# Patient Record
Sex: FEMALE | Race: WHITE | NOT HISPANIC OR LATINO | Employment: OTHER | URBAN - METROPOLITAN AREA
[De-identification: names, ages, dates, MRNs, and addresses within clinical notes are randomized per-mention and may not be internally consistent; named-entity substitution may affect disease eponyms.]

---

## 2017-03-28 DIAGNOSIS — I10 ESSENTIAL HYPERTENSION: Chronic | ICD-10-CM

## 2017-03-28 RX ORDER — AMLODIPINE BESYLATE 5 MG/1
TABLET ORAL
Qty: 30 TABLET | Refills: 0 | Status: SHIPPED | OUTPATIENT
Start: 2017-03-28 | End: 2017-04-28

## 2017-03-28 NOTE — TELEPHONE ENCOUNTER
amLODIPine (NORVASC) 5 MG tablet        Last Written Prescription Date: 1/2/2017  Last Fill Quantity: 90, # refills: 0  Last Office Visit with G, [facility] or [facility] prescribing provider: 12/28/2015  Next 5 appointments (look out 90 days)     Apr 28, 2017  9:30 AM CDT   Office Visit with Melissa Sevilla DO   [facility]    [address] [facility] [address]   [phone]                   Potassium   Date Value Ref Range Status   12/28/2015 3.7 3.4 - 5.3 mmol/L Final     Creatinine   Date Value Ref Range Status   12/28/2015 0.67 0.52 - 1.04 mg/dL Final     BP Readings from Last 3 Encounters:   12/28/15 139/82   08/24/15 142/88   02/18/15 142/81

## 2017-04-28 ENCOUNTER — OFFICE VISIT (OUTPATIENT)
Dept: FAMILY MEDICINE | Facility: CLINIC | Age: 77
End: 2017-04-28
Payer: COMMERCIAL

## 2017-04-28 VITALS
BODY MASS INDEX: 21.99 KG/M2 | TEMPERATURE: 98.3 F | OXYGEN SATURATION: 99 % | HEART RATE: 62 BPM | SYSTOLIC BLOOD PRESSURE: 126 MMHG | HEIGHT: 60 IN | DIASTOLIC BLOOD PRESSURE: 64 MMHG | WEIGHT: 112 LBS

## 2017-04-28 DIAGNOSIS — H61.23 BILATERAL IMPACTED CERUMEN: ICD-10-CM

## 2017-04-28 DIAGNOSIS — Z72.0 TOBACCO ABUSE: Chronic | ICD-10-CM

## 2017-04-28 DIAGNOSIS — Z13.220 LIPID SCREENING: ICD-10-CM

## 2017-04-28 DIAGNOSIS — I10 ESSENTIAL HYPERTENSION: Primary | Chronic | ICD-10-CM

## 2017-04-28 DIAGNOSIS — G56.03 BILATERAL CARPAL TUNNEL SYNDROME: ICD-10-CM

## 2017-04-28 LAB
ERYTHROCYTE [DISTWIDTH] IN BLOOD BY AUTOMATED COUNT: 13.6 % (ref 10–15)
HCT VFR BLD AUTO: 44.9 % (ref 35–47)
HGB BLD-MCNC: 15.2 G/DL (ref 11.7–15.7)
MCH RBC QN AUTO: 32.7 PG (ref 26.5–33)
MCHC RBC AUTO-ENTMCNC: 33.9 G/DL (ref 31.5–36.5)
MCV RBC AUTO: 97 FL (ref 78–100)
PLATELET # BLD AUTO: 231 10E9/L (ref 150–450)
RBC # BLD AUTO: 4.65 10E12/L (ref 3.8–5.2)
WBC # BLD AUTO: 4.8 10E9/L (ref 4–11)

## 2017-04-28 PROCEDURE — 36415 COLL VENOUS BLD VENIPUNCTURE: CPT | Performed by: INTERNAL MEDICINE

## 2017-04-28 PROCEDURE — 85027 COMPLETE CBC AUTOMATED: CPT | Performed by: INTERNAL MEDICINE

## 2017-04-28 PROCEDURE — 80061 LIPID PANEL: CPT | Performed by: INTERNAL MEDICINE

## 2017-04-28 PROCEDURE — 99214 OFFICE O/P EST MOD 30 MIN: CPT | Mod: 25 | Performed by: INTERNAL MEDICINE

## 2017-04-28 PROCEDURE — 69210 REMOVE IMPACTED EAR WAX UNI: CPT | Performed by: INTERNAL MEDICINE

## 2017-04-28 PROCEDURE — 80053 COMPREHEN METABOLIC PANEL: CPT | Performed by: INTERNAL MEDICINE

## 2017-04-28 RX ORDER — AMLODIPINE BESYLATE 5 MG/1
5 TABLET ORAL DAILY
Qty: 90 TABLET | Refills: 3 | Status: SHIPPED | OUTPATIENT
Start: 2017-04-28 | End: 2018-04-23

## 2017-04-28 NOTE — PATIENT INSTRUCTIONS
Labs today   Prescriptions refilled   Ear wash   Get a tetanus shot at a pharmacy   Consider wrist braces if carpal tunnel worsens  Follow up annually or as needed

## 2017-04-28 NOTE — PROGRESS NOTES
SUBJECTIVE:                                                    Corazon Hester is a 76 year old female who presents to clinic today for the following health issues:      Hypertension Follow-up      Outpatient blood pressures are not being checked.    Low Salt Diet: no added salt       Amount of exercise or physical activity: 4-5 days/week for an average of 30-45 minutes    Problems taking medications regularly: No    Medication side effects: none    Diet: low salt    Corazon presents to the clinic today for f/u. She is fasting today.    Home Life- Corazon's  passed away January 2016, they were  53 years. She says she is adjusting well. She had some people come look at her house and this made her cry. She says her children are supportive but don't live nearby. She was in [place] for 4 months over the winter staying with one of her sons. She will likely go back to [place] next winter. She is quite independent.    Bilateral Hand Paresthesia- She notes bilateral paresthesia in her hands when she has been sitting for long periods of time, like watching TV or sleeping. It includes all 5 fingers. She will shake her hands out and the sensation will go away. Sounds like carpal tunnel. Does not involve arms. Not bothersome enough for her to want to do anything about it - just note it.    Of Note-  She notes d/o PVC's, she follows with cardio and not on any medication.  Declines preventative healthcare, including mammogram and DEXA.  She smokes 4 cigarettes a day.  Complains of nasal drainage, especially when flossing or eating.  Asked me to look in her ears.    Problem list and histories reviewed & adjusted, as indicated.      ROS:  Detailed as above    This document serves as a record of the services and decisions personally performed and made by Melissa Sevilla DO. It was created on his/her behalf by Ashley Phelps, a trained medical scribe. The creation of this document is based the provider's  statements to the medical scribe.  Jailene Ashley Beckeraury 9:14 AM, April 28, 2017   OBJECTIVE:                                                    /64 (BP Location: Right arm)  Pulse 62  Temp 98.3  F (36.8  C) (Oral)  Ht 5' (1.524 m)  Wt 112 lb (50.8 kg)  SpO2 99%  BMI 21.87 kg/m2  Body mass index is 21.87 kg/(m^2).  Alert, pleasant, cooperative, NAD and robust for age  HRRR without mrg  Mois oral mucosa, no sores or lesions   Impacted cerumen bilaterally- ear wash per MA  Nasal passages clear    No edema  Lungs clear  Normal affect  No suspicious moles on back   Negative Phalen's test      ASSESSMENT/PLAN:                                                    1. Essential hypertension  Better at recheck and goal for age on her chronic meds  - amLODIPine (NORVASC) 5 MG tablet; Take 1 tablet (5 mg) by mouth daily  Dispense: 90 tablet; Refill: 3  - Comprehensive metabolic panel  - CBC with platelets    2. Tobacco abuse  No interested in cessation at this time     3. Bilateral carpal tunnel syndrome  Advised pt about wrist braces at night to help with paresthesias if she wishes    4. Lipid screening  - Lipid panel reflex to direct LDL    5. Bilateral impacted cerumen  Ear exam showing wax occlusion in the bilateral ear. The patients ear(s) were irrigated using a water pic with moderate amount of wax extracted.  Patient tolerated procedure well.      Patient Instructions   Labs today   Prescriptions refilled   Ear wash   Get a tetanus shot at a pharmacy   Consider wrist braces if carpal tunnel worsens  Follow up annually or as needed    The information in this document, created by the medical scribe for me, accurately reflects the services I personally performed and the decisions made by me. I have reviewed and approved this document for accuracy prior to leaving the patient care area.  Melissa Sevilla DO  9:14 AM, 04/28/17    Melissa Sevilla DO  [facility]

## 2017-04-28 NOTE — NURSING NOTE
Corazon Hester is a 76 year old female who presents today for Ear Wash, with the complaint of wax.    Ear exam showing wax occlusion in the bilateral ear. The patients ear(s) were irrigated using a water pic with moderate amount of wax extracted.  Patient tolerated procedure well.    Ladonna Carrillo CMA

## 2017-04-28 NOTE — NURSING NOTE
Chief Complaint   Patient presents with     RECHECK       Initial /87 (BP Location: Right arm, Cuff Size: Adult Regular)  Pulse 62  Temp 98.3  F (36.8  C) (Oral)  Ht 5' (1.524 m)  Wt 112 lb (50.8 kg)  SpO2 99%  BMI 21.87 kg/m2 Estimated body mass index is 21.87 kg/(m^2) as calculated from the following:    Height as of this encounter: 5' (1.524 m).    Weight as of this encounter: 112 lb (50.8 kg).  Medication Reconciliation: complete.      Ladonna Carrillo ,CMA

## 2017-04-28 NOTE — MR AVS SNAPSHOT
After Visit Summary   4/28/2017    Corazon Hester    MRN: [id number]           Patient Information     Date Of Birth          1940        Visit Information        Provider Department      4/28/2017 9:30 AM Melissa Sevilla,  [facility]        Today's Diagnoses     Essential hypertension    -  1    Tobacco abuse        Bilateral carpal tunnel syndrome        Lipid screening        Bilateral impacted cerumen          Care Instructions    Labs today   Prescriptions refilled   Ear wash   Get a tetanus shot at a pharmacy   Consider wrist braces if carpal tunnel worsens  Follow up annually or as needed          Follow-ups after your visit        Who to contact     If you have questions or need follow up information about today's clinic visit or your schedule please contact [facility] directly at [phone].  Normal or non-critical lab and imaging results will be communicated to you by MyChart, letter or phone within 4 business days after the clinic has received the results. If you do not hear from us within 7 days, please contact the clinic through MyChart or phone. If you have a critical or abnormal lab result, we will notify you by phone as soon as possible.  Submit refill requests through Bl[employer] or call your pharmacy and they will forward the refill request to us. Please allow 3 business days for your refill to be completed.          Additional Information About Your Visit        Care EveryWhere ID     This is your Care EveryWhere ID. This could be used by other organizations to access your [facility] medical records  [id number]        Your Vitals Were     Pulse Temperature Height Pulse Oximetry BMI (Body Mass Index)       62 98.3  F (36.8  C) (Oral) 5' (1.524 m) 99% 21.87 kg/m2        Blood Pressure from Last 3 Encounters:   04/28/17 126/64   12/28/15 139/82   08/24/15 142/88    Weight from Last 3 Encounters:   04/28/17 112 lb (50.8 kg)   12/28/15 113 lb 4.8 oz  (51.4 kg)   08/24/15 112 lb (50.8 kg)              We Performed the Following     CBC with platelets     Comprehensive metabolic panel     Lipid panel reflex to direct LDL     REMOVE IMPACTED CERUMEN          Today's Medication Changes          These changes are accurate as of: 4/28/17 10:21 AM.  If you have any questions, ask your nurse or doctor.               These medicines have changed or have updated prescriptions.        Dose/Directions    amLODIPine 5 MG tablet   Commonly known as:  NORVASC   This may have changed:  See the new instructions.   Used for:  Essential hypertension   Changed by:  Melissa Sevilla DO        Dose:  5 mg   Take 1 tablet (5 mg) by mouth daily   Quantity:  90 tablet   Refills:  3         Stop taking these medicines if you haven't already. Please contact your care team if you have questions.     ADVIL 200 MG tablet   Generic drug:  ibuprofen   Stopped by:  Melissa Sevilla DO                Where to get your medicines      These medications were sent to [place] [address] [place] [facility] [address] [facility]  [address] [facility] [address]     Phone:  [phone]     amLODIPine 5 MG tablet                Primary Care Provider Office Phone # Fax #    Melissa Sevilla [phone]       [facility] [address] [facility] [address]  [facility] [address]        Thank you!     Thank you for choosing [facility]  for your care. Our goal is always to provide you with excellent care. Hearing back from our patients is one way we can continue to improve our services. Please take a few minutes to complete the written survey that you may receive in the mail after your visit with us. Thank you!             Your Updated Medication List - Protect others around you: Learn how to safely use, store and throw away your medicines at www.disposemymeds.org.          This list is accurate as of: 4/28/17 10:21 AM.  Always use your most recent med list.                    Brand Name Dispense Instructions for use    ADVIL -25 MG Caps   Generic drug:  Ibuprofen-Diphenhydramine HCl     0    1 cap at bedtime as needed       amLODIPine 5 MG tablet    NORVASC    90 tablet    Take 1 tablet (5 mg) by mouth daily       ascorbic acid 500 MG tablet    VITAMIN C     one qd       aspirin 325 MG EC tablet      1 TABLET EVERY DAY       BENADRYL 25 MG capsule   Generic drug:  diphenhydrAMINE      ONE TO TWO CAPSULES EVERY 4-6 HOURS AS NEEDED       * CENTRUM SILVER per tablet      1 TABLET DAILY       * multivitamin Tabs tablet      Take 1 tablet by mouth daily.       fish oil-omega-3 fatty acids 1000 MG capsule     0    1 cap daily       METAMUCIL PO      one time daily       * Notice:  This list has 2 medication(s) that are the same as other medications prescribed for you. Read the directions carefully, and ask your doctor or other care provider to review them with you.

## 2017-04-28 NOTE — LETTER
"[place] [address] [facility]  [address]  [place]  [address]  Tel: [phone]    May 1, 2017    Corazon Jacksonncsuma  [address] [address]  [place] [address]        Dear Ms. Hester,    It was a pleasure to see you in clinic recently!  I'm happy to report that your labs are stable.  Your kidney function (GFR) is normal.  Liver testing (AST, ALT, alkaline phosphatase and bilirubin) is normal.  Your fasting glucose is normal; thus there is no evidence of diabetes at this time.  Your complete blood count including white blood cells (infection fighting/inflammatory cells), hemoglobin (oxygen carrying) and platelets (which help blood clot) is normal.  While your total cholesterol is slightly elevated, your \"good\" HDL cholesterol is also nicely elevated, which is protective. Your \"bad\" cholesterol the LDL and triglycerides are satisfactory. Thus I do not recommend a cholesterol medication for you at this time.  Please continue with the plan of care as we discussed and let me know if you have any questions/concerns. Thanks!      Sincerely,    Melissa Sevilla, /bacilio          Enclosure: Lab Results      "

## 2017-04-29 LAB
ALBUMIN SERPL-MCNC: 3.8 G/DL (ref 3.4–5)
ALP SERPL-CCNC: 98 U/L (ref 40–150)
ALT SERPL W P-5'-P-CCNC: 20 U/L (ref 0–50)
ANION GAP SERPL CALCULATED.3IONS-SCNC: 8 MMOL/L (ref 3–14)
AST SERPL W P-5'-P-CCNC: 17 U/L (ref 0–45)
BILIRUB SERPL-MCNC: 1 MG/DL (ref 0.2–1.3)
BUN SERPL-MCNC: 9 MG/DL (ref 7–30)
CALCIUM SERPL-MCNC: 9.4 MG/DL (ref 8.5–10.1)
CHLORIDE SERPL-SCNC: 107 MMOL/L (ref 94–109)
CHOLEST SERPL-MCNC: 263 MG/DL
CO2 SERPL-SCNC: 27 MMOL/L (ref 20–32)
CREAT SERPL-MCNC: 0.74 MG/DL (ref 0.52–1.04)
GFR SERPL CREATININE-BSD FRML MDRD: 76 ML/MIN/1.7M2
GLUCOSE SERPL-MCNC: 95 MG/DL (ref 70–99)
HDLC SERPL-MCNC: 152 MG/DL
LDLC SERPL CALC-MCNC: 104 MG/DL
NONHDLC SERPL-MCNC: 111 MG/DL
POTASSIUM SERPL-SCNC: 4.8 MMOL/L (ref 3.4–5.3)
PROT SERPL-MCNC: 7.1 G/DL (ref 6.8–8.8)
SODIUM SERPL-SCNC: 142 MMOL/L (ref 133–144)
TRIGL SERPL-MCNC: 33 MG/DL

## 2017-05-02 PROBLEM — G56.03 BILATERAL CARPAL TUNNEL SYNDROME: Status: ACTIVE | Noted: 2017-05-02

## 2018-06-21 ENCOUNTER — OFFICE VISIT (OUTPATIENT)
Dept: FAMILY MEDICINE | Facility: CLINIC | Age: 78
End: 2018-06-21
Payer: COMMERCIAL

## 2018-06-21 VITALS
HEIGHT: 60 IN | HEART RATE: 61 BPM | BODY MASS INDEX: 21.6 KG/M2 | TEMPERATURE: 97.3 F | WEIGHT: 110 LBS | DIASTOLIC BLOOD PRESSURE: 85 MMHG | OXYGEN SATURATION: 97 % | RESPIRATION RATE: 12 BRPM | SYSTOLIC BLOOD PRESSURE: 133 MMHG

## 2018-06-21 DIAGNOSIS — Z72.0 TOBACCO ABUSE: Chronic | ICD-10-CM

## 2018-06-21 DIAGNOSIS — I10 ESSENTIAL HYPERTENSION: Primary | Chronic | ICD-10-CM

## 2018-06-21 LAB
ANION GAP SERPL CALCULATED.3IONS-SCNC: 6 MMOL/L (ref 3–14)
BUN SERPL-MCNC: 12 MG/DL (ref 7–30)
CALCIUM SERPL-MCNC: 9.2 MG/DL (ref 8.5–10.1)
CHLORIDE SERPL-SCNC: 107 MMOL/L (ref 94–109)
CO2 SERPL-SCNC: 29 MMOL/L (ref 20–32)
CREAT SERPL-MCNC: 0.73 MG/DL (ref 0.52–1.04)
GFR SERPL CREATININE-BSD FRML MDRD: 77 ML/MIN/1.7M2
GLUCOSE SERPL-MCNC: 89 MG/DL (ref 70–99)
HGB BLD-MCNC: 15 G/DL (ref 11.7–15.7)
POTASSIUM SERPL-SCNC: 4.2 MMOL/L (ref 3.4–5.3)
SODIUM SERPL-SCNC: 142 MMOL/L (ref 133–144)

## 2018-06-21 PROCEDURE — 85018 HEMOGLOBIN: CPT | Performed by: INTERNAL MEDICINE

## 2018-06-21 PROCEDURE — 99213 OFFICE O/P EST LOW 20 MIN: CPT | Performed by: INTERNAL MEDICINE

## 2018-06-21 PROCEDURE — 80048 BASIC METABOLIC PNL TOTAL CA: CPT | Performed by: INTERNAL MEDICINE

## 2018-06-21 PROCEDURE — 36415 COLL VENOUS BLD VENIPUNCTURE: CPT | Performed by: INTERNAL MEDICINE

## 2018-06-21 RX ORDER — AMLODIPINE BESYLATE 5 MG/1
TABLET ORAL
Qty: 90 TABLET | Refills: 3 | Status: SHIPPED | OUTPATIENT
Start: 2018-06-21 | End: 2019-05-15

## 2018-06-21 NOTE — MR AVS SNAPSHOT
"              After Visit Summary   6/21/2018    Corazon Hester    MRN: [id number]           Patient Information     Date Of Birth          1940        Visit Information        Provider Department      6/21/2018 10:30 AM Melissa Sevilla, DO [facility]        Today's Diagnoses     Essential hypertension    -  1      Care Instructions    Keep up the excellent work!  Labs today and I refilled your medication  If your abdominal area gets bigger and/or you develop new abdominal symptoms please let me know right away  Follow up about annually or as needed          Follow-ups after your visit        Who to contact     If you have questions or need follow up information about today's clinic visit or your schedule please contact [facility] directly at [phone].  Normal or non-critical lab and imaging results will be communicated to you by MyChart, letter or phone within 4 business days after the clinic has received the results. If you do not hear from us within 7 days, please contact the clinic through MyChart or phone. If you have a critical or abnormal lab result, we will notify you by phone as soon as possible.  Submit refill requests through [employer] or call your pharmacy and they will forward the refill request to us. Please allow 3 business days for your refill to be completed.          Additional Information About Your Visit        Care EveryWhere ID     This is your Care EveryWhere ID. This could be used by other organizations to access your [facility] medical records  [id number]        Your Vitals Were     Pulse Temperature Respirations Height Pulse Oximetry Breastfeeding?    61 97.3  F (36.3  C) (Oral) 12 4' 11.5\" (1.511 m) 97% No    BMI (Body Mass Index)                   21.85 kg/m2            Blood Pressure from Last 3 Encounters:   06/21/18 133/85   04/28/17 126/64   12/28/15 139/82    Weight from Last 3 Encounters:   06/21/18 110 lb (49.9 kg)   04/28/17 112 lb (50.8 kg) "   12/28/15 113 lb 4.8 oz (51.4 kg)              We Performed the Following     Basic metabolic panel     Hemoglobin          Where to get your medicines      These medications were sent to [place] [address] [facility] [place] - [address]  [address] [place] [address]     Phone:  [phone]     amLODIPine 5 MG tablet          Primary Care Provider Office Phone # Fax #    Melissa Sevilla,  [phone] [address] [facility] [address]  [facility] [address]        Equal Access to Services     [facility]: Hadii aad ku hadasho Soomaali, waaxda luqadaha, qaybta kaalmada adeegyada, efraín coley haygiovanny kuhn . So [facility] [phone].    ATENCIÓN: Si habla español, tiene a odell disposición servicios gratuitos de asistencia lingüística. Llame al [phone].    We comply with applicable federal civil rights laws and [place] laws. We do not discriminate on the basis of race, color, national origin, age, disability, sex, sexual orientation, or gender identity.            Thank you!     Thank you for choosing [facility]  for your care. Our goal is always to provide you with excellent care. Hearing back from our patients is one way we can continue to improve our services. Please take a few minutes to complete the written survey that you may receive in the mail after your visit with us. Thank you!             Your Updated Medication List - Protect others around you: Learn how to safely use, store and throw away your medicines at www.disposemymeds.org.          This list is accurate as of 6/21/18 11:18 AM.  Always use your most recent med list.                   Brand Name Dispense Instructions for use Diagnosis    ADVIL PO       Essential hypertension       amLODIPine 5 MG tablet    NORVASC    90 tablet    TAKE 1 TABLET (5 MG) BY MOUTH DAILY    Essential hypertension       ascorbic acid 500 MG tablet    VITAMIN C     one qd        aspirin 325 MG EC tablet      1 TABLET  EVERY DAY        BENADRYL 25 MG capsule   Generic drug:  diphenhydrAMINE      ONE TO TWO CAPSULES EVERY 4-6 HOURS AS NEEDED        * CENTRUM SILVER per tablet      1 TABLET DAILY        * multivitamin Tabs tablet      Take 1 tablet by mouth daily.        fish oil-omega-3 fatty acids 1000 MG capsule     0    1 cap daily        METAMUCIL PO      one time daily        PROBIOTIC ACIDOPHILUS PO       Essential hypertension       * Notice:  This list has 2 medication(s) that are the same as other medications prescribed for you. Read the directions carefully, and ask your doctor or other care provider to review them with you.

## 2018-06-21 NOTE — PATIENT INSTRUCTIONS
Keep up the excellent work!  Labs today and I refilled your medication  If your abdominal area gets bigger and/or you develop new abdominal symptoms please let me know right away  Follow up about annually or as needed

## 2018-06-21 NOTE — PROGRESS NOTES
"  SUBJECTIVE:   Corazon Hester is a 77 year old female who presents to clinic today for the following health issues:    Overall Corazon has been good the past year and is walking a lot.  [place] is in [place] in a townhouse and has settled-in well. Daughter is here in town but she olivo in [place] with her son.  Down from 4 to 3 cigarettes per day over the past year; good pace with walking and no dyspnea. Rare cough.  In the process of dental implant.  Did shrink about 1.5 inches over the years but declines preventative health care such as DEXA. Lower abdominal bulge after menopause but no symptoms otherwise. No abdominal pain, cramping, signs of blood loss. Bowel and bladder functioning well. Weight is stable.    Hypertension Follow-up      Outpatient blood pressures sometimes only     Low Salt Diet: low salt    Amount of exercise or physical activity: 4-5 days/week for an average of 45-60 minutes    Problems taking medications regularly: No    Medication side effects: none    Diet: low salt    Wt Readings from Last 4 Encounters:   06/21/18 110 lb (49.9 kg)   04/28/17 112 lb (50.8 kg)   12/28/15 113 lb 4.8 oz (51.4 kg)   08/24/15 112 lb (50.8 kg)         Problem list and histories reviewed & adjusted, as indicated.    ROS:  Detailed as above     OBJECTIVE:     /85 (BP Location: Right arm, Patient Position: Sitting, Cuff Size: Adult Regular)  Pulse 61  Temp 97.3  F (36.3  C) (Oral)  Resp 12  Ht 4' 11.5\" (1.511 m)  Wt 110 lb (49.9 kg)  SpO2 97%  Breastfeeding? No  BMI 21.85 kg/m2  Body mass index is 21.85 kg/(m^2).  Alert, cheerful, robust, NAD, great energy  No icterus  HRRR without mrg  Lungs clear without wcr  No edema  Abdomen soft, NT, ND, no HSM, +BS, no bruits, abdominal appearance seems appropriate for age    ASSESSMENT/PLAN:       1. Essential hypertension  At goal and doing well on single agent Amlodipine  - amLODIPine (NORVASC) 5 MG tablet; TAKE " 1 TABLET (5 MG) BY MOUTH DAILY  Dispense: 90 tablet; Refill: 3  - Basic metabolic panel  - Hemoglobin    2. Tobacco abuse  Slowly cutting back    3. Lower abdominal bulge  Seems normal with age, postmenopausal status, shorter height  Asymptomatic, weight basically stable  Robust lady  She will monitor    Patient Instructions   Keep up the excellent work!  Labs today and I refilled your medication  If your abdominal area gets bigger and/or you develop new abdominal symptoms please let me know right away  Follow up about annually or as needed      Melissa Sevilla, DO  [facility]

## 2018-06-21 NOTE — LETTER
[facility]  [address] [facility]  [address]  [place]  [address]  Tel: [phone]    June 21, 2018    Corazon Hester  [address] [facility] [address]        Corazon,     I'm happy to report that your labs are all excellent! Please continue with the plan of care as we discussed and let me know if you have any questions/concerns. Thanks!       Sincerely,    Melissa Sevilla, DO/cw        Results for orders placed or performed in visit on 06/21/18   Basic metabolic panel   Result Value Ref Range    Sodium 142 133 - 144 mmol/L    Potassium 4.2 3.4 - 5.3 mmol/L    Chloride 107 94 - 109 mmol/L    Carbon Dioxide 29 20 - 32 mmol/L    Anion Gap 6 3 - 14 mmol/L    Glucose 89 70 - 99 mg/dL    Urea Nitrogen 12 7 - 30 mg/dL    Creatinine 0.73 0.52 - 1.04 mg/dL    GFR Estimate 77 >60 mL/min/1.7m2    GFR Estimate If Black >90 >60 mL/min/1.7m2    Calcium 9.2 8.5 - 10.1 mg/dL   Hemoglobin   Result Value Ref Range    Hemoglobin 15.0 11.7 - 15.7 g/dL

## 2019-05-15 DIAGNOSIS — I10 ESSENTIAL HYPERTENSION: Chronic | ICD-10-CM

## 2019-05-15 RX ORDER — AMLODIPINE BESYLATE 5 MG/1
TABLET ORAL
Qty: 30 TABLET | Refills: 1 | Status: SHIPPED | OUTPATIENT
Start: 2019-05-15 | End: 2019-08-04

## 2019-05-15 NOTE — TELEPHONE ENCOUNTER
Prescription approved per [facility] Refill Protocol.  Has future appt with new PCP  Delia NEVAREZ RN

## 2019-05-15 NOTE — TELEPHONE ENCOUNTER
"Pending Prescriptions:                       Disp   Refills    amLODIPine (NORVASC) 5 MG tablet [Pharmac*90 tab*0            Sig: TAKE 1 TABLET BY MOUTH EVERY DAY    Last Written Prescription Date:  5/21/18  Last Fill Quantity: 90,  # refills: 3   Last office visit: 6/21/2018 with prescribing provider:     Future Office Visit:   Next 5 appointments (look out 90 days)    Aug 01, 2019 10:50 AM CDT  Office Visit with Lenny Guillermo MD  [facility] [address] [place] [address]  [address]  [facility] [address] [phone]         Requested Prescriptions   Pending Prescriptions Disp Refills     amLODIPine (NORVASC) 5 MG tablet [Pharmacy Med Name: AMLODIPINE BESYLATE 5 MG TAB] 90 tablet 0     Sig: TAKE 1 TABLET BY MOUTH EVERY DAY       Calcium Channel Blockers Protocol  Passed - 5/15/2019 12:43 PM        Passed - Blood pressure under 140/90 in past 12 months     BP Readings from Last 3 Encounters:   06/21/18 133/85   04/28/17 126/64   12/28/15 139/82                 Passed - Recent (12 mo) or future (30 days) visit within the authorizing provider's specialty     Patient had office visit in the last 12 months or has a visit in the next 30 days with authorizing provider or within the authorizing provider's specialty.  See \"Patient Info\" tab in inbasket, or \"Choose Columns\" in Meds & Orders section of the refill encounter.              Passed - Medication is active on med list        Passed - Patient is age 18 or older        Passed - No active pregnancy on record        Passed - Normal serum creatinine on file in past 12 months     Recent Labs   Lab Test 06/21/18  1127   CR 0.73             Passed - No positive pregnancy test in past 12 months          "

## 2019-08-12 ENCOUNTER — OFFICE VISIT (OUTPATIENT)
Dept: PEDIATRICS | Facility: CLINIC | Age: 79
End: 2019-08-12
Payer: MEDICARE

## 2019-08-12 VITALS
TEMPERATURE: 97.9 F | SYSTOLIC BLOOD PRESSURE: 132 MMHG | WEIGHT: 110 LBS | BODY MASS INDEX: 21.6 KG/M2 | OXYGEN SATURATION: 97 % | DIASTOLIC BLOOD PRESSURE: 76 MMHG | HEART RATE: 73 BPM | HEIGHT: 60 IN

## 2019-08-12 DIAGNOSIS — Z72.0 TOBACCO ABUSE: Chronic | ICD-10-CM

## 2019-08-12 DIAGNOSIS — I10 ESSENTIAL HYPERTENSION: Chronic | ICD-10-CM

## 2019-08-12 DIAGNOSIS — Z00.00 ENCOUNTER FOR MEDICAL EXAMINATION TO ESTABLISH CARE: Primary | ICD-10-CM

## 2019-08-12 LAB
ERYTHROCYTE [DISTWIDTH] IN BLOOD BY AUTOMATED COUNT: 13 % (ref 10–15)
HCT VFR BLD AUTO: 46.9 % (ref 35–47)
HGB BLD-MCNC: 15.6 G/DL (ref 11.7–15.7)
MCH RBC QN AUTO: 32.2 PG (ref 26.5–33)
MCHC RBC AUTO-ENTMCNC: 33.3 G/DL (ref 31.5–36.5)
MCV RBC AUTO: 97 FL (ref 78–100)
PLATELET # BLD AUTO: 187 10E9/L (ref 150–450)
RBC # BLD AUTO: 4.85 10E12/L (ref 3.8–5.2)
WBC # BLD AUTO: 6.3 10E9/L (ref 4–11)

## 2019-08-12 PROCEDURE — 80061 LIPID PANEL: CPT | Performed by: INTERNAL MEDICINE

## 2019-08-12 PROCEDURE — 36415 COLL VENOUS BLD VENIPUNCTURE: CPT | Performed by: INTERNAL MEDICINE

## 2019-08-12 PROCEDURE — 85027 COMPLETE CBC AUTOMATED: CPT | Performed by: INTERNAL MEDICINE

## 2019-08-12 PROCEDURE — 80053 COMPREHEN METABOLIC PANEL: CPT | Performed by: INTERNAL MEDICINE

## 2019-08-12 PROCEDURE — 99213 OFFICE O/P EST LOW 20 MIN: CPT | Performed by: INTERNAL MEDICINE

## 2019-08-12 RX ORDER — AMLODIPINE BESYLATE 5 MG/1
5 TABLET ORAL DAILY
Qty: 90 TABLET | Refills: 3 | Status: SHIPPED | OUTPATIENT
Start: 2019-08-12 | End: 2022-05-06

## 2019-08-12 ASSESSMENT — MIFFLIN-ST. JEOR: SCORE: 894.84

## 2019-08-12 NOTE — LETTER
"               [facility]  [address] [address]                  [phone]   August 13, 2019    Corazon Hester  [address] [facility] [place] [address]      Dear Corazon,    Here is a summary of your recent test results:  Here are your lab results.  Your LDL (aka \"bad\" cholesterol) is slightly elevated, but your HDL (aka \"good\" cholesterol) is high, which helps offset the bad cholesterol.     As you may know, an elevated cholesterol (specifically LDL) is one factor that increases your risk for heart disease and stroke. You can improve your cholesterol by controlling the amount and type of fat you eat and by increasing your daily activity level.     Here are some ways to improve your nutrition:   - Eat less fat (especially butter, Crisco and other saturated fats)   - Buy lean cuts of meat, reduce your portions of red meat or substitute poultry or fish   - Eat no more than 4 egg yolks per week   - Avoid fried or fast foods that are high in fat   - Eat more fruits and vegetables   - Reduce the percent of calories from saturated fat and trans fat (to less than 5-10% of total calories consumed)   - Limits intake of sweets, sugar-sweetened beverages, and red meats   - Increase intake of leafy green vegetables, fruits, and whole grains.   - A healthy balanced diet can include low-fat dairy products, poultry, fish, legumes, nontropical vegetable oils, and nuts     Also consider starting or increasing your aerobic activity. Aerobic activity is the best way to improve HDL (good) cholesterol.   - 3 or 4 physical activity sessions/week   - Average duration 40 minutes/session   - Activity should be moderate-to-vigorous intensity (I recommend a mixture of cardiovascular and strength training).         The remainder of your lab results are normal.  Please feel free to call with any questions.    Your test results are enclosed.             Thank you very much for choosing " [facility],    Mukesh Cabezas MD        Results for orders placed or performed in visit on 08/12/19   Lipid panel reflex to direct LDL Fasting   Result Value Ref Range    Cholesterol 273 (H) <200 mg/dL    Triglycerides 57 <150 mg/dL    HDL Cholesterol 148 >49 mg/dL    LDL Cholesterol Calculated 114 (H) <100 mg/dL    Non HDL Cholesterol 125 <130 mg/dL   Comprehensive metabolic panel (BMP + Alb, Alk Phos, ALT, AST, Total. Bili, TP)   Result Value Ref Range    Sodium 142 133 - 144 mmol/L    Potassium 4.5 3.4 - 5.3 mmol/L    Chloride 107 94 - 109 mmol/L    Carbon Dioxide 26 20 - 32 mmol/L    Anion Gap 9 3 - 14 mmol/L    Glucose 94 70 - 99 mg/dL    Urea Nitrogen 11 7 - 30 mg/dL    Creatinine 0.70 0.52 - 1.04 mg/dL    GFR Estimate 82 >60 mL/min/[1.73_m2]    GFR Estimate If Black >90 >60 mL/min/[1.73_m2]    Calcium 9.0 8.5 - 10.1 mg/dL    Bilirubin Total 1.1 0.2 - 1.3 mg/dL    Albumin 3.9 3.4 - 5.0 g/dL    Protein Total 7.4 6.8 - 8.8 g/dL    Alkaline Phosphatase 110 40 - 150 U/L    ALT 20 0 - 50 U/L    AST 18 0 - 45 U/L   CBC with platelets   Result Value Ref Range    WBC 6.3 4.0 - 11.0 10e9/L    RBC Count 4.85 3.8 - 5.2 10e12/L    Hemoglobin 15.6 11.7 - 15.7 g/dL    Hematocrit 46.9 35.0 - 47.0 %    MCV 97 78 - 100 fl    MCH 32.2 26.5 - 33.0 pg    MCHC 33.3 31.5 - 36.5 g/dL    RDW 13.0 10.0 - 15.0 %    Platelet Count 187 150 - 450 10e9/L

## 2019-08-12 NOTE — PATIENT INSTRUCTIONS
Welcome to the [facility]!  It was a pleasure meeting you today.    During today's visit, I reviewed your history, renewed your blood pressure meds, and will check some labs.  -- I recommend stopping daily aspirin or at least lowering your dose to 81 mg daily  -- I recommend screening for colon cancer - please let me know if you change your mind about this.  There is a stool test you can do instead of colonoscopy.  -- Get your tetanus shot downstairs at the pharmacy (or pharmacy of your choice)  -- Otherwise, for blood pressure medication renewals, I just need to see you yearly.    Do not hesitate to contact the clinic via [employer] or phone [phone] with questions about your health.    In addition to clinic appointments, we offer phone and E-visit encounters when deemed appropriate.

## 2019-08-12 NOTE — PROGRESS NOTES
New Patient/Transfer of Care    Previous PCP or place of care: [facility] Melissa Sevilla MD  Up to date on yearly wellness exam? Doesn't do wellness exams   Reason for transfer of care (if known): Moved to [place] in 2017, yet last PCP is only doing memory clinic.      Health Maintenance:    Colonoscopy  Done? No.  Lipid Done? Yes, two years ago , on this date: 04/28/2017, by this group: [facility] , results were normal   Hep C done? No     Mammogram  Done? Yes in the past    Tdap  Done in last 10 years? No , on this date:     Subjective     Corazon Hester is a 78 year old female who presents to clinic today for the following health issues:       New Patient/Transfer of Care  Hypertension Follow-up      Do you check your blood pressure regularly outside of the clinic? No     Are you following a low salt diet? No    Are your blood pressures ever more than 140 on the top number (systolic) OR more   than 90 on the bottom number (diastolic), for example 140/90? No         Answers for HPI/ROS submitted by the patient on 8/12/2019   Chronic problems general questions HPI Form  How many servings of fruits and vegetables do you eat daily?: 4 or more  On average, how many sweetened beverages do you drink each day (soda, juice, sweet tea, etc)?: 1  How many days per week do you miss taking your medication?: 0  Do you check your blood pressure regularly outside of the clinic?: No  Are your blood pressures ever more than 140 on the top number (systolic) OR more than 90 on the bottom number (diastolic)? (For example, greater than 140/90): No  Are you following a low salt diet?: Yes        Patient Active Problem List   Diagnosis     Hypertension; goal < 150/90     Advanced directives, counseling/discussion     Tobacco abuse     Bilateral carpal tunnel syndrome     Past Surgical History:   Procedure Laterality Date     HC DILATION/CURETTAGE DIAG/THER NON OB      x3-4     HC REMOVAL OF TONSILS,<11 Y/O       STRIP VEIN   9-11    left leg       Social History     Tobacco Use     Smoking status: Current Every Day Smoker     Packs/day: 0.50     Years: 50.00     Pack years: 25.00     Smokeless tobacco: Never Used     Tobacco comment: 4-5 cigarettes per day   Substance Use Topics     Alcohol use: Yes     Alcohol/week: 0.0 oz     Comment: 7 drinks per week     Family History   Problem Relation Age of Onset     Cancer Father         Bladder     Hypertension Sister          Current Outpatient Medications   Medication Sig Dispense Refill     amLODIPine (NORVASC) 5 MG tablet Take 1 tablet (5 mg) by mouth daily 90 tablet 3     ASPIRIN 325 MG OR TBEC 1 TABLET EVERY DAY       BENADRYL 25 MG OR CAPS ONE TO TWO CAPSULES EVERY 4-6 HOURS AS NEEDED  0     CENTRUM SILVER OR TABS 1 TABLET DAILY       FISH OIL 1000 MG OR CAPS 1 cap daily  0 0     Ibuprofen (ADVIL PO)        Lactobacillus (PROBIOTIC ACIDOPHILUS PO)        METAMUCIL OR one time daily       multivitamin (OCUVITE) TABS Take 1 tablet by mouth daily.       VITAMIN C 500 MG OR TABS one qd       Allergies   Allergen Reactions     Calcium GI Disturbance     Recent Labs   Lab Test 06/21/18  1127 04/28/17  1059  04/16/14  0944  06/04/12  1122   LDL  --  104*  --  105  --  118   HDL  --  152  --  129  --  140*   TRIG  --  33  --  42  --  43   ALT  --  20  --   --   --   --    CR 0.73 0.74   < > 0.79   < >  --    GFRESTIMATED 77 76   < > 71   < >  --    GFRESTBLACK >90 >90  African American GFR Calc     < > 86   < >  --    POTASSIUM 4.2 4.8   < > 4.7   < >  --     < > = values in this interval not displayed.      BP Readings from Last 3 Encounters:   08/12/19 132/76   06/21/18 133/85   04/28/17 126/64    Wt Readings from Last 3 Encounters:   08/12/19 49.9 kg (110 lb)   06/21/18 49.9 kg (110 lb)   04/28/17 50.8 kg (112 lb)                    Reviewed and updated as needed this visit by Provider         Review of Systems   ROS COMP: Constitutional, HEENT, cardiovascular, pulmonary, gi and gu systems  "are negative, except as otherwise noted.      Objective    /76 (BP Location: Left arm, Cuff Size: Adult Regular)   Pulse 73   Temp 97.9  F (36.6  C) (Oral)   Ht 1.515 m (4' 11.65\")   Wt 49.9 kg (110 lb)   SpO2 97%   BMI 21.74 kg/m    Body mass index is 21.74 kg/m .  Physical Exam   GENERAL: healthy, alert and no distress  EYES: Eyes grossly normal to inspection, PERRL and conjunctivae and sclerae normal  HENT: ear canals and TM's normal, nose and mouth without ulcers or lesions  NECK: no adenopathy, no asymmetry, masses, or scars and thyroid normal to palpation  RESP: lungs clear to auscultation - no rales, rhonchi or wheezes  BREAST: normal without masses, tenderness or nipple discharge and no palpable axillary masses or adenopathy  CV: regular rate and rhythm, normal S1 S2, no S3 or S4, no murmur, click or rub, no peripheral edema and peripheral pulses strong  ABDOMEN: soft, nontender, no hepatosplenomegaly, no masses and bowel sounds normal  MS: no gross musculoskeletal defects noted, no edema  SKIN: no suspicious lesions or rashes  NEURO: Normal strength and tone, mentation intact and speech normal  PSYCH: mentation appears normal, affect normal/bright    Diagnostic Test Results:  Labs reviewed in Epic        Assessment & Plan     Problem List Items Addressed This Visit        Circulatory    Hypertension; goal < 150/90 (Chronic)     Continue current regimen.         Relevant Medications    amLODIPine (NORVASC) 5 MG tablet    Other Relevant Orders    Lipid panel reflex to direct LDL Fasting (Completed)    Comprehensive metabolic panel (BMP + Alb, Alk Phos, ALT, AST, Total. Bili, TP) (Completed)       Behavioral    Tobacco abuse (Chronic)     Readiness to quit is 3/10.  Reassess readiness and advise each visit.         Relevant Orders    CBC with platelets (Completed)      Other Visit Diagnoses     Encounter for medical examination to establish care    -  Primary        Pt not interested in preventive " care at this time.  Recommend decreasing aspirin to 81 mg or stopping altogether - pt to think about it.     Tobacco Cessation:   reports that she has been smoking.  She has never used smokeless tobacco.  Tobacco Cessation Action Plan: Information offered: Patient not interested at this time        Patient Instructions   Welcome to the [facility]!  It was a pleasure meeting you today.    During today's visit, I reviewed your history, renewed your blood pressure meds, and will check some labs.  -- I recommend stopping daily aspirin or at least lowering your dose to 81 mg daily  -- I recommend screening for colon cancer - please let me know if you change your mind about this.  There is a stool test you can do instead of colonoscopy.  -- Otherwise, for blood pressure medication renewals, I just need to see you yearly.    Do not hesitate to contact the clinic via [employer] or phone [phone] with questions about your health.    In addition to clinic appointments, we offer phone and E-visit encounters when deemed appropriate.          Return in about 1 year (around 8/12/2020) for Physical Exam.    Greater than 50% of the 30 minute visit was spent in counseling and coordination of care regarding patient conditions above.      Mukesh Cabezas MD  [facility]

## 2019-08-13 LAB
ALBUMIN SERPL-MCNC: 3.9 G/DL (ref 3.4–5)
ALP SERPL-CCNC: 110 U/L (ref 40–150)
ALT SERPL W P-5'-P-CCNC: 20 U/L (ref 0–50)
ANION GAP SERPL CALCULATED.3IONS-SCNC: 9 MMOL/L (ref 3–14)
AST SERPL W P-5'-P-CCNC: 18 U/L (ref 0–45)
BILIRUB SERPL-MCNC: 1.1 MG/DL (ref 0.2–1.3)
BUN SERPL-MCNC: 11 MG/DL (ref 7–30)
CALCIUM SERPL-MCNC: 9 MG/DL (ref 8.5–10.1)
CHLORIDE SERPL-SCNC: 107 MMOL/L (ref 94–109)
CHOLEST SERPL-MCNC: 273 MG/DL
CO2 SERPL-SCNC: 26 MMOL/L (ref 20–32)
CREAT SERPL-MCNC: 0.7 MG/DL (ref 0.52–1.04)
GFR SERPL CREATININE-BSD FRML MDRD: 82 ML/MIN/{1.73_M2}
GLUCOSE SERPL-MCNC: 94 MG/DL (ref 70–99)
HDLC SERPL-MCNC: 148 MG/DL
LDLC SERPL CALC-MCNC: 114 MG/DL
NONHDLC SERPL-MCNC: 125 MG/DL
POTASSIUM SERPL-SCNC: 4.5 MMOL/L (ref 3.4–5.3)
PROT SERPL-MCNC: 7.4 G/DL (ref 6.8–8.8)
SODIUM SERPL-SCNC: 142 MMOL/L (ref 133–144)
TRIGL SERPL-MCNC: 57 MG/DL

## 2019-10-09 ENCOUNTER — OFFICE VISIT - HEALTHEAST (OUTPATIENT)
Dept: FAMILY MEDICINE | Facility: CLINIC | Age: 79
End: 2019-10-09

## 2019-10-09 DIAGNOSIS — I10 HYPERTENSION, UNSPECIFIED TYPE: ICD-10-CM

## 2019-10-09 DIAGNOSIS — F17.200 SMOKING: ICD-10-CM

## 2019-10-09 DIAGNOSIS — H61.20 IMPACTED CERUMEN, UNSPECIFIED LATERALITY: ICD-10-CM

## 2019-10-09 ASSESSMENT — MIFFLIN-ST. JEOR: SCORE: 904.06

## 2019-10-11 ENCOUNTER — OFFICE VISIT - HEALTHEAST (OUTPATIENT)
Dept: FAMILY MEDICINE | Facility: CLINIC | Age: 79
End: 2019-10-11

## 2019-10-11 DIAGNOSIS — H92.01 RIGHT EAR PAIN: ICD-10-CM

## 2019-10-11 DIAGNOSIS — R21 RASH: ICD-10-CM

## 2019-10-11 ASSESSMENT — MIFFLIN-ST. JEOR: SCORE: 909.45

## 2019-10-14 ENCOUNTER — OFFICE VISIT - HEALTHEAST (OUTPATIENT)
Dept: FAMILY MEDICINE | Facility: CLINIC | Age: 79
End: 2019-10-14

## 2019-10-14 DIAGNOSIS — L03.211 CELLULITIS OF FACE: ICD-10-CM

## 2019-10-17 ENCOUNTER — OFFICE VISIT - HEALTHEAST (OUTPATIENT)
Dept: FAMILY MEDICINE | Facility: CLINIC | Age: 79
End: 2019-10-17

## 2019-10-17 DIAGNOSIS — H93.11 TINNITUS OF RIGHT EAR: ICD-10-CM

## 2019-10-17 DIAGNOSIS — T14.8XXA ABRASION: ICD-10-CM

## 2019-10-24 ENCOUNTER — OFFICE VISIT - HEALTHEAST (OUTPATIENT)
Dept: AUDIOLOGY | Facility: CLINIC | Age: 79
End: 2019-10-24

## 2019-10-24 ENCOUNTER — OFFICE VISIT - HEALTHEAST (OUTPATIENT)
Dept: FAMILY MEDICINE | Facility: CLINIC | Age: 79
End: 2019-10-24

## 2019-10-24 DIAGNOSIS — H61.22 IMPACTED CERUMEN OF LEFT EAR: ICD-10-CM

## 2019-10-24 DIAGNOSIS — H90.8 MIXED CONDUCTIVE AND SENSORINEURAL HEARING LOSS, UNSPECIFIED LATERALITY: ICD-10-CM

## 2019-10-24 DIAGNOSIS — H93.11 TINNITUS OF RIGHT EAR: ICD-10-CM

## 2019-10-31 ENCOUNTER — OFFICE VISIT - HEALTHEAST (OUTPATIENT)
Dept: AUDIOLOGY | Facility: CLINIC | Age: 79
End: 2019-10-31

## 2019-10-31 DIAGNOSIS — H90.A21 SENSORINEURAL HEARING LOSS (SNHL) OF RIGHT EAR WITH RESTRICTED HEARING OF LEFT EAR: ICD-10-CM

## 2019-10-31 DIAGNOSIS — H93.11 TINNITUS OF RIGHT EAR: ICD-10-CM

## 2019-11-19 ENCOUNTER — OFFICE VISIT - HEALTHEAST (OUTPATIENT)
Dept: OTOLARYNGOLOGY | Facility: CLINIC | Age: 79
End: 2019-11-19

## 2019-11-19 DIAGNOSIS — H90.3 ASNHL (ASYMMETRICAL SENSORINEURAL HEARING LOSS): ICD-10-CM

## 2019-11-19 DIAGNOSIS — H93.13 TINNITUS, BILATERAL: ICD-10-CM

## 2019-11-20 ENCOUNTER — COMMUNICATION - HEALTHEAST (OUTPATIENT)
Dept: OTOLARYNGOLOGY | Facility: CLINIC | Age: 79
End: 2019-11-20

## 2019-11-24 ENCOUNTER — HOSPITAL ENCOUNTER (OUTPATIENT)
Dept: MRI IMAGING | Facility: CLINIC | Age: 79
Discharge: HOME | End: 2019-11-24
Attending: OTOLARYNGOLOGY

## 2019-11-24 DIAGNOSIS — H90.3 ASNHL (ASYMMETRICAL SENSORINEURAL HEARING LOSS): ICD-10-CM

## 2019-11-24 LAB
CREAT BLD-MCNC: 0.7 MG/DL (ref 0.6–1.1)
GFR SERPL CREATININE-BSD FRML MDRD: >60 ML/MIN/1.73M2

## 2019-12-06 ENCOUNTER — COMMUNICATION - HEALTHEAST (OUTPATIENT)
Dept: SCHEDULING | Facility: CLINIC | Age: 79
End: 2019-12-06

## 2019-12-06 ENCOUNTER — COMMUNICATION - HEALTHEAST (OUTPATIENT)
Dept: OTOLARYNGOLOGY | Facility: CLINIC | Age: 79
End: 2019-12-06

## 2020-06-08 ENCOUNTER — COMMUNICATION - HEALTHEAST (OUTPATIENT)
Dept: FAMILY MEDICINE | Facility: CLINIC | Age: 80
End: 2020-06-08

## 2020-07-06 ENCOUNTER — OFFICE VISIT - HEALTHEAST (OUTPATIENT)
Dept: FAMILY MEDICINE | Facility: CLINIC | Age: 80
End: 2020-07-06

## 2020-07-06 DIAGNOSIS — F17.200 SMOKING: ICD-10-CM

## 2020-07-06 DIAGNOSIS — I10 HYPERTENSION, UNSPECIFIED TYPE: ICD-10-CM

## 2020-07-06 LAB
ANION GAP SERPL CALCULATED.3IONS-SCNC: 8 MMOL/L (ref 5–18)
BUN SERPL-MCNC: 8 MG/DL (ref 8–28)
CALCIUM SERPL-MCNC: 9.7 MG/DL (ref 8.5–10.5)
CHLORIDE BLD-SCNC: 105 MMOL/L (ref 98–107)
CO2 SERPL-SCNC: 27 MMOL/L (ref 22–31)
CREAT SERPL-MCNC: 0.77 MG/DL (ref 0.6–1.1)
ERYTHROCYTE [DISTWIDTH] IN BLOOD BY AUTOMATED COUNT: 11.6 % (ref 11–14.5)
GFR SERPL CREATININE-BSD FRML MDRD: >60 ML/MIN/1.73M2
GLUCOSE BLD-MCNC: 94 MG/DL (ref 70–125)
HCT VFR BLD AUTO: 45.3 % (ref 35–47)
HGB BLD-MCNC: 15.7 G/DL (ref 12–16)
MCH RBC QN AUTO: 32.8 PG (ref 27–34)
MCHC RBC AUTO-ENTMCNC: 34.7 G/DL (ref 32–36)
MCV RBC AUTO: 95 FL (ref 80–100)
PLATELET # BLD AUTO: 215 THOU/UL (ref 140–440)
PMV BLD AUTO: 7 FL (ref 7–10)
POTASSIUM BLD-SCNC: 4.5 MMOL/L (ref 3.5–5)
RBC # BLD AUTO: 4.79 MILL/UL (ref 3.8–5.4)
SODIUM SERPL-SCNC: 140 MMOL/L (ref 136–145)
WBC: 5.4 THOU/UL (ref 4–11)

## 2020-07-06 ASSESSMENT — MIFFLIN-ST. JEOR: SCORE: 894.99

## 2020-07-07 ENCOUNTER — COMMUNICATION - HEALTHEAST (OUTPATIENT)
Dept: FAMILY MEDICINE | Facility: CLINIC | Age: 80
End: 2020-07-07

## 2020-10-27 ENCOUNTER — OFFICE VISIT - HEALTHEAST (OUTPATIENT)
Dept: FAMILY MEDICINE | Facility: CLINIC | Age: 80
End: 2020-10-27

## 2020-10-27 DIAGNOSIS — R09.82 PND (POST-NASAL DRIP): ICD-10-CM

## 2021-02-02 ENCOUNTER — TRANSFERRED RECORDS (OUTPATIENT)
Dept: HEALTH INFORMATION MANAGEMENT | Facility: CLINIC | Age: 81
End: 2021-02-02

## 2021-04-19 ENCOUNTER — RECORDS - HEALTHEAST (OUTPATIENT)
Dept: ADMINISTRATIVE | Facility: OTHER | Age: 81
End: 2021-04-19

## 2021-05-10 ENCOUNTER — OFFICE VISIT - HEALTHEAST (OUTPATIENT)
Dept: FAMILY MEDICINE | Facility: CLINIC | Age: 81
End: 2021-05-10

## 2021-05-10 DIAGNOSIS — Z86.72 PERSONAL HISTORY OF THROMBOPHLEBITIS: ICD-10-CM

## 2021-05-10 DIAGNOSIS — I10 HYPERTENSION, UNSPECIFIED TYPE: ICD-10-CM

## 2021-05-10 LAB
ANION GAP SERPL CALCULATED.3IONS-SCNC: 9 MMOL/L (ref 5–18)
BUN SERPL-MCNC: 10 MG/DL (ref 8–28)
CALCIUM SERPL-MCNC: 9.5 MG/DL (ref 8.5–10.5)
CHLORIDE BLD-SCNC: 104 MMOL/L (ref 98–107)
CHOLEST SERPL-MCNC: 267 MG/DL
CO2 SERPL-SCNC: 28 MMOL/L (ref 22–31)
CREAT SERPL-MCNC: 0.76 MG/DL (ref 0.6–1.1)
ERYTHROCYTE [DISTWIDTH] IN BLOOD BY AUTOMATED COUNT: 12.6 % (ref 11–14.5)
FASTING STATUS PATIENT QL REPORTED: YES
GFR SERPL CREATININE-BSD FRML MDRD: >60 ML/MIN/1.73M2
GLUCOSE BLD-MCNC: 92 MG/DL (ref 70–125)
HCT VFR BLD AUTO: 46.8 % (ref 35–47)
HDLC SERPL-MCNC: 128 MG/DL
HGB BLD-MCNC: 15.6 G/DL (ref 12–16)
LDLC SERPL CALC-MCNC: 128 MG/DL
MCH RBC QN AUTO: 32.1 PG (ref 27–34)
MCHC RBC AUTO-ENTMCNC: 33.3 G/DL (ref 32–36)
MCV RBC AUTO: 96 FL (ref 80–100)
PLATELET # BLD AUTO: 215 THOU/UL (ref 140–440)
PMV BLD AUTO: 8.7 FL (ref 7–10)
POTASSIUM BLD-SCNC: 4.3 MMOL/L (ref 3.5–5)
RBC # BLD AUTO: 4.86 MILL/UL (ref 3.8–5.4)
SODIUM SERPL-SCNC: 141 MMOL/L (ref 136–145)
TRIGL SERPL-MCNC: 56 MG/DL
WBC: 5.3 THOU/UL (ref 4–11)

## 2021-05-10 ASSESSMENT — MIFFLIN-ST. JEOR: SCORE: 894.99

## 2021-05-11 ENCOUNTER — COMMUNICATION - HEALTHEAST (OUTPATIENT)
Dept: FAMILY MEDICINE | Facility: CLINIC | Age: 81
End: 2021-05-11

## 2021-05-27 VITALS
HEART RATE: 75 BPM | SYSTOLIC BLOOD PRESSURE: 120 MMHG | DIASTOLIC BLOOD PRESSURE: 84 MMHG | WEIGHT: 111 LBS | BODY MASS INDEX: 21.79 KG/M2 | OXYGEN SATURATION: 98 % | HEIGHT: 60 IN

## 2021-06-02 NOTE — PROGRESS NOTES
"Chief Complaint   Patient presents with     Ear Problem       HPI: 78-year-old female with past medical history of lesion on her right face that it has been infected, as well as hypertension presents today with hearing loss.  She notes she continues to have hearing loss was seen by audiology who was not able to assess her because of cerumen impaction.  She notes ear fullness sensations.    ROS: No headache.  No tinnitus although occasionally she says she has a mild \"home\".    SH:    reports that she has been smoking. She has been smoking about 0.00 packs per day. She has never used smokeless tobacco.      FH: The Patient's family history is not on file.     Meds:  Corazon has a current medication list which includes the following prescription(s): amlodipine, ascorbic acid (vitamin c), aspirin, cephalexin, diphenhydramine, ibuprofen, lactobacillus acidophilus, multivit-min-fa-lycopen-lutein, omega-3 acid ethyl esters, psyllium husk, and vit c/e/zn/coppr/lutein/zeaxan.    O:  There were no vitals taken for this visit.  Alert conversant no acute distress  Skin pink and dry  Examination of the ears show cerumen impaction which was removed by irrigation.  After cerumen removed both TMs were clear and normal    A/P:   1. Mixed conductive and sensorineural hearing loss, unspecified laterality  Patient has hearing loss and will refer to audiology    2. Impacted cerumen of left ear  Cerumen impaction removed with irrigation.                                          "

## 2021-06-02 NOTE — PROGRESS NOTES
Audiology only; referred by Brandt Osullivan    History:  Right-sided humming tinnitus began within past two weeks, which is intermittent and nonpulsatile, sometimes louder and sometimes quieter. Cerumen was recently removed from the right ear by primary care. She denied hearing loss in either ear, dizziness, current otalgia, aural fullness, recent illness, or history of noise exposure.     Results:  Otoscopy was unremarkable in the right ear. but revealed a near complete cerumen occlusion in the left ear. Hearing testing was deferred until cerumen can be safely removed by a physician.      Recommendations:  Follow-up with ENT/PCP for cerumen management; hearing testing may be rescheduled, if desired, once ear canals are both clear. Further recommendations may be given at that time. Ms. Hester declined assistance in scheduling with ENT today, indicating she'd prefer to return to primary care. Ms. Hester expressed verbal understanding of this information and plan. No charge appointment today.    Isabel Manrique, [facility]  [place] Licensed Audiologist [id number]

## 2021-06-02 NOTE — PROGRESS NOTES
Chief Complaint   Patient presents with     Establish Care     Tinnitus     pt having echo ringing in the right ear, on and off, last nigt more of a humming        HPI: 78-year-old female, new to our community, presents today for establishment of care.  She also has complaint of hearing loss in her right ear as well as tinnitus.  She notes that she cleans her right ear with a Q-tip and force hydrogen peroxide in that ear but it has not been effective recently.  There is no difficulty with the left ear.  Old records from 6/21/2018 reviewed showing the patient had hypertension, tobacco abuse as well as an abdominal bulge.    She has a history of hypertension and has been on amlodipine 5 mg daily and is been well controlled.    ROS: 10 point system review complete through care everywhere and discussion with patient.  Positive for smoking.  Negative for chest pain shortness of breath    SH:    reports that she has been smoking. She has been smoking about 0.00 packs per day. She has never used smokeless tobacco.      FH: The Patient's family history is not on file.     Meds:  Corazon has a current medication list which includes the following prescription(s): amlodipine, ascorbic acid (vitamin c), aspirin, diphenhydramine, ibuprofen, lactobacillus acidophilus, multivit-min-fa-lycopen-lutein, omega-3 acid ethyl esters, psyllium husk, vit a,c and e-lutein-minerals, and vit c/e/zn/coppr/lutein/zeaxan.    O:  /72   Pulse 68   Temp 98.3  F (36.8  C)   Ht 5' (1.524 m)   Wt 113 lb (51.3 kg)   SpO2 98%   Breastfeeding? No   BMI 22.07 kg/m     Constitutional:    --Vitals as above  --No acute distress  Eyes-  --Sclera noninjected  --Lids and conjunctiva normal  ENT-  --TMs show left TM clear, right TM has cerumen impaction  --Sclera noninjected  --Pharynx not erythematous  Neck-  --Neck supple    Lymph-  --No cervical lymphadenopathy  Lungs-  --Clear to Auscultation  Heart-  --Regular rate and rhythm  Skin-  --Pink and  dry          A/P:   1. Impacted cerumen, unspecified laterality  Right TM was cleared of cerumen with no complications.  Cerumen was removed with irrigation in the exam room.    2. Smoking  Encourage smoke cessation    3. Hypertension, unspecified type  Well-controlled.  Patient will continue amlodipine 5 mg daily and follow-up for annual visits

## 2021-06-02 NOTE — PROGRESS NOTES
Chief Complaint   Patient presents with     Follow Up     Pt c/o still hearing sounds in her R ear and still has drainage. She is concerned the wound on her face is a staph infection.        HPI: Patient has 2 issues.  The first is recheck of her facial wound.  Her right facial abrasion is improving has less redness and is not painful.  She continues on the Keflex.  She is concerned that she has a staph infection as her son told her on the phone yesterday.    She is also concerned about noises and buzzing in her right ear.  This been going on for the past several weeks.  She is had no otorrhea and no history of trauma to the area.  No fever.    ROS: No otorrhea no fever.  No drainage from the facial wound.    SH:    reports that she has been smoking. She has been smoking about 0.00 packs per day. She has never used smokeless tobacco.      FH: The Patient's family history is not on file.     Meds:  Corazon has a current medication list which includes the following prescription(s): amlodipine, ascorbic acid (vitamin c), aspirin, cephalexin, diphenhydramine, ibuprofen, lactobacillus acidophilus, multivit-min-fa-lycopen-lutein, omega-3 acid ethyl esters, psyllium husk, and vit c/e/zn/coppr/lutein/zeaxan.    O:  /64   Pulse 64   Temp 98  F (36.7  C)   Resp 16   Wt 115 lb 3 oz (52.2 kg)   SpO2 98%   BMI 22.50 kg/m    Examination of the right facial wound shows its healing well and much less erythematous.  ENT- right TM is normal in the canal is normal.  Neck is supple.    A/P:   1. Abrasion  Continue Keflex.  Expectant therapy.    2. Tinnitus of right ear  The patient continues to have tinnitus and there is no evidence of infection or effusion.  Will refer to audiology.  - Ambulatory referral to Audiology

## 2021-06-02 NOTE — PROGRESS NOTES
"1. Right ear pain     2. Rash           ASSESSMENT/PLAN:     Body Mass Index was not assessed due to normal.    1. Right ear pain    -continue to monitor, no signs of infection or impaction today    2. Rash    -right face, continue to monitor      There are no Patient Instructions on file for this visit.  There are no discontinued medications.  Return in about 2 weeks (around 10/25/2019), or if symptoms worsen or fail to improve, for ear pain.        Tami Trammell NP          SUBJECTIVE:  Corazon Hester is a 78 y.o. female who presents for follow-up since her wax impaction.  She was seen in clinic 2 days ago and had cerumen removed from her ear on the right.  She states things are getting better, the humming has lowered and it is not as constant.  She denies any ringing in the ears today, she has noted some fluctuations with her hearing over the last 1 to 2 years, right greater than left.  She notices it when she is laying on her left side and her right ear is facing upward.  Her family and friends have not noticed any difficulty or changes with her hearing, she states she has not had to adjust the volume on her TV.  She denies any pain in the ear today.  She has never undergone formal audiology testing, she declines today and states \"I am not getting hearing aids until they are sold over-the-counter \".  She will let me know down the road if she changes her mind about having formal audiology testing completed.    Rash: Patient woke up with a small, nickel sized rash on her right cheek of the face.  She noted redness this morning the site is mildly raised.  She did not notice any bleeding or ulcerations, no recent bug bites, she does not recall scratching her face in the middle the night.  Denies pain or itching currently, no significant swelling noted since the rash occurred.  The only new products that she has been using are a new Neutrogena face wipe that she has been wiping on her cheeks every night " "before bed.  She denies pain in the face today.     She declined the influenza vaccination today, she states \"I do not get mine until December \".  Chief Complaint   Patient presents with     Ear Pain     RT ear - pain and fullness     spot     RT cheek - patricia sixe red spot on cheek, some tenderness x 1 day         There is no problem list on file for this patient.      Current Outpatient Medications   Medication Sig Dispense Refill     amLODIPine (NORVASC) 5 MG tablet        ascorbic acid, vitamin C, (VITAMIN C) 500 MG tablet Take by mouth.       aspirin 81 MG EC tablet Take 81 mg by mouth daily.       diphenhydrAMINE (BENADRYL) 25 mg capsule Take by mouth.       ibuprofen (ADVIL,MOTRIN) 200 MG tablet Take by mouth.       Lactobacillus acidophilus (PROBIOTIC ACIDOPHILUS ORAL) Take by mouth.       multivit-min-FA-lycopen-lutein (CENTRUM SILVER) 0.4-300-250 mg-mcg-mcg tablet Take by mouth.       omega-3 acid ethyl esters (LOVAZA) 1 gram capsule Take by mouth.       psyllium husk (METAMUCIL ORAL) Take by mouth.       vit C/E/Zn/coppr/lutein/zeaxan (PRESERVISION AREDS-2 ORAL) Take by mouth.       No current facility-administered medications for this visit.        Social History     Tobacco Use   Smoking Status Current Every Day Smoker     Packs/day: 0.00   Smokeless Tobacco Never Used       REVIEW OF SYSTEMS: Denies new soaps, detergents, lotions, makeup, foods, bedding, recent travel, sick contacts, fever or chills.      TOBACCO USE:  Social History     Tobacco Use   Smoking Status Current Every Day Smoker     Packs/day: 0.00   Smokeless Tobacco Never Used     Social History     Socioeconomic History     Marital status:      Spouse name: Not on file     Number of children: Not on file     Years of education: Not on file     Highest education level: Not on file   Occupational History     Not on file   Social Needs     Financial resource strain: Not on file     Food insecurity:     Worry: Not on file     " Inability: Not on file     Transportation needs:     Medical: Not on file     Non-medical: Not on file   Tobacco Use     Smoking status: Current Every Day Smoker     Packs/day: 0.00     Smokeless tobacco: Never Used   Substance and Sexual Activity     Alcohol use: Not on file     Drug use: Not on file     Sexual activity: Not on file   Lifestyle     Physical activity:     Days per week: Not on file     Minutes per session: Not on file     Stress: Not on file   Relationships     Social connections:     Talks on phone: Not on file     Gets together: Not on file     Attends Taoist service: Not on file     Active member of club or organization: Not on file     Attends meetings of clubs or organizations: Not on file     Relationship status: Not on file     Intimate partner violence:     Fear of current or ex partner: Not on file     Emotionally abused: Not on file     Physically abused: Not on file     Forced sexual activity: Not on file   Other Topics Concern     Not on file   Social History Narrative     Not on file         OBJECTIVE:            Vitals:    10/11/19 1528   BP: 124/60   Pulse: 73   Resp: 16   Temp: 98.1  F (36.7  C)   SpO2: 97%     Weight: 114 lb 3 oz (51.8 kg)    Wt Readings from Last 3 Encounters:   10/11/19 114 lb 3 oz (51.8 kg)   10/09/19 113 lb (51.3 kg)     Body mass index is 22.3 kg/m .        Physical Exam:  GENERAL APPEARANCE: A&A, NAD, well hydrated, well nourished  HEAD: atraumatic, no deformity  EYES: PERRL, EOM's intact, no redness or swelling  EARS: TM's normal, gray with nl light reflex, TMs negative for redness, bulging, fluid collection or perforation.  She does have a small amount of cerumen in both of the external canals.  Her right external canal does have moderate erythema today, no swelling or fluid collection is noted.  NOSE: no post nasal drainage or thrush  MOUTH: without erythema, exudate or thrush  NECK: Supple, without lymphadenopathy, no thyroid mass, no JVD, no bruit  CV:  RRR, no M/G/R   LUNGS: CTAB, normal respiratory effort  SKIN:  Normal skin turgor, nickel size rash on the right cheek of face that is moderately raised with moderate erythema, rash is dry, no pustule formation, drainage, bleeding noted.  Rash is not hot to touch, no induration noted.  Warm and dry

## 2021-06-03 VITALS
TEMPERATURE: 98.3 F | OXYGEN SATURATION: 98 % | HEIGHT: 60 IN | HEART RATE: 68 BPM | BODY MASS INDEX: 22.19 KG/M2 | SYSTOLIC BLOOD PRESSURE: 115 MMHG | DIASTOLIC BLOOD PRESSURE: 72 MMHG | WEIGHT: 113 LBS

## 2021-06-03 VITALS
TEMPERATURE: 98.7 F | BODY MASS INDEX: 22.15 KG/M2 | HEART RATE: 85 BPM | OXYGEN SATURATION: 99 % | DIASTOLIC BLOOD PRESSURE: 64 MMHG | WEIGHT: 113.44 LBS | SYSTOLIC BLOOD PRESSURE: 118 MMHG | RESPIRATION RATE: 16 BRPM

## 2021-06-03 VITALS
SYSTOLIC BLOOD PRESSURE: 120 MMHG | RESPIRATION RATE: 16 BRPM | HEART RATE: 64 BPM | WEIGHT: 115.19 LBS | TEMPERATURE: 98 F | BODY MASS INDEX: 22.5 KG/M2 | OXYGEN SATURATION: 98 % | DIASTOLIC BLOOD PRESSURE: 64 MMHG

## 2021-06-03 VITALS
OXYGEN SATURATION: 97 % | SYSTOLIC BLOOD PRESSURE: 124 MMHG | BODY MASS INDEX: 22.42 KG/M2 | RESPIRATION RATE: 16 BRPM | WEIGHT: 114.19 LBS | HEIGHT: 60 IN | HEART RATE: 73 BPM | TEMPERATURE: 98.1 F | DIASTOLIC BLOOD PRESSURE: 60 MMHG

## 2021-06-03 NOTE — TELEPHONE ENCOUNTER
Pts Care Provider: Dr. Lizama    Caller: Corazon     Phone Number: [phone]  OK to leave message: Yes    Reason for Call: Pt has follow up questions from the appointment she had with Dr. Lizama on 11/19/18. Please call to discuss

## 2021-06-03 NOTE — TELEPHONE ENCOUNTER
I called patient back and she asked what Dr. Lizama had diagnosed her with and the reason for the MRI. I explained that she has asymmetrical hearing and when we see this we typically order a MRI to rule out any mass or tumor that could cause that. She is having the MRI on Sunday and leaving for [place] for 6 months. I told her we will get her the results when we have them. She agreed.    Jaki Shore RN  [facility]  [phone]

## 2021-06-03 NOTE — PROGRESS NOTES
Corazon Hester is a 79 y.o. female seen in consultation at the request of Dr. Osullivan for hearing loss.  Patient has not noticed hearing loss.  She noted right sided tinnitus with the onset of a facial cellulitis a few weeks ago.  This has all but abated with the resolution of the infection.  Denies otologic history of infections or surgeries. Denies vertigo.   Noise exposure:  None.  Family history of hearing loss:  2 sisters with hearing aids.      ALLERGY:    Allergies   Allergen Reactions     Calcium Nausea Only       MEDICATIONS:     Current Outpatient Medications on File Prior to Visit   Medication Sig Dispense Refill     amLODIPine (NORVASC) 5 MG tablet        ascorbic acid, vitamin C, (VITAMIN C) 500 MG tablet Take by mouth.       aspirin 81 MG EC tablet Take 81 mg by mouth daily.       diphenhydrAMINE (BENADRYL) 25 mg capsule Take by mouth.       ibuprofen (ADVIL,MOTRIN) 200 MG tablet Take by mouth.       Lactobacillus acidophilus (PROBIOTIC ACIDOPHILUS ORAL) Take by mouth.       multivit-min-FA-lycopen-lutein (CENTRUM SILVER) 0.4-300-250 mg-mcg-mcg tablet Take by mouth.       omega-3 acid ethyl esters (LOVAZA) 1 gram capsule Take by mouth.       psyllium husk (METAMUCIL ORAL) Take by mouth.       vit C/E/Zn/coppr/lutein/zeaxan (PRESERVISION AREDS-2 ORAL) Take by mouth.       No current facility-administered medications on file prior to visit.        Past Medical/Surgical History, Family History and Social History reviewed in detail and documented separately in the medical record.    Complete Review of Systems:  A 10-point review was performed.  Pertinent positives are noted in the HPI and on a separate scanned document in the chart.    EXAM:  There were no vitals filed for this visit.    Nurse documentation reviewed  and documented separately.    General Appearance: Pleasant, alert, appropriate appearance for age. No acute distress    Head Exam: Normal. Normocephalic, atraumatic.    Eye Exam: Normal  external eye, conjunctiva, lids, cornea. Extra-ocular movements are intact.    Left external ear: normal  Left otoscopic exam: Normal EAC. Normal TM     Right external ear: normal  Right otoscopic exam: Normal EAC. Normal TM    Nose Exam: Normal external nose. Septum midline. Nasal mucosa normal.  Inferior turbinates normal.    OroPharynx Exam: Dental hygiene adequate. Normal tongue. Normal buccal mucosa. Normal palate.  Normal pharynx. Normal tonsils.    Neck Exam: Supple, no masses or nodes. Trachea and larynx midline.    Thyroid Exam: No tenderness, nodules or enlargement.    Salivary Glands: nontender without masses    Neuro: Alert and oriented times 3, CN 2-12 grossly intact, no nystagmus, PERRL, EOMI, normal speech and gait    Chest/Respiratory Exam: Normal chest wall motion and respiratory effort. No audible stridor or wheezing.    Cardiovascular Exam: Regular rate and rhythm.  No cyanosis, clubbing or edema.    Pulses: carotid pulses normal    Mood:  Calm, appropriate    Skin:  No conspicuous rash or lesion in the head and neck    ASSESSMENT:  1. ASNHL (asymmetrical sensorineural hearing loss)    2. Tinnitus, bilateral        PLAN: Findings, assessment, and management options were discussed. Discussed the asymmetry in hearing and need for MRI.  Discussed pathophysiology, masking techniques and triggers for tinnitus.  We discussed current guidelines in regards to approach to tinnitus.  Unfortunately there are not many satisfying answers but working on triggers may be helpful.

## 2021-06-04 VITALS
WEIGHT: 111 LBS | HEART RATE: 68 BPM | DIASTOLIC BLOOD PRESSURE: 80 MMHG | RESPIRATION RATE: 16 BRPM | OXYGEN SATURATION: 98 % | HEIGHT: 60 IN | BODY MASS INDEX: 21.79 KG/M2 | SYSTOLIC BLOOD PRESSURE: 122 MMHG

## 2021-06-04 NOTE — TELEPHONE ENCOUNTER
I spoke to Román, patients' son and explained that Dr. Lizama is longer with us and Dr. Funk reviewed the notes and MRI and said that there is no tumor seen that would cause the asymmetrical hearing loss. There is some inflammation on the left side but that would likely not cause any symptoms. I said if she has increase in hearing loss or other symptoms she can follow up with Dr. Funk. He agreed.    Jaki Shore RN  [facility]  [phone]

## 2021-06-04 NOTE — TELEPHONE ENCOUNTER
Dr Lizama ordered MRI. His partners should be able to help with results and guidance. Son will call the ENT facility at [place] and see who he can talk with. If he has troubles, he was asked to call back and Ill see if I can be of some help.

## 2021-06-04 NOTE — TELEPHONE ENCOUNTER
Test Results  Who is calling?:  SonRomán  Who ordered the test:  Brandt Osullivan MD    Type of test: Imaging  Date of test:  11/24/19  Where was the test performed:  [facility]  What are your questions/concerns?:  Calling for results of MRI   Okay to leave a detailed message?:  Yes

## 2021-06-04 NOTE — TELEPHONE ENCOUNTER
Left patient a voicemail to call back to discuss MRI results. Dr. Lizama is no longer here, but Dr. Funk reviewed the notes and MRI and said that there is no tumor seen that would cause the asymmetrical hearing loss. There is some inflammation on the left side but that would likely not cause any symptoms. Patient called back. Results explained. She expressed understanding.    Jaki Shore RN  [facility]  [phone]

## 2021-06-05 VITALS
OXYGEN SATURATION: 99 % | BODY MASS INDEX: 21.87 KG/M2 | DIASTOLIC BLOOD PRESSURE: 80 MMHG | HEART RATE: 68 BPM | SYSTOLIC BLOOD PRESSURE: 128 MMHG | RESPIRATION RATE: 16 BRPM | WEIGHT: 112 LBS

## 2021-06-09 NOTE — PROGRESS NOTES
Chief Complaint   Patient presents with     Medication Management     b/p check, VARICOSE VEINS in left leg should she get treated?     Medication Refill     [facility]       HPI: 79-year-old female with a past history of smoking presents today for medication check.  Her blood pressure remained stable on amlodipine.    She was down in [place] over winter and had a staph infection in her face which is resolved.    She continues to take multiple over-the-counter medications including vitamin C probiotics and vitamins.    ROS: No chest pain or shortness of breath.  No bowel or bladder issues.    SH: Reviewed-see Snapshot for review.     FH: Reviewed-see Snapshot for review.    Meds:  Corazon has a current medication list which includes the following prescription(s): amlodipine, ascorbic acid (vitamin c), aspirin, diphenhydramine, ibuprofen, lactobacillus acidophilus, multivit-min-fa-lycopen-lutein, mupirocin, omega-3 acid ethyl esters, psyllium husk, and vit c/e/zn/coppr/lutein/zeaxan.    O:  /80   Pulse 68   Resp 16   Ht 5' (1.524 m)   Wt 111 lb (50.3 kg)   SpO2 98%   BMI 21.68 kg/m    Constitutional:    --Vitals as above  --No acute distress  Eyes-  --Sclera noninjected  --Lids and conjunctiva normal  Neck-  --Neck supple    Lymph-  --No cervical lymphadenopathy  Lungs-  --Clear to Auscultation  Heart-  --Regular rate and rhythm  Skin-  --Pink and dry    A/P:   1. Hypertension, unspecified type  Continue amlodipine  - HM2(CBC w/o Differential)  - Basic Metabolic Panel    2. Smoking  Encourage smoke cessation    3.  Health maintenance  -Encouraged mammogram and she will consider    RTC 1 year

## 2021-06-12 NOTE — PROGRESS NOTES
S:  Patient presents with constant nagging postnasal drip.  It has been there many years but it is bothering her more frequently these days.  She is getting regularly for [place] and would like something to help clear this up.  She has used Benadryl with some effect.    Medications: Amlodipine, Benadryl, ibuprofen, and multiple vitamins.    O:   Blood pressure 128/80 pulse 88 respirations 16  ENT-TMs clear sclera noninjected nares shows mild deviated septum to the left and no hyperemia    A:   Postnasal drainage    P:   Most likely due to allergic rhinitis.  Flonase 1 squirt each nostril twice daily prescribed.  Patient will follow-up if not improving would consider ENT consult.

## 2021-06-17 NOTE — PROGRESS NOTES
S:  Patient presents to discuss her chronic medical issues.  Her hypertension remained stable on amlodipine.    She was in [place] over the winter and had a left-sided thrombophlebitis that was remarkably painful.  She was here to follow-up for that.  She has had vein surgery in the past on that leg but several varicose veins remain.    She continues to smoke and was encouraged to quit.    Medications: Amlodipine, aspirin, Benadryl and ibuprofen as needed, over-the-counter supplements    O:   Blood pressure 120/84, pulse 75, respiration 12, weight 111  Constitutional:    --Vitals as above  --No acute distress  Eyes-  --Sclera noninjected  --Lids and conjunctiva normal  Neck-  --Neck supple    Lungs-  --Clear to Auscultation  Heart-  --Regular rate and rhythm  Skin-  --Pink and dry with notable varicosities in the left lower extremity with no evidence of acute thrombophlebitis          A:   Hypertension  Tobacco use  Varicose veins with painful thrombophlebitis      P:   Referral to vascular surgery for treatment of varicosities  Continue P  Encourage smoke cessation  Continue exercise  CBC BMP lipid panel  RTC at regular intervals

## 2021-06-20 NOTE — LETTER
Letter by Brandt Oslulivan MD at      Author: Brandt Osullivan MD Service: -- Author Type: --    Filed:  Encounter Date: 7/7/2020 Status: (Other)         Corazon Hester  [address]  [place] [address]            July 7, 2020        Dear Ms. Hester,        Below are the results from your recent visit:    Resulted Orders   HM2(CBC w/o Differential)   Result Value Ref Range    WBC 5.4 4.0 - 11.0 thou/uL    RBC 4.79 3.80 - 5.40 mill/uL    Hemoglobin 15.7 12.0 - 16.0 g/dL    Hematocrit 45.3 35.0 - 47.0 %    MCV 95 80 - 100 fL    MCH 32.8 27.0 - 34.0 pg    MCHC 34.7 32.0 - 36.0 g/dL    RDW 11.6 11.0 - 14.5 %    Platelets 215 140 - 440 thou/uL    MPV 7.0 7.0 - 10.0 fL   Basic Metabolic Panel   Result Value Ref Range    Sodium 140 136 - 145 mmol/L    Potassium 4.5 3.5 - 5.0 mmol/L    Chloride 105 98 - 107 mmol/L    CO2 27 22 - 31 mmol/L    Anion Gap, Calculation 8 5 - 18 mmol/L    Glucose 94 70 - 125 mg/dL    Calcium 9.7 8.5 - 10.5 mg/dL    BUN 8 8 - 28 mg/dL    Creatinine 0.77 0.60 - 1.10 mg/dL    GFR MDRD Af Amer >60 >60 mL/min/1.73m2    GFR MDRD Non Af Amer >60 >60 mL/min/1.73m2    Narrative    Fasting Glucose reference range is 70-99 mg/dL per  American Diabetes Association (ADA) guidelines.         Corazon, your laboratory results are normal.  That is good news.  Thank you for coming in to visit.  We should visit again in 1 year.      Sincerely,        JEFF Osullivan MD, RAGHAV  [facility]  [phone]

## 2021-06-21 NOTE — LETTER
Letter by Brandt Osullivan MD at      Author: Brandt Osullivan MD Service: -- Author Type: --    Filed:  Encounter Date: 5/11/2021 Status: (Other)         Corazon Hester  [address]  [place] [address]            May 11, 2021        Dear Ms. Hester,        Below are the results from your recent visit:    Resulted Orders   HM2(CBC w/o Differential)   Result Value Ref Range    WBC 5.3 4.0 - 11.0 thou/uL    RBC 4.86 3.80 - 5.40 mill/uL    Hemoglobin 15.6 12.0 - 16.0 g/dL    Hematocrit 46.8 35.0 - 47.0 %    MCV 96 80 - 100 fL    MCH 32.1 27.0 - 34.0 pg    MCHC 33.3 32.0 - 36.0 g/dL    RDW 12.6 11.0 - 14.5 %    Platelets 215 140 - 440 thou/uL    MPV 8.7 7.0 - 10.0 fL   Basic Metabolic Panel   Result Value Ref Range    Sodium 141 136 - 145 mmol/L    Potassium 4.3 3.5 - 5.0 mmol/L    Chloride 104 98 - 107 mmol/L    CO2 28 22 - 31 mmol/L    Anion Gap, Calculation 9 5 - 18 mmol/L    Glucose 92 70 - 125 mg/dL    Calcium 9.5 8.5 - 10.5 mg/dL    BUN 10 8 - 28 mg/dL    Creatinine 0.76 0.60 - 1.10 mg/dL    GFR MDRD Af Amer >60 >60 mL/min/1.73m2    GFR MDRD Non Af Amer >60 >60 mL/min/1.73m2    Narrative    Fasting Glucose reference range is 70-99 mg/dL per  American Diabetes Association (ADA) guidelines.   Lipid Cascade   Result Value Ref Range    Cholesterol 267 (H) <=199 mg/dL    Triglycerides 56 <=149 mg/dL    HDL Cholesterol 128 >=50 mg/dL    LDL Calculated 128 <=129 mg/dL    Patient Fasting > 8hrs? Yes          Corazon, your laboratory results look perfect.  Keep up the healthy lifestyle.  Please come in before you go back down to [place] in November.    Sincerely,        JEFF Osullivan MD, RAGHAV  [facility]  [phone]

## 2021-06-28 NOTE — PROGRESS NOTES
Progress Notes by Brandt Osullivan MD at 10/14/2019  9:45 AM     Author: Brandt Osullivan MD Service: -- Author Type: Physician    Filed: 10/14/2019 11:05 AM Encounter Date: 10/14/2019 Status: Signed    : Brandt Osullivan MD (Physician)       Chief Complaint   Patient presents with   ? Ear Problem     Pt c/o humming in R ear   ? Adenopathy     Pt c/o swollen glands and sore on R face cheek.       HPI: 70-year-old female who was seen last week continues to have mild right ear pain but with new and different is a sore on her right cheek that occurred several days ago.  She just woke up in the morning and had a sore on her cheek.  No history of trauma.  She did not fall according to her recollection.  He did not bleed.  She is also noticed increased lymphadenopathy.    ROS: No trauma.  No tooth pain.  No fever.    SH:    reports that she has been smoking. She has been smoking about 0.00 packs per day. She has never used smokeless tobacco.      FH: The Patient's family history is not on file.     Meds:  Corazon has a current medication list which includes the following prescription(s): amlodipine, ascorbic acid (vitamin c), aspirin, diphenhydramine, ibuprofen, lactobacillus acidophilus, multivit-min-fa-lycopen-lutein, omega-3 acid ethyl esters, psyllium husk, vit c/e/zn/coppr/lutein/zeaxan, and cephalexin.    O:  /64   Pulse 85   Temp 98.7  F (37.1  C)   Resp 16   Wt 113 lb 7 oz (51.5 kg)   SpO2 99%   BMI 22.15 kg/m    Alert conversant no acute distress  TMs clear bilaterally with no ceruminosis  There is a 3 cm circular area of erythema on the right cheek see photo below:      Neck-supple with no meningitis symptoms.  Positive for mild painful lymphadenopathy  Lungs clear  Heart regular    A/P:   1. Cellulitis of face  The patient has cellulitis.  This is in the context of smoking.  Will treat with Keflex and close clinical follow-up in 72 hours.  - cephalexin (KEFLEX) 500 MG capsule;  Take 1 capsule (500 mg total) by mouth 4 (four) times a day for 10 days.  Dispense: 40 capsule; Refill: 0    2.  Smoking-patient encouraged to quit

## 2021-06-28 NOTE — PROGRESS NOTES
"Progress Notes by Lea Trevino AuD at 10/31/2019  3:30 PM     Author: Lea Trevino AuD Service: -- Author Type: Audiologist    Filed: 10/31/2019  4:11 PM Encounter Date: 10/31/2019 Status: Signed    : Lea Trevino AuD (Audiologist)       Audiology only; referred by Brandt Osullivan    Summary:  Audiology visit completed. Please see audiogram below or under \"media\" tab for history and results.    Transducer:  Both insert phones and circumaural headphones were used.    Reliability:    Good    Recommendations:  Follow-up with PCP; ENT referral recommended due to asymmetrical hearing loss and unilateral tinnitus (scheduled with G. Service 11-19-19). Retest hearing annually (to monitor) or per medical management/patient concern.  Wear hearing protection consistently in noise to preserve residual hearing sensitivity and to minimize effects of tinnitus.  Corazon Hester is a potential binaural amplification candidate for both hearing loss and tinnitus management, if patient motivation exists and medical clearance is granted.    Isabel Manrique, [facility]  [place] Licensed Audiologist [id number]           "

## 2021-08-03 ENCOUNTER — OFFICE VISIT (OUTPATIENT)
Dept: VASCULAR SURGERY | Facility: CLINIC | Age: 81
End: 2021-08-03
Attending: SPECIALIST
Payer: COMMERCIAL

## 2021-08-03 VITALS — HEART RATE: 72 BPM | SYSTOLIC BLOOD PRESSURE: 126 MMHG | TEMPERATURE: 98.2 F | DIASTOLIC BLOOD PRESSURE: 78 MMHG

## 2021-08-03 DIAGNOSIS — I83.893 VARICOSE VEINS OF BILATERAL LOWER EXTREMITIES WITH OTHER COMPLICATIONS: Primary | ICD-10-CM

## 2021-08-03 PROCEDURE — G0463 HOSPITAL OUTPT CLINIC VISIT: HCPCS

## 2021-08-03 PROCEDURE — 99203 OFFICE O/P NEW LOW 30 MIN: CPT | Performed by: SPECIALIST

## 2021-08-03 RX ORDER — MUPIROCIN 20 MG/G
OINTMENT TOPICAL
COMMUNITY
Start: 2020-05-14 | End: 2023-05-12

## 2021-08-03 ASSESSMENT — PAIN SCALES - GENERAL: PAINLEVEL: NO PAIN (0)

## 2021-08-03 NOTE — NURSING NOTE
This is a new consult for Varicose Veins. The patient has varicose veins that are problematic in left legs. Symptoms patient has been experiencing are discoloration and  swelling. Patient has not been wearing compression stockings.     Discoloration  is present.  Pt has not been using pain medication or antiinflammatory's.

## 2021-08-03 NOTE — PATIENT INSTRUCTIONS
"We are prescribing some compression stockings for you. I have included different suppliers that should help you get measured and fitting to ensure proper fitting socks. You should wear these socks as much as you can. It is especially important to wear them with long periods of sitting/standing, long car rides or if you will be flying. Compression socks should get refilled every 4-6 months. They do not need to be worn at night while in bed.    If you do a lot of standing it is good to do calf raises to help keep the blood pumping. If you sit a lot at work it is good to get up periodically to walk around. Elevation of the foot of your bed 4-6\" helps the blood return back to where it is needed.    We would like you to follow up in 3 months after wearing socks to see how you are doing.    Varicose Veins  Varicose veins are swollen, enlarged veins most often found in the legs. They are usually blue or purple in color and may bulge, twist, and stand out under the skin.  Normally, veins return blood from the body to the heart. The leg veins have one-way valves that prevent blood from flowing backward in the vein. When the valves are weak or damaged, blood backs up in the veins. This may cause some of the veins to swell and bulge and become varicose veins.    Symptoms  Varicose veins may or may not cause symptoms. If symptoms do occur, they can include:    Legs that feel tired, achy, heavy, or itchy    Leg muscle cramps    Skin changes, such as discoloration, dryness, redness, or rash (in more severe cases, you may also have sores on the skin called venous leg ulcers)    Risk Factors  There are a number of factors that increase the risk for varicose veins. These can include:    Being a woman    Being older    Sitting or standing for long periods    Being overweight    Being pregnant    Having a family history of varicose veins  Treatment begins with simple self-help measures (see below). If these don't help, there are many " procedures that can be done to shrink or remove varicose veins. Your healthcare provider can tell you more about these options, if needed.    Home care    Support or compression stockings will likely be prescribed. If so, be sure to wear them as directed. They may help improve blood flow.    Exercising helps strengthen your leg muscles and improve blood flow. To get the most benefit, choose exercises such as walking, swimming, or cycling. Also try to exercise for at least 30 minutes on most days.    Raising (elevating) your legs lets gravity help blood flow back to the heart. Sit or lie with your feet above heart level a few times throughout the day, or as directed.    Avoid long periods of sitting or standing. Change positions often. Also, move your ankles, toes and knees often. This may also help improve blood flow.    If you are overweight, talk with your healthcare provider about setting up a weight-loss plan. Maintaining a healthy weight can help reduce the strain on your veins. It may also improve symptoms, such as swelling and aching.    If you have dryness and itching, ask your provider about special lotions that can be applied to the skin to help improve symptoms.    Follow-up care  Follow up with your healthcare provider, or as directed. If imaging tests were done, you'll be told the results and if there are any new findings that affect your care.    When to seek medical advice  Call your healthcare provider right away if any of these occur:    Sudden, severe leg swelling, pain, or redness    Symptoms worsen, or they don't improve with self-care    Bleeding from any affected veins    Ulcers form on the legs, ankles, or feet    Fever of 100.4 F (38 C) or higher, or as advised by your provider    Understanding Spider and Varicose Veins  Do you often hide your legs because of the way they look? You may have noticed tiny red or blue bursts (spider veins). Or maybe you have veins that bulge or look twisted  (varicose veins). If so, there are treatments that can help    What are the symptoms?  Spider veins or varicose veins may never be a problem. But sometimes they can cause legs to ache or swell. Your legs may also feel heavy and tired, or like they're burning. These symptoms may be more severe at the end of the day. Prolonged sitting or standing can also make your symptoms worse.    Who gets spider and varicose veins?  Anyone can get spider or varicose veins. But vein problems tend to be hereditary (run in families). Other factors that can affect veins include:    Pregnancy, hormones, and birth control pills    A job where you stand or sit a lot    Extra weight or lack of exercise    Age    What can be done?  Spider and varicose veins can affect the way you feel about yourself. Talk to your healthcare provider about your concerns. There are treatments that can ease symptoms and make your legs look better.    Your treatment choices  Treatment may include self-care, sclerotherapy (injecting veins with a chemical), surgery, or newer nonsurgical minimally invasive therapies. Spider veins and some varicose veins can be treated with sclerotherapy. Large varicose veins can often be treated with newer minimally invasive procedures and, in rare cases, surgery may be needed.

## 2021-08-05 NOTE — PROGRESS NOTES
[facility] Vein Consult      Assessment:     1. varicose veins, bilateral   2. spider veins, bilateral     Plan:     1. Treatment options of conservative therapy of stockings use, exercise, weight loss, elevating legs when possible.    2. Script for compression stockings 20-30 mm hg  3. Ultrasound to evaluate legs for incompetency of both deep and superficial system .   4. Surgical treatment, discussed briefly today.  5. Follow up: 3 months.   6. Call for any questions concerns or issues    Subjective:      Corazon Hester is a 80 year old female  who was referred by Brandt Osullivan  for evaluation of varicose veins. Symptoms include pain, aching, fatigue, burning, edema and dermatitis. Patient has history of leg selling, pain and vein issues that have progressed. Pain and symptoms have affected daily living and work activities needing medications. Here for evaluation today. no stocking or compression devic use    Allergies:Calcium and Tetracycline    Past Medical History:   Diagnosis Date     Duodenal ulcer, unspecified as acute or chronic, without hemorrhage, perforation, or obstruction 1950s?     GERD (gastroesophageal reflux disease)     Described as belching     HTN (hypertension) 2009     Tobacco abuse        Past Surgical History:   Procedure Laterality Date     HC DILATION/CURETTAGE DIAG/THER NON OB      x3-4     HC REMOVAL OF TONSILS,<11 Y/O       STRIP VEIN  9-11    left leg         Current Outpatient Medications:      amLODIPine (NORVASC) 5 MG tablet, Take 1 tablet (5 mg) by mouth daily, Disp: 90 tablet, Rfl: 3     ASPIRIN 325 MG OR TBEC, 1 TABLET EVERY DAY, Disp: , Rfl:      BENADRYL 25 MG OR CAPS, ONE TO TWO CAPSULES EVERY 4-6 HOURS AS NEEDED, Disp: , Rfl: 0     CENTRUM SILVER OR TABS, 1 TABLET DAILY, Disp: , Rfl:      FISH OIL 1000 MG OR CAPS, 1 cap daily , Disp: 0, Rfl: 0     Ibuprofen (ADVIL PO), , Disp: , Rfl:      Lactobacillus (PROBIOTIC ACIDOPHILUS PO), , Disp: , Rfl:       METAMUCIL OR, one time daily, Disp: , Rfl:      multivitamin (OCUVITE) TABS, Take 1 tablet by mouth daily., Disp: , Rfl:      mupirocin (BACTROBAN) 2 % external ointment, , Disp: , Rfl:      VITAMIN C 500 MG OR TABS, one qd, Disp: , Rfl:      Family History   Problem Relation Age of Onset     Cancer Father         Bladder     Hypertension Sister         reports that she has been smoking. She has a 12.50 pack-year smoking history. She has never used smokeless tobacco. She reports current alcohol use of about 7.0 standard drinks of alcohol per week. She reports that she does not use drugs.      Review of Systems:  A comprehensive review of systems was negative.   Patient has symptomatic veins and changes of bilateral legs. These have progressed to the point of causing symptoms on a daily basis. This causes issues with daily activities and chores such as washing dishes, vacuuming, outdoor upkeep and standing for long lengths of time       Objective:     Vitals:    08/03/21 1105   BP: 126/78   Pulse: 72   Temp: 98.2  F (36.8  C)     There is no height or weight on file to calculate BMI.    EXAM:  GENERAL: This is a well-developed 80 year old female who appears her stated age  HEAD: normocephalic  HEENT: Pupils equal and reactive bilaterally  MOUTH: mucus membranes intact. Normal dentation  CARDIAC: RRR without murmur  CHEST/LUNG:  Clear to auscultation bilaterally  ABDOMEN: Soft, nontender, nondistended, no masses noted   NEUROLOGIC: Focally intact, nonfocal, alert and oriented x 3  INTEGUMENT: No open lesions or ulcers  VASCULAR: Pulses intact, symmetrical upper and lower extremities. There areskin changes consistent with chronic venous insufficiency. Varicose veins present in bilateral greater saphenous distribution. Spider veins present bilateral.                    Imaging:    pending    Lawson Tate MD  General Surgery [phone]  Vascular Surgery [phone]

## 2021-09-24 ENCOUNTER — TELEPHONE (OUTPATIENT)
Dept: VASCULAR SURGERY | Facility: CLINIC | Age: 81
End: 2021-09-24

## 2021-09-24 DIAGNOSIS — I83.893 VARICOSE VEINS OF BILATERAL LOWER EXTREMITIES WITH OTHER COMPLICATIONS: Primary | ICD-10-CM

## 2021-09-24 DIAGNOSIS — I82.90 BLOOD CLOT IN VEIN: ICD-10-CM

## 2021-09-24 DIAGNOSIS — R22.42 LOCALIZED SWELLING, MASS AND LUMP, LEFT LOWER LIMB: ICD-10-CM

## 2021-09-24 NOTE — TELEPHONE ENCOUNTER
"Pt called to inform staff she thinks she \"may have a blood clot\" in her leg. Pt has a f/u appt with Dr. Tate in November. Provider is booked out into November. Pt would like guidance as to what she should do regarding this possible \"blood clot.\" Please call pt at both numbers to reach her.   "

## 2021-09-24 NOTE — TELEPHONE ENCOUNTER
Writer spoke with pt. She stated that she hit a table yesterday and has a mass on her upper left thigh. No pains with walking. She states that she has a history of blood clots to this area. Writer explained that it sounds like she has a hematoma. Writer offered pt to have an US today to rule out DVT. She is going to observe over the weekend. US scheduled Monday if needed. She will go to ED if she experiences any new symptoms or pain.

## 2021-10-19 ENCOUNTER — OFFICE VISIT (OUTPATIENT)
Dept: FAMILY MEDICINE | Facility: CLINIC | Age: 81
End: 2021-10-19
Payer: COMMERCIAL

## 2021-10-19 VITALS
HEART RATE: 73 BPM | BODY MASS INDEX: 22.34 KG/M2 | DIASTOLIC BLOOD PRESSURE: 70 MMHG | SYSTOLIC BLOOD PRESSURE: 126 MMHG | OXYGEN SATURATION: 99 % | WEIGHT: 114.4 LBS

## 2021-10-19 DIAGNOSIS — Z23 HIGH PRIORITY FOR 2019-NCOV VACCINE: ICD-10-CM

## 2021-10-19 DIAGNOSIS — S46.011A ROTATOR CUFF STRAIN, RIGHT, INITIAL ENCOUNTER: Primary | ICD-10-CM

## 2021-10-19 PROCEDURE — 91300 COVID-19,PF,PFIZER (12+ YRS): CPT | Performed by: FAMILY MEDICINE

## 2021-10-19 PROCEDURE — 0004A COVID-19,PF,PFIZER (12+ YRS): CPT | Performed by: FAMILY MEDICINE

## 2021-10-19 PROCEDURE — 99213 OFFICE O/P EST LOW 20 MIN: CPT | Mod: 25 | Performed by: FAMILY MEDICINE

## 2021-10-19 NOTE — PROGRESS NOTES
Assessment / Plan    Corazon was seen today for imm/inj and shoulder pain.    Diagnoses and all orders for this visit:    Rotator cuff strain, right, initial encounter  -     Physical Therapy Referral; Future    High priority for 2019-nCoV vaccine    Other orders  -     COVID-19,PF,PFIZER  -     REVIEW OF HEALTH MAINTENANCE PROTOCOL ORDERS    patient with symptoms and exam consistent with right rotator cuff strain. Reviewed treatment options and patient prefers to see physical therapy.  Referral is placed.  Patient is advised that she should be receiving a phone call to help schedule visit.  Discussed home exercise program but patient prefers to review with therapist, which is entirely reasonable. Recommend avoidance of activities that cause pain such as prolonged extension of arm.  Expect that symptoms should begin to improve within 6 weeks of starting physical therapy.  Follow-up advised if not improving as expected.      Patient instructions:   Your exam is consistent with right rotator cuff strain (supraspinatus tendon).   I would recommend a course of physical therapy.  One of the schedulers should be calling you in the next couple of days to set up an appointment.  Please feel free to call them at the number below if you do not hear from them by the end of the week.     I would think that you could reasonably take 1 tablet of advil at bedtime for the next 6 weeks (ideally with food).     Return in about 6 weeks (around 11/30/2021) for Follow up if not improving as expected .    28 minutes spent on the date of the encounter doing chart review, history and exam, documentation and further activities per the note.    Subjective   Corazon Hester is a 80 year old year old female who presents with the following concerns:     Rigth Shoulder Pain  Patient complains of right shoulder pain. The symptoms began 6 weeks ago. Aggravating factors: patient thinks that it started after doing a lot of skyping with her son in  [place].  With that activity she was holding the right arm extended for prolonged periods.. Pain is located in the RIGHT  glenohumeral region. Discomfort is described as aching and occasionally will feel more of a stabbing pain. Symptoms are exacerbated by lying on opposite shoulder, prolonged extension of the arm.  . Evaluation to date: none. Therapy to date includes: nothing specific. Symptoms do not seem to be worsening but haven't gotten much better with relative rest, either.  No history of shoulder problems.     Patient Active Problem List   Diagnosis     Hypertension; goal < 150/90     Advanced directives, counseling/discussion     Tobacco abuse     Bilateral carpal tunnel syndrome     Past Surgical History:   Procedure Laterality Date     HC DILATION/CURETTAGE DIAG/THER NON OB      x3-4     HC REMOVAL OF TONSILS,<11 Y/O       STRIP VEIN  9-11    left leg       Social History     Tobacco Use     Smoking status: Current Every Day Smoker     Packs/day: 0.25     Years: 50.00     Pack years: 12.50     Smokeless tobacco: Never Used     Tobacco comment: 3-4 cigarettes per day   Substance Use Topics     Alcohol use: Yes     Alcohol/week: 7.0 standard drinks     Comment: 7 drinks per week     Family History   Problem Relation Age of Onset     Cancer Father         Bladder     Hypertension Sister          Current Outpatient Medications   Medication Sig Dispense Refill     amLODIPine (NORVASC) 5 MG tablet Take 1 tablet (5 mg) by mouth daily 90 tablet 3     aspirin (ASA) 81 MG EC tablet        BENADRYL 25 MG OR CAPS ONE TO TWO CAPSULES EVERY 4-6 HOURS AS NEEDED  0     CENTRUM SILVER OR TABS 1 TABLET DAILY       FISH OIL 1000 MG OR CAPS 1 cap daily  0 0     Lactobacillus (PROBIOTIC ACIDOPHILUS PO)        METAMUCIL OR one time daily       multivitamin (OCUVITE) TABS Take 1 tablet by mouth daily.       VITAMIN C 500 MG OR TABS one qd       Ibuprofen (ADVIL PO)        mupirocin (BACTROBAN) 2 % external ointment        Allergies    Allergen Reactions     Calcium GI Disturbance     Tetracycline Diarrhea       ROS:   Negative except as noted above in HPI.     Objective  /70   Pulse 73   Wt 51.9 kg (114 lb 6.4 oz)   SpO2 99%   Breastfeeding No   BMI 22.34 kg/m       General: Alert, no acute distress.   Affect: pleasant, cooperative.  Right Shoulder  Bruising:   absent   Crepitus:  absent   Joint Tenderness:  bicipital groove   Effusion:   none present   Winging Scapula:   negative   Active ROM:   pos [place] sign.  Normal flexion/extension, abduction, and internal/external rotation. Pain noted with  opposite extremity examined and normal unless otherwise noted above   Atrophy:   none noted   Strength:  biceps 5/5, triceps 5/5, abduction 5/5, adduction 5/5, external rotation with shoulder at side 4/5, flexion 5/5, extension 5/5        Farhad Casye MD   Family medicine physician  [facility]

## 2021-10-19 NOTE — PATIENT INSTRUCTIONS
Patient Education   Your exam is consistent with right rotator cuff strain (supraspinatus tendon).   I would recommend a course of physical therapy.  One of the schedulers should be calling you in the next couple of days to set up an appointment.  Please feel free to call them at the number below if you do not hear from them by the end of the week.     I would think that you could reasonably take 1 tablet of advil at bedtime for the next 6 weeks (ideally with food).   Understanding Rotator Cuff Tendonitis    The rotator cuff is a group of 4 muscles and tendons in the shoulder. Tendons are tough tissues that connect muscles to bone. The 4 muscles and their tendons form a  cuff  around the head of the upper arm bone. The rotator cuff connects the upper arm to the shoulder blade. It keeps the shoulder joint stable and gives it strength. It also helps the shoulder joint with certain movements. These include reaching the arm over the head and rotating the arm.  If tendons are injured or strained, they may get irritated and swollen (inflamed). This is called tendonitis. Rotator cuff tendonitis may cause shoulder pain. It may make it hard to move your shoulder.  What causes rotator cuff tendonitis?  When the rotator cuff tendons are injured or overworked it causes tendonitis. The most common cause of injury is repetitive overhead activities. These can be work-related activities such as reaching, pushing, or lifting. Or they can be sports-related activities such as throwing, swimming, or lifting weights.  Symptoms of rotator cuff tendonitis  Pain on the side of the upper arm at the shoulder is the most common symptom. Pain may get worse with overhead movements. Or it can get worse when you raise the arm above shoulder level. It may also hurt to lie on the shoulder at night.  Treatment for rotator cuff tendonitis  Treatment may include:    Active rest. This lets the rotator cuff heal. Active rest means using your arm  and shoulder, but not doing activities that cause pain. These might be reaching overhead or sleeping on the shoulder.    Cold packs. Putting ice packs on the shoulder helps reduce swelling and ease pain.    Medicines. Prescription or over-the-counter medicines can help ease pain and swelling. NSAIDs (nonsteroidal anti-inflammatory drugs) are the most common medicines used. They may be taken as pills. Or they may be put on the skin as a gel, cream, or patch.    Arm and shoulder exercises. These help keep the shoulder joint moving as it heals. They also help improve the strength of muscles around the joint.  Possible complications  You may be tempted to stop using your shoulder completely to prevent pain. But doing so may lead to a condition called frozen shoulder. To help prevent this, follow instructions you are given for active rest and for doing exercises to help your shoulder heal.  When to call your healthcare provider  Call your healthcare provider right away if you have any of these:    Fever of 100.4 F (38 C) or higher, or as advised by your healthcare provider    Chills    Symptoms that don t get better, or get worse    New symptoms  Oscar last reviewed this educational content on 6/1/2019 2000-2021 [employer]. All rights reserved. This information is not intended as a substitute for professional medical care. Always follow your healthcare professional's instructions.

## 2021-10-25 ENCOUNTER — TELEPHONE (OUTPATIENT)
Dept: FAMILY MEDICINE | Facility: CLINIC | Age: 81
End: 2021-10-25

## 2021-10-25 NOTE — TELEPHONE ENCOUNTER
Please phone patient.  Would she like printed copies of the exercises I had referenced at her recent visit? If so, we could copy the rotator cuff section from my sports medicine exercise book to mail or leave for her at .    Thanks,  Farhad Casey MD

## 2021-10-25 NOTE — TELEPHONE ENCOUNTER
Pt saw Dr. Casey on 10/19/2021 and he referred her to PT, but she can't get in with them right away (she's on a cancellation list) .. Pt states that Dr. Casey told her that we have 'exercise sheets' here to get her started.     Please call Patient back at [phone] with more information on this.

## 2021-11-02 ENCOUNTER — ANCILLARY PROCEDURE (OUTPATIENT)
Dept: VASCULAR ULTRASOUND | Facility: CLINIC | Age: 81
End: 2021-11-02
Attending: SPECIALIST
Payer: COMMERCIAL

## 2021-11-02 ENCOUNTER — OFFICE VISIT (OUTPATIENT)
Dept: VASCULAR SURGERY | Facility: CLINIC | Age: 81
End: 2021-11-02
Attending: SPECIALIST
Payer: COMMERCIAL

## 2021-11-02 VITALS — TEMPERATURE: 98.1 F | DIASTOLIC BLOOD PRESSURE: 80 MMHG | HEART RATE: 72 BPM | SYSTOLIC BLOOD PRESSURE: 122 MMHG

## 2021-11-02 DIAGNOSIS — I83.893 VARICOSE VEINS OF BILATERAL LOWER EXTREMITIES WITH OTHER COMPLICATIONS: Primary | ICD-10-CM

## 2021-11-02 DIAGNOSIS — I83.893 VARICOSE VEINS OF BILATERAL LOWER EXTREMITIES WITH OTHER COMPLICATIONS: ICD-10-CM

## 2021-11-02 PROCEDURE — 99213 OFFICE O/P EST LOW 20 MIN: CPT | Performed by: SPECIALIST

## 2021-11-02 PROCEDURE — 93970 EXTREMITY STUDY: CPT

## 2021-11-02 ASSESSMENT — PAIN SCALES - GENERAL: PAINLEVEL: NO PAIN (0)

## 2021-11-02 NOTE — PATIENT INSTRUCTIONS
Varicose Vein Pre-Procedure Instructions/Vein Ablation     You are scheduled for a varicose vein treatment on your legs. The following is some helpful information for you, in regards to your treatment.    **Important:  A  will be needed post procedure.  (Unable to use Taxi or Uber).     We will supply a thigh high compression stocking for you. Please bring your compression sock as we only have sizes medium to X-large.    Please be aware, it is not advised to fly within 3 weeks post procedure    Please wear comfortable clothing.  We recommend that you bring a change of under clothes; they may get stained by the cleansing solution.    Feel free to bring a personal music player or a CD to listen to during your procedure.    Take your routine medications as you normally would with exception to blood thinners. Aspirin is ok to continue.    If you take Warfarin, Xarelto, or Eliquis this will need to be HELD prior to procedure according to primary care provider/doctor or cardiology who prescribes this medication.    Please notify us if you take this medication.    It is ok to eat prior to this procedure.    Please allow 1- 2 hours for your appointment.    For any questions regarding your procedure please call   [phone] to speak with the nurse.    If you would like a [place] Estimate for your upcoming service/procedure contact Cost of Care Estimates at [phone], advocates are available Monday through Friday 8am - 5pm.    Radiofrequency Ablation Codes  35398 for first vein in either leg  10494 for second vein    Please have the following information available:  1. Patient name and date of birth  2. Insurance company, plan name, ID and group numbers  3. Description of the service/procedure and the associated procedure code numbers, if available. If more than one (1) procedure code, indicate which will be the primary procedure code.   4. The facility where the service/procedure will be performed.  5. The  name of the physician involved with the service/procedure.  6. Appointment date of service.  7. Telephone number to call with the information.    Radiofrequency Ablation (RFA) Treatment for Varicose Veins    Radiofrequency ablation (RFA) is a procedure to treat varicose veins. It uses heat created from radiofrequency (RF).    Varicose veins are swollen, enlarged veins. They happen most often in the legs. Varicose veins can develop when valves in your veins become damaged. This causes problems with blood flow. Over time, too much blood collects in your veins. The veins may bulge, twist, and stand out under your skin. They can also cause symptoms such as aching, cramping, or swelling in your legs.    During RFA treatment, RF heat is sent into your vein through a thin, flexible tube (catheter). This closes off blood flow in the main problem vein.           With RFA treatment, a catheter that contains RF heat is used to seal off the main problem vein.      Getting ready for your treatment  Follow any instructions from your healthcare provider.    Tell your provider if you:    Are pregnant or think you may be pregnant    Are breastfeeding    Smoke or use alcohol on a regular basis    Have any allergies or intolerances to certain medicines. Explain what reaction you have had to these medicines in the past.    Tell your provider about any medicines you are taking. You may need to stop taking all or some of these before the test. This includes:    Medicines that can thin your blood or prevent clotting (anticoagulants)    All prescription medicines    Over-the-counter medicines such as aspirin or ibuprofen    Street drugs    Herbs, vitamins, and other supplements    Follow any directions you're given for not eating or drinking before the procedure.    The day of your treatment  The treatment takes 45 to 60 minutes. The entire treatment (including time to prepare and recover) takes about 1 to 3 hours. You can go home the same  day. For the treatment:     You'll lie down on a hospital bed.    An imaging method, such as ultrasound, is used to guide the procedure.    The leg to be treated is injected with numbing medicine.    Once your leg is numb, a needle makes a small hole (puncture) in the vein to be treated.    The catheter with the RF heat source is inserted into your vein.    More numbing medicine may be injected around your vein.    Once the catheter is in the right position, it is then slowly drawn backward. As the catheter sends out heat, the vein is closed off.    In some cases, other side branch varicose veins may be removed or tied off through a few small cuts (incisions).    When the treatment is done, the catheter is removed. Pressure is applied to the insertion site to stop any bleeding. An elastic compression stocking or a bandage may then be put on your leg.    Recovering at home  Once at home, follow all the instructions you've been given. Be sure to:    Take all medicines as directed    Care for the catheter insertion site as directed    Check for signs of infection at the catheter insertion site (see below)    Wear elastic stockings or bandages as directed    Keep your legs raised (elevated) as directed    Walk a few times a day    Avoid heavy exercise, lifting, and standing for long periods as advised    Avoid air travel, hot baths, saunas, or whirlpools as advised    Call your healthcare provider  Call your healthcare provider if you have any of the following:    Fever of 100.4 F (38 C) or higher, or as directed by your provider    Chest pain or trouble breathing    Signs of infection at the catheter insertion site. These include increased redness or swelling (inflammation), warmth, increasing pain, bleeding, or bad-smelling discharge.    Severe numbness or tingling in the treated leg    Severe pain or swelling in the treated leg       Follow-up  You'll have a follow-up visit with your healthcare provider within a  week. An ultrasound will be done to check for problems, such as blood clots. Your provider will discuss further treatments with you, if needed.  Risks and possible complications   These include the following:    Bleeding    Infection    Blood clots    Damage to the nerves in the treated area    Irritation or burning of the skin over the treated vein    Treatment doesn't improve the look or the symptoms of the problem veins    Risks of any medicines used during the treatment      Date Last Reviewed: 5/1/2016 2000-2017 [employer] [address] [place], [place] [address]. All rights reserved. This information is not intended as a substitute for professional medical care. Always follow your healthcare professional's instructions.    Self-Care for Spider and Varicose Veins  Your healthcare provider may suggest that you try self-care. Exercising and maintaining a healthy weight may keep problem veins from getting worse. Wearing elastic stockings and elevating your legs can help improve blood flow. Taking breaks when you sit or stand helps, too.         The top of the elastic stocking should be below the bend in your knee for a proper fit.      Exercising  Exercising is good for your veins because it improves blood flow. Walking, cycling, or swimming are great exercises for vein health. But be sure to check with your healthcare provider before starting any exercise program. Also, keep these hints in mind:    When exercising, start out slowly and try to build up to 30 minutes on most days.    Elevate your legs above heart level after exercise to keep blood from pooling in veins.    Maintaining a healthy weight  Being overweight puts extra pressure on your veins. To maintain a healthy weight, try these tips:    Choose lean meats, fish, and skinless chicken.    Use low-fat dairy products.    Eat foods high in fiber, such as whole grains, fruits, and vegetables.    Cut down on sugar, salt, and saturated and  trans fats.    Exercise regularly.    Wearing elastic stockings  Elastic stockings gently squeeze veins so blood flows upward. If you need elastic stockings, your healthcare provider can prescribe them for you. Follow your healthcare provider's advice about how and when to wear them. Elastic stockings come in several different levels of pressure. Ask your healthcare provider which level of pressure would benefit you the most.     Elevating your legs  Raising your legs above heart level will help relieve swelling and keep blood from pooling in veins. Try to elevate your legs for 15 to 20 minutes at the end of the day, and whenever you're relaxing. To make sure your legs are raised above heart level, prop them up on cushions or large pillows.    When sitting and standing  To keep blood moving when you have to sit or stand for long periods, try these tips:    At work, take walking breaks instead of coffee breaks. Walk during your lunch hour. Or try flexing your feet up and down 10 times each hour.    When standing, raise yourself up and down on your toes, or rock back and forth on your heels.    Date Last Reviewed: 5/1/2016 2000-2017 [employer]. [address] [place] [address]. All rights reserved. This information is not intended as a substitute for professional medical care. Always follow your healthcare professional's instructions.    [facility]  [address]  [place] [address]    [place] [phone]  [phone]  [place] [phone]  [email or web address]    [facility]   [address]             [address]                 [address]     [place] [address]      [place] [address]         [place] [address]  [phone]  [phone]            [phone]                Fax:  [phone]  [email or web address]                                              [facility]  [address] [place] [address]  [phone]  [phone]  [email or web address]    [address] [place]za  [phone]  [email or web address]    [facility]   [phone]    [place] [address] [place] [address]  [phone]

## 2021-11-03 NOTE — PROGRESS NOTES
Follow Up: Varicose Veins/ Venous Insufficiency    Corazon Hester is a 81 year old  female here for followup. She has worn stockings now for 3         months. I saw them previously in August / 03 / 2021.  Continued progression of disease and symptoms and issues; reviewed ultrasound results. Patient has ongoing symptoms with pain and swelling needing intervention with pain meds secondary to interfering with daily activities and work. This now inhibits daily chores and activities.     Allergies:Calcium and Tetracycline    Past Medical History:   Diagnosis Date     Duodenal ulcer, unspecified as acute or chronic, without hemorrhage, perforation, or obstruction 1950s?     GERD (gastroesophageal reflux disease)     Described as belching     HTN (hypertension) 2009     Tobacco abuse        Past Surgical History:   Procedure Laterality Date     HC DILATION/CURETTAGE DIAG/THER NON OB      x3-4     HC REMOVAL OF TONSILS,<13 Y/O       STRIP VEIN  9-11    left leg       CURRENT MEDS:  Current Outpatient Medications   Medication     amLODIPine (NORVASC) 5 MG tablet     aspirin (ASA) 81 MG EC tablet     BENADRYL 25 MG OR CAPS     CENTRUM SILVER OR TABS     FISH OIL 1000 MG OR CAPS     Ibuprofen (ADVIL PO)     Lactobacillus (PROBIOTIC ACIDOPHILUS PO)     METAMUCIL OR     multivitamin (OCUVITE) TABS     mupirocin (BACTROBAN) 2 % external ointment     VITAMIN C 500 MG OR TABS     Current Facility-Administered Medications   Medication     lidocaine 1% with EPINEPHrine 1:100,000 112 mL in sodium chloride 0.9 % 1,000 mL (TUMESCENT)       Family History   Problem Relation Age of Onset     Cancer Father         Bladder     Hypertension Sister         reports that she has been smoking. She has a 12.50 pack-year smoking history. She has never used smokeless tobacco. She reports current alcohol use of about 7.0 standard drinks of alcohol per week. She reports that she does not use drugs.    Review of Systems:  Negative except   Progression of issues while conservative therapy and treatment.   Patient has symptomatic veins and changes of bilateral legs. These have progressed to the point of causing symptoms on a daily basis. This causes issues with daily activities and chores such as washing dishes, vacuuming, outdoor upkeep and standing for long lengths of time. She has worn compression now for greater than 3 months, worked on an exercise and weight loss program and continues to have symptoms.     OBJECTIVE:  Vitals:    11/02/21 0944   BP: 122/80   Pulse: 72   Temp: 98.1  F (36.7  C)     There is no height or weight on file to calculate BMI.    Status: doing well    EXAM:  GENERAL: This is a well-developed 81 year old female who appears her stated age  HEAD: normocephalic  HEENT: Pupils equal and reactive bilaterally  CARDIAC: RRR without murmur  CHEST/LUNG:  Clear to auscultation  ABDOMEN: Soft, nontender, nondistended, no masses    NEUROLOGIC: Focally intact, nonfocal  VASCULAR: Pulses intact, symmetrical upper and lower extremities. Varicose veins, Spider veins and Chronic venous stasis changes, bilateral                    Imaging:    Study Result    Narrative & Impression   BILATERAL Venous Insufficiency Ultrasound (Date: 11/02/21)    BILATERAL Lower Extremity          Indication:  SURVEILLANCE BILATERAL LEG VARICOSE VEINS, PAIN AND SWELLING. HX: LEFT GSV ABLATED.      Previous: NONE     Patient History: Swelling and Vein Ablation     Presenting Symptoms:  Swelling, Varicose Veins and Pain     Technique:   Supine and Upright Ultrasound of the Deep and Superficial Veins with Valsalva and Compression Augmentation Maneuvers. Duplex Imaging is performed utilizing gray-scale, Two-dimensional images, color-flow imaging, Doppler waveform analysis, and Spectral doppler imaging done with provacative maneuvers.      Incompetency Criteria:  Deep vein reflux reported when greater than 1000 msec flow reversal. Superficial vein reflux reported when  equal to or greater than 600 msec flow reversal.  vein reflux reported as greater than 350 msec flow reversal.      Right  Leg Deep Veins    CFV SFJ PFV PROX FV   PROX FV MID FV DIST POP V. PERON.   V. PTV'S   Compressibility  (FC,PC,NC) FC FC FC FC FC FC FC FC FC   Reflux -   - - - - -   -         Right Leg Saphenous Veins  Location SFJ PROX THIGH KNEE MID CALF SPJ PROX CALF MID CALF   RT GSV (mm) 5.5 5.6 5.8 2.6         Reflux + + + +         RT SSV (mm)         5.7 1.2 2.0   Reflux         - - -         Left Leg Deep Veins    CFV SFJ PFV PROX SFV PROX SFV MID SFV DIST POP V. PERON. V. PTV'S   Compressibility  (FC,PC,NC) FC FC FC FC FC FC FC FC FC   Reflux -   - - - - -   -         Lt Leg Saphenous Veins   Location SFJ PROX THIGH KNEE MID CALF SPJ PROX CALF MID CALF   LT GSV (mm) RFA RFA RFA  4.1         Reflux N/A N/A N/A -         LT SSV (mm)         1.4 1.3 1.2   Reflux         - - -      Location SFJ PROX THIGH MID THIGH   LT AASV (mm) 5.8 5.5 4.8   Reflux + - +         Impression:       Right Deep Vein Findings: Patent deep venous system with no reflux     Left Deep Vein Findings: Patent deep venous system with no reflux     Superficial Vein Findings:      Right Greater Saphenous vein: Patent Greater Saphenous vein with Reflux noted Throughout with a Maximum diameter of 6 mm.     Right Small Saphenous Vein: Patent Small Saphenous Vein without evidence of reflux.     Left Greater Saphenous Vein: Not visualized from the saphenofemoral junction to the knee.  The vein is competent at the mid calf..     Left Small Saphenous Vein: Patent Small Saphenous Vein without evidence of reflux.     Perforating and Accessory Veins: Incompetent left anterior accessory saphenous vein at the saphenofemoral junction and mid thigh with maximal diameter of 6 mm.     Reference:     Compressibility: FC= Fully compressible, PC= Partially compressible, NC= Non-compressible, NV= Not Visualized     Reflux: (+) Incompetent  (-)  Competent, (NV) = Not Visualized     Interpretation criteria:          Duration of Retrograde flow (milliseconds)  Category Deep Veins Superficial Veins  veins   Competent < 1000ms < 600ms < 350ms   Incompetent > 1000ms > 600ms > 350ms            Assessment/Plan:    She has  incompetency and insuffiencey of the [place]  Greater  saphenous vein and left anterior accessory saphenous vein.  Right measures 5.5 at the junction, left measures 5.8. Deep systems are intact. No DVTs. Great candidate for endovenous  closure. We spent 20 minutes today and discussed the procedure. The risks of anesthesia, infection, bleeding, clotting, DVTs, numbess at the insertion site dermatome and  the process and procedure were discussed. Answered all questions today. Will submit this to Corazon Hester's insurance if needed for pre approval.Will set this up when approved. Discussed need to have a  and procedure woul take around 30 minutes with total time 2-3 hours. Also understands a small screening ultrasound 2-3 days out to rule out clot formation at the closed vessel.    DIAGNOSIS: Venous insufficiency of the  right greater saphenous vein and  left anterior accessory vein      PROCEDURE: Endovenous closure of the right greater saphenous vein and  left anterior accessory vein    Lawson Tate MD  [facility] Surgery Dept.

## 2022-01-26 ENCOUNTER — TELEPHONE (OUTPATIENT)
Dept: VASCULAR SURGERY | Facility: CLINIC | Age: 82
End: 2022-01-26
Payer: COMMERCIAL

## 2022-01-26 NOTE — TELEPHONE ENCOUNTER
Patient called to state she would like to get the procedure scheduled with Dr Tate this spring.  She said she has new insurance information, which has been updated.  (now [employer] instead of [employer])  She is aware that we will need to send for a new PA and is waiting for someone to call her back to schedule.  She can be reached at [phone] for questions/updates.

## 2022-04-19 ENCOUNTER — TELEPHONE (OUTPATIENT)
Dept: VASCULAR SURGERY | Facility: CLINIC | Age: 82
End: 2022-04-19
Payer: COMMERCIAL

## 2022-04-29 NOTE — TELEPHONE ENCOUNTER
Patient called and did not remember having a discussion last week with Dusty.  She would like a call back to go over the do/don's and any preps of the procedure with Dr. Tate next week.  [phone]    
Spoke with patient regarding vein ablation procedure next week. Advised to bring a . Explained it is ok to eat and drink prior to procedure with exception of blood thinner.ASA ok Verified patient's insurance.[employer]part[employer] only no longer has humana We will supply patient with a thigh high compression sock. We carry from size medium to extra large. If patient doesn't fit into them they will need to provide their own. Questions answered. Patient will call if any further questions.    Patient will call Which day to keep as right GSV and left AASV can be done on the same day.  
85 y/o F PMHx HLD, HTN, A-fib on Eliquis, hypothyroidism, CHF, AAA s/p EVAR (2017), left renal artery coil embolization, right renal artery stent and SMA stent (2017), with recent C. diff diagnosis started on PO Vancomycin by PCP on Monday, has been compliant, presents to ED with bright red blood per rectum x1 day.   Patient is accompanied by daughter at bedside who states that the patients toilet was filled with bright red blood on 4 separate instances of diarrhea yesterday. Patient was having non-bloody diarrhea for 3 weeks prior to being started on Vancomycin.    In ER, Vitals were stable. Labs were remarkable for Normocytic anemia, Hyponatremia, hypokalemia, elevated Pro BNP. US LE was unremarkable. Echo showed Pulmonary HTN, Severe TR. Patient has been admitted for Lower GI bleed. GI was consulted but patient refused Colonoscopy. ID  recommended to treat with Vancomycin for 14 days. Patient is having formed stools now. CODY has resolved. Patient will be discharged home to follow up outpatient with PMD and GI    Instructions:   Vancomycin 1 cap every 6 hours x 4 days  Ferrous Sulfate 1 tab twice daily  Ascorbic acid 1 tab twice daily   F/U PMD in 1 week to check BMP and then 1 month to check CBC   F/U GI outpatient (Call GI clinic for appointment )

## 2022-05-03 ENCOUNTER — OFFICE VISIT (OUTPATIENT)
Dept: VASCULAR ULTRASOUND | Facility: CLINIC | Age: 82
End: 2022-05-03
Attending: SPECIALIST
Payer: COMMERCIAL

## 2022-05-03 VITALS
DIASTOLIC BLOOD PRESSURE: 68 MMHG | RESPIRATION RATE: 18 BRPM | SYSTOLIC BLOOD PRESSURE: 112 MMHG | TEMPERATURE: 98 F | HEART RATE: 76 BPM

## 2022-05-03 DIAGNOSIS — I83.893 VARICOSE VEINS OF BILATERAL LOWER EXTREMITIES WITH OTHER COMPLICATIONS: ICD-10-CM

## 2022-05-03 PROCEDURE — C1886 CATHETER, ABLATION: HCPCS

## 2022-05-03 PROCEDURE — 258N000003 HC RX IP 258 OP 636: Performed by: SPECIALIST

## 2022-05-03 PROCEDURE — 250N000009 HC RX 250: Performed by: SPECIALIST

## 2022-05-03 PROCEDURE — 36475 ENDOVENOUS RF 1ST VEIN: CPT | Mod: 50 | Performed by: SPECIALIST

## 2022-05-03 RX ADMIN — LIDOCAINE HYDROCHLORIDE,EPINEPHRINE BITARTRATE: 10; .01 INJECTION, SOLUTION INFILTRATION; PERINEURAL at 14:01

## 2022-05-03 RX ADMIN — LIDOCAINE HYDROCHLORIDE 10 ML: 10 INJECTION, SOLUTION EPIDURAL; INFILTRATION; INTRACAUDAL; PERINEURAL at 13:50

## 2022-05-03 ASSESSMENT — PAIN SCALES - GENERAL: PAINLEVEL: NO PAIN (0)

## 2022-05-03 NOTE — PATIENT INSTRUCTIONS
Home Care Instructions Following Radiofrequency Closure of the Greater Saphenous Vein (VNUS)    Dressing    Wear your compression for 48 hours continuously until your ultrasound after the procedure.  You can remove your stocking to shower in 24 hours but then reapply after.    Wear the stocking for two weeks after the procedure while you are up.    Activity    The day of the procedure make sure to walk around the house for a few minutes each hour until bedtime.    The day after the procedure you should advance activity as tolerated.  NO strenuous activities though for 2 weeks.  In general avoid prolonged standing in place and sitting with your legs down.  Both of these activities will cause blood to pool in your legs resulting in more swelling and discomfort.    Do not drive or operate heavy machinery for 24 hours after the procedure.    Do not take baths or use hot tubs or swimming pools until your incision site is healed.    Do not fly for the Next 3 weeks.    Post Procedure Ultrasound    You will need an ultrasound within 2-3 days following the closure procedure.  Please schedule an ultrasound if you have not already done so.    Post Procedure Signs and Symptoms    There can be a mild amount of bruising and numbness after this procedure.  This will typically take a few weeks to fade.    You may feel pain or tenderness in your inner thigh.  Mild pain medication such as Tylenol or Ibuprofen maybe helpful.    It is normal to have fluid leaking from the injection areas the day of the procedure and it may be blood tinged.    Call if you have    Fever greater than 100.8 degrees F.    Uncontrolled bleeding from the incision site.    Pain that is not relieved by rest or pain medication.    Shortness of breath    Follow -up    Please plan to see your surgeon in the office two weeks after your procedure    Call us to schedule this appointment if one has not already been made    You will receive a phone call from our  staff within 48 hours of your procedure.  Please have these instruction near by so that any questions can be addressed at that time.  If you have any concerns before that time, please do not hesitate to call us at [phone] and ask to speak to a nurse.  We want you to have a smooth recovery.    For emergencies after 4:30pm Monday - Friday, holidays and weekends:  For Dr Lawson Tate call [facility] at [phone]  [facility] [phone]

## 2022-05-03 NOTE — PROGRESS NOTES
VNUS Radiofrequency Ablation    Pre-Procedure:  Admit  [x]Consent for Radio Frequency Ablation of the   [x]Right Saphenous Vein and/or branches  [x]Left Saphenous Vein and/or branches  Vitals  [x]Vital signs per routine    Nursing Orders  [x]Vein Mapping of  [x]R extremity  [x]L extremity  [x]Sterile Prep to extremity  [x]Obtain Tumescent Solution 500mL (NS 1000mL, 1% Lidocaine w Epi 56mL, 8.4% Sodium Bicarbonate 5.6mL)    Medications  []Diazepam (Valium) 5mg PO, May Repeat x 1 for anxiety, relaxtion.    Post-Procedure:   Admission  [x]Post Procedure care - call physician for status update  []Admit to inpatient - Please document reason for admission in chart    Interventions require inpatient services    High risk of deterioration due to comorbidities    High risk of deterioration due to nature of admitting diagnosis  Location:    Vitals  [x]Vital signs per routine    Nursing Orders  [x]Thigh High Compression Stocking 20-30mm or 30-40mm to  [x]R extremity  [x]L extremity  [x]Check insertion site for bleeding or hematoma.  If bleeding or hematoma occurs, apply pressure and notify MD    Discharge  [x]Discharge home if no complications arise.  [x]Give post radiofrequency ablation discharge instructions to patient.  [x]Follow up venous ultrasound 72-96 hours after procedure.  [x]Follow up appointment with MD at 2 weeks.  [x]Contact MD for further questions

## 2022-05-04 ENCOUNTER — TELEPHONE (OUTPATIENT)
Dept: VASCULAR SURGERY | Facility: CLINIC | Age: 82
End: 2022-05-04
Payer: COMMERCIAL

## 2022-05-04 DIAGNOSIS — I10 ESSENTIAL HYPERTENSION: Chronic | ICD-10-CM

## 2022-05-04 NOTE — TELEPHONE ENCOUNTER
Spoke with patient.  Ok to remove gauze today, leave steri strips on until they fall of on their own.

## 2022-05-04 NOTE — TELEPHONE ENCOUNTER
Patient called asking for instructions on what to do with the bandages she has on after her procedure with Dr. Tate yesterday.      [phone]

## 2022-05-05 ENCOUNTER — ANCILLARY PROCEDURE (OUTPATIENT)
Dept: VASCULAR ULTRASOUND | Facility: CLINIC | Age: 82
End: 2022-05-05
Attending: SPECIALIST
Payer: COMMERCIAL

## 2022-05-05 DIAGNOSIS — I83.893 VARICOSE VEINS OF BILATERAL LOWER EXTREMITIES WITH OTHER COMPLICATIONS: ICD-10-CM

## 2022-05-05 PROCEDURE — 93971 EXTREMITY STUDY: CPT

## 2022-05-05 PROCEDURE — 93971 EXTREMITY STUDY: CPT | Mod: 26 | Performed by: INTERNAL MEDICINE

## 2022-05-06 RX ORDER — AMLODIPINE BESYLATE 5 MG/1
5 TABLET ORAL DAILY
Qty: 30 TABLET | Refills: 0 | Status: SHIPPED | OUTPATIENT
Start: 2022-05-06 | End: 2022-05-11

## 2022-05-06 NOTE — TELEPHONE ENCOUNTER
Pt arrived at  requesting refill to be approved as soon as possible, pt will be out of meds by next appt on 5/11/2022. Forwarding to MD for approval.

## 2022-05-06 NOTE — TELEPHONE ENCOUNTER
I have seen pt yet. Reviewed the chart last Rx of amlodipine was 8/2019 with one year supply by Dr. Hector Cabezas. I am not sure which doctor continue Rx it to her. I will refill for one month until I see her at 5/11/2022    Please inform pt that I refilled for one month and will see her at 5/11.     Blanca Gallagher MD on 5/6/2022 at 1:31 PM

## 2022-05-06 NOTE — TELEPHONE ENCOUNTER
Left VM for pt informing of 1mo med refill. Per Dr Gallagher pt will need to follow up at [facility] on 5/11/2022 to continue receiving refills for medication.

## 2022-05-11 ENCOUNTER — OFFICE VISIT (OUTPATIENT)
Dept: FAMILY MEDICINE | Facility: CLINIC | Age: 82
End: 2022-05-11
Payer: COMMERCIAL

## 2022-05-11 VITALS
RESPIRATION RATE: 16 BRPM | BODY MASS INDEX: 21.14 KG/M2 | HEART RATE: 72 BPM | SYSTOLIC BLOOD PRESSURE: 120 MMHG | HEIGHT: 61 IN | DIASTOLIC BLOOD PRESSURE: 80 MMHG | TEMPERATURE: 98.5 F | WEIGHT: 112 LBS

## 2022-05-11 DIAGNOSIS — I10 ESSENTIAL HYPERTENSION: Primary | Chronic | ICD-10-CM

## 2022-05-11 DIAGNOSIS — L29.9 ITCHY SKIN: ICD-10-CM

## 2022-05-11 LAB
ALBUMIN SERPL-MCNC: 3.4 G/DL (ref 3.5–5)
ALP SERPL-CCNC: 112 U/L (ref 45–120)
ALT SERPL W P-5'-P-CCNC: 13 U/L (ref 0–45)
ANION GAP SERPL CALCULATED.3IONS-SCNC: 8 MMOL/L (ref 5–18)
AST SERPL W P-5'-P-CCNC: 18 U/L (ref 0–40)
BILIRUB SERPL-MCNC: 1.2 MG/DL (ref 0–1)
BUN SERPL-MCNC: 10 MG/DL (ref 8–28)
CALCIUM SERPL-MCNC: 9.2 MG/DL (ref 8.5–10.5)
CHLORIDE BLD-SCNC: 103 MMOL/L (ref 98–107)
CHOLEST SERPL-MCNC: 247 MG/DL
CO2 SERPL-SCNC: 27 MMOL/L (ref 22–31)
CREAT SERPL-MCNC: 0.7 MG/DL (ref 0.6–1.1)
FASTING STATUS PATIENT QL REPORTED: YES
GFR SERPL CREATININE-BSD FRML MDRD: 86 ML/MIN/1.73M2
GLUCOSE BLD-MCNC: 88 MG/DL (ref 70–125)
HDLC SERPL-MCNC: 117 MG/DL
LDLC SERPL CALC-MCNC: 121 MG/DL
POTASSIUM BLD-SCNC: 4.3 MMOL/L (ref 3.5–5)
PROT SERPL-MCNC: 6.4 G/DL (ref 6–8)
SODIUM SERPL-SCNC: 138 MMOL/L (ref 136–145)
TRIGL SERPL-MCNC: 44 MG/DL

## 2022-05-11 PROCEDURE — 99213 OFFICE O/P EST LOW 20 MIN: CPT | Mod: 25 | Performed by: FAMILY MEDICINE

## 2022-05-11 PROCEDURE — 0054A COVID-19,PF,PFIZER (12+ YRS): CPT | Performed by: FAMILY MEDICINE

## 2022-05-11 PROCEDURE — 80053 COMPREHEN METABOLIC PANEL: CPT | Performed by: FAMILY MEDICINE

## 2022-05-11 PROCEDURE — 36415 COLL VENOUS BLD VENIPUNCTURE: CPT | Performed by: FAMILY MEDICINE

## 2022-05-11 PROCEDURE — 91305 COVID-19,PF,PFIZER (12+ YRS): CPT | Performed by: FAMILY MEDICINE

## 2022-05-11 PROCEDURE — 80061 LIPID PANEL: CPT | Performed by: FAMILY MEDICINE

## 2022-05-11 RX ORDER — LATANOPROST 50 UG/ML
1 SOLUTION/ DROPS OPHTHALMIC AT BEDTIME
COMMUNITY
Start: 2022-04-21

## 2022-05-11 RX ORDER — AMLODIPINE BESYLATE 5 MG/1
5 TABLET ORAL DAILY
Qty: 90 TABLET | Refills: 3 | Status: SHIPPED | OUTPATIENT
Start: 2022-05-11 | End: 2023-05-12

## 2022-05-11 ASSESSMENT — PAIN SCALES - GENERAL: PAINLEVEL: NO PAIN (0)

## 2022-05-11 NOTE — LETTER
May 12, 2022      Corazon Hester  [address] [address]        Dear ,    We are writing to inform you of your test results.    I have reviewed all lab results which are normal or stable. Good job.     Normal liver and kidney function. Normal blood glucose. Normal electrolytes.     Elevated total cholesterol with high HDL that is good cholesterol. Continue current exercise and health diet.       Blanca Gallagher MD on 5/12/2022 at 10:10 AM       Resulted Orders   Lipid panel   Result Value Ref Range    Cholesterol 247 (H) <=199 mg/dL    Triglycerides 44 <=149 mg/dL    Direct Measure  >=50 mg/dL      Comment:      HDL Cholesterol Reference Range:     0-2 years:   No reference ranges established for patients under 2 years old  at [facility] for lipid analytes.    2-8 years:  Greater than 45 mg/dL     18 years and older:   Female: Greater than or equal to 50 mg/dL   Male:   Greater than or equal to 40 mg/dL    LDL Cholesterol Calculated 121 <=129 mg/dL    Patient Fasting > 8hrs? Yes    Comprehensive metabolic panel (BMP + Alb, Alk Phos, ALT, AST, Total. Bili, TP)   Result Value Ref Range    Sodium 138 136 - 145 mmol/L    Potassium 4.3 3.5 - 5.0 mmol/L    Chloride 103 98 - 107 mmol/L    Carbon Dioxide (CO2) 27 22 - 31 mmol/L    Anion Gap 8 5 - 18 mmol/L    Urea Nitrogen 10 8 - 28 mg/dL    Creatinine 0.70 0.60 - 1.10 mg/dL    Calcium 9.2 8.5 - 10.5 mg/dL    Glucose 88 70 - 125 mg/dL    Alkaline Phosphatase 112 45 - 120 U/L    AST 18 0 - 40 U/L    ALT 13 0 - 45 U/L    Protein Total 6.4 6.0 - 8.0 g/dL    Albumin 3.4 (L) 3.5 - 5.0 g/dL    Bilirubin Total 1.2 (H) 0.0 - 1.0 mg/dL    GFR Estimate 86 >60 mL/min/1.73m2      Comment:      Effective December 21, 2021 eGFRcr in adults is calculated using the 2021 CKD-EPI creatinine equation which includes age and gender (Alvarado elena al., NEJM, DOI: 10.1056/XMFUum2878625)       If you have any questions or concerns, please call the  clinic at the number listed above.       Sincerely,      Blanca Gallagher MD

## 2022-05-11 NOTE — PROGRESS NOTES
"  Assessment & Plan     (I10) Essential hypertension  (primary encounter diagnosis)  Comment: bp well controlled at office. she takes medication and no side effect. She dose check bp at pharmacy occasionally.   Plan: amLODIPine (NORVASC) 5 MG tablet, Lipid panel,         Comprehensive metabolic panel (BMP + Alb, Alk         Phos, ALT, AST, Total. Bili, TP)            (L29.9) Itchy skin  Comment: itchy skin since 4/2022. No rash and sick contact. Benadryl helped. Exam on skin is not remarkable. No rash and suspensions lesion. Likely due to dry skin at cold and dry air.    Plan: advise lotion many times a day.     :[id number]}     Tobacco Cessation:   reports that she has been smoking. She has a 12.50 pack-year smoking history. She has never used smokeless tobacco.  Advise to quit smoke and she is not ready.         Return in about 1 month (around 6/11/2022) for Routine preventive.    Blanca Gallagher MD  [facility]    Baltazar Chandra is a 81 year old who presents for the following health issues     HPI     HTN: takes medication as instructed and no side effect.   Itching skin on back since she came back to [place] at early April from [place]  She tried benadryl and helped.       Review of Systems   Constitutional, HEENT, cardiovascular, pulmonary, gi and gu systems are negative, except as otherwise noted.      Objective    /80 (BP Location: Right arm, Patient Position: Sitting, Cuff Size: Adult Small)   Pulse 72   Temp 98.5  F (36.9  C) (Oral)   Resp 16   Ht 1.537 m (5' 0.5\")   Wt 50.8 kg (112 lb)   BMI 21.51 kg/m    Body mass index is 21.51 kg/m .  Physical Exam   GENERAL: healthy, alert and no distress  NECK: no adenopathy, no asymmetry, masses, or scars and thyroid normal to palpation  RESP: lungs clear to auscultation - no rales, rhonchi or wheezes  CV: regular rate and rhythm, normal S1 S2, no S3 or S4, no murmur, click or rub, no peripheral edema and peripheral pulses " strong  ABDOMEN: soft, nontender, no hepatosplenomegaly, no masses and bowel sounds normal  MS: no gross musculoskeletal defects noted, no edema   skin: no suspension lesion. No rash. Mild dry on back

## 2022-05-31 ENCOUNTER — OFFICE VISIT (OUTPATIENT)
Dept: VASCULAR SURGERY | Facility: CLINIC | Age: 82
End: 2022-05-31
Attending: SPECIALIST
Payer: COMMERCIAL

## 2022-05-31 VITALS — RESPIRATION RATE: 16 BRPM | SYSTOLIC BLOOD PRESSURE: 116 MMHG | DIASTOLIC BLOOD PRESSURE: 70 MMHG | HEART RATE: 84 BPM

## 2022-05-31 DIAGNOSIS — I83.893 VARICOSE VEINS OF BILATERAL LOWER EXTREMITIES WITH OTHER COMPLICATIONS: Primary | ICD-10-CM

## 2022-05-31 PROCEDURE — G0463 HOSPITAL OUTPT CLINIC VISIT: HCPCS

## 2022-05-31 PROCEDURE — 99212 OFFICE O/P EST SF 10 MIN: CPT | Performed by: SPECIALIST

## 2022-05-31 NOTE — PATIENT INSTRUCTIONS
"We would like to see you back in 4 months for a follow up. You should wear compression as much as you can. It is especially important to wear compression with long periods of     sitting/standing, long car rides or if you will be flying. Compression socks should get refilled every 4-6 months.If you do a lot of standing it is good to do calf raises to help keep the blood     pumping. If you sit a lot at work it is good to get up periodically to walk around. Elevation of the foot of your bed 4-6\" helps the blood return back to where it is needed. They do not need to be     worn at night while in bed. We can refill your compression prescription for 1 year otherwise your primary is able to refill them for you.    Call us with any problems or questions. [phone]    [facility]    [facility]  [address]  [place] [address]  Phone: [phone]    [facility]   [address] ([facility] [place] [address]  Phone: [phone]    [facility]  [address]   [place] [address]   Phone: [phone]    [facility]  [address]  [place] [address]  Phone: [phone]    [facility]   [address]  [place] [address]  Phone: [phone]    [facility]  [address]  [place] [address]  Phone: [phone]    [facility]  [address]  [place] [address]  Phone: [phone]    [place]   [address] [place] [address]   Phone: [phone]      [facility]   [address]             [address]                 [address]     [place] [address]      " [place] [address]         [place] [address]  Phone: [phone]      Phone: [phone]            Phone: [phone]  Fax: [phone]          Fax:[phone]                 Fax: [phone]                                     Дмитрий De Souza  [phone]  [email or web address]

## 2022-06-03 NOTE — PROGRESS NOTES
Post Procedure Note Endovenous Closure    S: Corazon Hester is a 81 year old female S/P Bilateral  leg endovenous closure ofBilateral lower ext. Two weeks out from procedure. Doing well, wore female  thigh high socks. Minimal discomfort. Some superficial phlibitis. Which is resolving.     O:   Vitals:    05/31/22 1032   BP: 116/70   Pulse: 84   Resp: 16       Status: doing well and having discomfort    General: no apparent distress  Legs look good no signs of infection, incisions healing nicely.                    Side:: Bilateral  VCSS  PAIN:: Absent: None  Varicose Veins:: Mild: Few scattered  Venous Edema:: Absent: None  Skin Pigmentation:: Absent: None  Inflamation:: Absent: None  Induration:: Absent: None  Number of active ulcers:: 0  Active ulcer duration:: None  Active ulcer diameter:: None  Compression Therapy:: Intermittent use of stockings  VCSS Score:: 2    Imaging:    PACS Images     Show images for US Venous Post Ablation Legs Bilateral         Study Result    Narrative & Impression   Bilateral Venous Ultrasound Status Post Radiofrequency Ablation (Date: 05/05/22)        Indication: Follow-up saphenous vein Radiofrequency ablation     Date of Procedure: Right 05/03/22   Left 05/03/22      Right CFV/SFJ Compression:  Fully Compressible     Left CFV/SFJ Compression:  Fully Compressible     Reference:   (FC)-Fully Compressible           (PC)-Partially Compressible   (NC) Non-Compressible     Location Right GSV Left AASV   Proximal Thigh NC NC   Mid Thigh NC NC   Distal Thigh NC FC   Knee NC     Proximal Calf NC     Mid Calf NC     Distal Calf FC        Impression: Status post radiofrequency ablation for the right great saphenous vein and the left anterior accessory saphenous vein, right GSV is noncompressible extending from the proximal thigh all the way to the mid calf, left anterior accessory saphenous vein is noncompressible from the proximal thigh to the mid thigh, no evidence of DVT         A/P:  S/p endovenous closure. For insuffiencey of veins     May switch to knee high support socks   Resume all activities   RTC 4 months   Call for any questions or concerns     Lawson Tate MD   [facility]

## 2022-10-13 ENCOUNTER — IMMUNIZATION (OUTPATIENT)
Dept: FAMILY MEDICINE | Facility: CLINIC | Age: 82
End: 2022-10-13
Payer: COMMERCIAL

## 2022-10-13 PROCEDURE — 0124A COVID-19,PF,PFIZER BOOSTER BIVALENT: CPT

## 2022-10-13 PROCEDURE — 91312 COVID-19,PF,PFIZER BOOSTER BIVALENT: CPT

## 2022-10-29 ENCOUNTER — HOSPITAL ENCOUNTER (EMERGENCY)
Facility: CLINIC | Age: 82
Discharge: HOME | End: 2022-10-29
Attending: EMERGENCY MEDICINE | Admitting: EMERGENCY MEDICINE
Payer: COMMERCIAL

## 2022-10-29 VITALS
HEART RATE: 70 BPM | RESPIRATION RATE: 16 BRPM | SYSTOLIC BLOOD PRESSURE: 168 MMHG | TEMPERATURE: 97.2 F | WEIGHT: 110 LBS | BODY MASS INDEX: 21.6 KG/M2 | HEIGHT: 60 IN | DIASTOLIC BLOOD PRESSURE: 101 MMHG | OXYGEN SATURATION: 98 %

## 2022-10-29 DIAGNOSIS — M54.42 ACUTE LEFT-SIDED LOW BACK PAIN WITH LEFT-SIDED SCIATICA: ICD-10-CM

## 2022-10-29 PROCEDURE — 99284 EMERGENCY DEPT VISIT MOD MDM: CPT

## 2022-10-29 RX ORDER — CYCLOBENZAPRINE HCL 10 MG
10 TABLET ORAL 3 TIMES DAILY PRN
Qty: 20 TABLET | Refills: 0 | Status: SHIPPED | OUTPATIENT
Start: 2022-10-29 | End: 2022-10-31

## 2022-10-29 RX ORDER — GABAPENTIN 100 MG/1
100 CAPSULE ORAL 3 TIMES DAILY
Qty: 30 CAPSULE | Refills: 0 | Status: SHIPPED | OUTPATIENT
Start: 2022-10-29 | End: 2022-11-10

## 2022-10-29 ASSESSMENT — ENCOUNTER SYMPTOMS
WEAKNESS: 0
ARTHRALGIAS: 1
NUMBNESS: 0
MYALGIAS: 1

## 2022-10-29 NOTE — ED TRIAGE NOTES
Triage Assessment     Row Name 10/29/22 0742       Triage Assessment (Adult)    Airway WDL WDL       Respiratory WDL    Respiratory WDL WDL       Skin Circulation/Temperature WDL    Skin Circulation/Temperature WDL WDL       Cardiac WDL    Cardiac WDL WDL       Peripheral/Neurovascular WDL    Peripheral Neurovascular WDL WDL       Cognitive/Neuro/Behavioral WDL    Cognitive/Neuro/Behavioral WDL WDL

## 2022-10-29 NOTE — ED PROVIDER NOTES
EMERGENCY DEPARTMENT ENCOUNTER     NAME: Corazon Hester   AGE: 82 year old female   YOB: 1940   MRN: [id number]   EVALUATION DATE & TIME: No admission date for patient encounter.   PCP: Blanca Gallagher     Chief Complaint   Patient presents with     Eye Pain Left Eye     Leg Pain   :    FINAL IMPRESSION       1. Acute left-sided low back pain with left-sided sciatica           ED COURSE & MEDICAL DECISION MAKING      Pertinent Labs & Imaging studies reviewed. (See chart for details)   82 year old female  presents to the Emergency Department for evaluation of left-sided lower back pain radiating into her leg, worse at night. Initial Vitals Reviewed. Initial exam notable for generally well-appearing patient who is ambulatory without a limp, has some tenderness near the left SI joint and lumbosacral paraspinal musculature.  Strength and sensation intact.  No red flags for cauda equina syndrome or epidural abscess.  I suspect likely sciatica versus SI joint dysfunction and will treat with Flexeril to help her sleep, gabapentin, continuation of anti-inflammatories which she is already doing, and a spine referral.  She is comfortable with this plan at time of discharge.           At the conclusion of the encounter I discussed the results of all of the tests and the disposition. The questions were answered. The patient or family acknowledged understanding and was agreeable with the care plan.         Medical Decision Making    Supplemental history from: Family Member    External Record(s) Reviewed: Outpatient Record    Differential Diagnosis: See MDM charting for differential considered.     I performed an independent interpretation of the: N/A    Discussed with radiology regarding test interpretation: N/A    Discussion of management with another provider: N/A    The following testing was considered but ultimately not selected: MRI Imaging no red flags    I considered prescription management with: Symptomatic  Management    The patient's care impacted: None    Consideration of Admission/Observation: N/A - Patient admitted or discharged without consideration for admission    Care significantly affected by Social Determinants of Health including: N/A    MEDICATIONS GIVEN IN THE EMERGENCY:   Medications - No data to display   NEW PRESCRIPTIONS STARTED AT TODAY'S ER VISIT   New Prescriptions    CYCLOBENZAPRINE (FLEXERIL) 10 MG TABLET    Take 1 tablet (10 mg) by mouth 3 times daily as needed for muscle spasms    GABAPENTIN (NEURONTIN) 100 MG CAPSULE    Take 1 capsule (100 mg) by mouth 3 times daily     ================================================================   HISTORY OF PRESENT ILLNESS       Patient information was obtained from: Patient  Use of Intrepreter: N/A  Corazon Hester is a 82 year old female with history of hypertension and tobacco use who presents with back pain.    The patient is experiencing worsening lower left back pain radiating down to the knee. She reports trying Advil but it has not provided any significant relief. The patient reports that usually at the night the pain is worse and has not been able to sleep through the night due to the pain. She denies any other complaints.     ================================================================    REVIEW OF SYSTEMS       Review of Systems   Musculoskeletal: Positive for arthralgias and myalgias. Negative for gait problem.   Neurological: Negative for weakness and numbness.   All other systems reviewed and are negative.        PAST HISTORY     PAST MEDICAL HISTORY:   Past Medical History:   Diagnosis Date     Duodenal ulcer, unspecified as acute or chronic, without hemorrhage, perforation, or obstruction 1950s?     GERD (gastroesophageal reflux disease)     Described as belching     HTN (hypertension) 2009     Tobacco abuse       PAST SURGICAL HISTORY:   Past Surgical History:   Procedure Laterality Date     HC DILATION/CURETTAGE DIAG/THER NON OB       x3-4     HC REMOVAL OF TONSILS,<13 Y/O       STRIP VEIN  9-11    left leg      CURRENT MEDICATIONS:   cyclobenzaprine (FLEXERIL) 10 MG tablet  gabapentin (NEURONTIN) 100 MG capsule  amLODIPine (NORVASC) 5 MG tablet  aspirin (ASA) 81 MG EC tablet  BENADRYL 25 MG OR CAPS  CENTRUM SILVER OR TABS  FISH OIL 1000 MG OR CAPS  Ibuprofen (ADVIL PO)  Lactobacillus (PROBIOTIC ACIDOPHILUS PO)  latanoprost (XALATAN) 0.005 % ophthalmic solution  METAMUCIL OR  multivitamin (OCUVITE) TABS  mupirocin (BACTROBAN) 2 % external ointment  VITAMIN C 500 MG OR TABS      ALLERGIES:   Allergies   Allergen Reactions     Calcium GI Disturbance     Tetracycline Diarrhea      FAMILY HISTORY:   Family History   Problem Relation Age of Onset     Cancer Father         Bladder     Hypertension Sister       SOCIAL HISTORY:   Social History     Socioeconomic History     Marital status:      Number of children: 3   Occupational History     Employer: NONE    Tobacco Use     Smoking status: Every Day     Packs/day: 0.25     Years: 50.00     Pack years: 12.50     Types: Cigarettes     Smokeless tobacco: Never     Tobacco comments:     3-4 cigarettes per day   Substance and Sexual Activity     Alcohol use: Yes     Alcohol/week: 7.0 standard drinks     Comment: 7 drinks per week     Drug use: No     Sexual activity: Yes     Partners: Male        VITALS  Patient Vitals for the past 24 hrs:   BP Temp Temp src Pulse Resp SpO2 Height Weight   10/29/22 0739 (!) 168/101 97.2  F (36.2  C) Oral 70 16 98 % 1.524 m (5') 49.9 kg (110 lb)        ================================================================    PHYSICAL EXAM     VITAL SIGNS: BP (!) 168/101   Pulse 70   Temp 97.2  F (36.2  C) (Oral)   Resp 16   Ht 1.524 m (5')   Wt 49.9 kg (110 lb)   SpO2 98%   BMI 21.48 kg/m     Constitutional:  Awake, no acute distress   HENT:  Atraumatic, oropharynx without exudate or erythema, membranes moist  Lymph:  No adenopathy  Eyes: EOM intact, PERRL, no  injection  Neck: Supple  Respiratory:  Clear to auscultation bilaterally, no wheezes or crackles   Cardiovascular:  Regular rate and rhythm, single S1 and S2   GI:  Soft, nontender, nondistended, no rebound or guarding   Musculoskeletal:  Moves all extremities, no lower extremity edema, no deformities  Ambulatory without a limp. TTP in the left lower lumbosacral paraspinal musculature.  Skin:  Warm, dry  Neurologic:  Alert and oriented x3, no focal deficits noted       ================================================================  LAB       All pertinent labs reviewed and interpreted.   Labs Ordered and Resulted from Time of ED Arrival to Time of ED Departure - No data to display     ===============================================================  RADIOLOGY       Reviewed all pertinent imaging. Please see official radiology report.   No orders to display         ================================================================  EKG         I have independently reviewed and interpreted the EKG(s) documented above.     ================================================================  PROCEDURES         I, Mana Gaxiola, am serving as a scribe to document services personally performed by Dr. Miller based on my observation and the provider's statements to me. I, Nancie Miller MD attest that Mana Gaxiola is acting in a scribe capacity, has observed my performance of the services and has documented them in accordance with my direction.   Nancie Miller M.D.   Emergency Medicine   [facility] EMERGENCY ROOM  [facility] [id number] [phone]  Dept: [phone]        Nancie Miller MD  10/29/22 0831

## 2022-10-29 NOTE — ED TRIAGE NOTES
"Pt has been having left hip down to left thigh for a few months. Past few days pain has been \"excrutiating\". Hard to sleep. Tried Advil without relief.       "

## 2022-10-31 ENCOUNTER — OFFICE VISIT (OUTPATIENT)
Dept: PHYSICAL MEDICINE AND REHAB | Facility: CLINIC | Age: 82
End: 2022-10-31
Payer: COMMERCIAL

## 2022-10-31 VITALS
HEART RATE: 79 BPM | BODY MASS INDEX: 22.26 KG/M2 | DIASTOLIC BLOOD PRESSURE: 74 MMHG | SYSTOLIC BLOOD PRESSURE: 124 MMHG | HEIGHT: 60 IN | WEIGHT: 113.4 LBS

## 2022-10-31 DIAGNOSIS — M54.16 LUMBAR RADICULITIS: ICD-10-CM

## 2022-10-31 DIAGNOSIS — M51.26 LUMBAR DISC HERNIATION: Primary | ICD-10-CM

## 2022-10-31 PROCEDURE — 99204 OFFICE O/P NEW MOD 45 MIN: CPT | Performed by: PHYSICIAN ASSISTANT

## 2022-10-31 RX ORDER — HYDROCODONE BITARTRATE AND ACETAMINOPHEN 5; 325 MG/1; MG/1
1 TABLET ORAL 3 TIMES DAILY PRN
Qty: 9 TABLET | Refills: 0 | Status: SHIPPED | OUTPATIENT
Start: 2022-10-31 | End: 2022-11-03

## 2022-10-31 RX ORDER — GABAPENTIN 100 MG/1
CAPSULE ORAL
Qty: 270 CAPSULE | Refills: 0 | Status: SHIPPED | OUTPATIENT
Start: 2022-10-31 | End: 2023-05-12

## 2022-10-31 RX ORDER — METHYLPREDNISOLONE 4 MG
TABLET, DOSE PACK ORAL
Qty: 21 TABLET | Refills: 0 | Status: SHIPPED | OUTPATIENT
Start: 2022-10-31 | End: 2022-11-10

## 2022-10-31 RX ORDER — CYCLOBENZAPRINE HCL 10 MG
10 TABLET ORAL 3 TIMES DAILY PRN
Qty: 20 TABLET | Refills: 0 | Status: SHIPPED | OUTPATIENT
Start: 2022-10-31 | End: 2023-05-12

## 2022-10-31 ASSESSMENT — PAIN SCALES - GENERAL: PAINLEVEL: EXTREME PAIN (8)

## 2022-10-31 NOTE — LETTER
10/31/2022         RE: Corazon Hester  [address] [facility] [place] [address]        Dear Colleague,    Thank you for referring your patient, Corazon Hester, to the [facility]. Please see a copy of my visit note below.    ASSESSMENT: Corazon Hester is a 82 year old female with past medical history significant for GERD, history of duodenal ulcer hypertension, tobacco abuse who presents today for new patient evaluation of severe pain radiating from the left buttock into the left lower extremity.  Pain began several months ago but became severe last week.  Patient has not had any imaging of the low back or pelvis/hip.  Symptoms are most consistent with left L4 radiculopathy.  Pain follows an primarily in an L4 pattern.  She had an absent left patellar reflex.  She had slight weakness in the left ankle dorsiflexors.      PLAN:  A shared decision making model was used.  The patient's values and choices were respected.  The following represents what was discussed and decided upon by the physician assistant and the patient.      1.  DIAGNOSTIC TESTS: I ordered an MRI lumbar spine.  Patient has an abnormal neurologic exam.  She is having severe pain.  She has been to the emergency department and called EMS to her home because of the severity of the pain.    2.  PHYSICAL THERAPY: I entered a referral to physical therapy.    3.  MEDICATIONS:  - I prescribed a Medrol Dosepak.  Patient will stop Advil while she is taking the Medrol Dosepak.  She may resume Advil after she completes the Medrol Dosepak.  - I refilled the patient's cyclobenzaprine 10 mg 3 times daily as needed.  - Patient is currently taking gabapentin 100 mg 3 times daily.  I provided a new prescription and the dosage titration chart so that They can increase her dose up to 300 mg 3 times daily.  We can potentially titrate her dose higher, depending on her response.  - I prescribed hydrocodone/acetaminophen  5/325 mg 1 tab every 8 hours as needed, 9 with no refills.  I checked the [place] prescription monitoring database.  Risks reviewed.  She is deemed to be low risk.  I will not provide this medication long-term.  I will not provide telephone refills.    4.  INTERVENTIONS: No interventions were ordered today.  Patient may benefit from interventional pain management if pain fails to improve, depending the results of her MRI.    5.  PATIENT EDUCATION: Patient is in agreement the above plan.  All questions were answered.    6.  FOLLOW-UP: My nurse will call the patient with the results of her MRI.  I will see the patient back in the clinic in about 10 days.  If she has questions or concerns in the meantime, she should not hesitate to call.      SUBJECTIVE:  Corazon Hester  Is a 82 year old female who presents today as a referral from the emergency department (October 29, 2022) for new patient evaluation of pain radiating from the left buttock down the left lower extremity.  Patient reports that she began to have some pain several months ago when lying on her left side.  She states she did not think much of it because when she repositioned the pain improved.  She did have some intermittent flareups of the pain but then 5 days ago pain became much more severe.  She woke up and could not walk or get out of bed on her own.  She had to crawl around the house.  She thought the pain may be related to her worn-out mattress so she tried sleeping on a firmer mattress for pain got progressively worse over the next several days.  On October 29, 2022 the pain was so severe that she went to the emergency department.  They evaluated her and discharged her with prescriptions for cyclobenzaprine and gabapentin.  Medications have helped somewhat although last night she called paramedics to her home because pain was so severe and she could not sleep.  Patient denies any prior history of back or hip problems.    Patient complains of  pain which begins in left buttock.  Pain wraps around the lateral thigh and extends into the shin just distal to the knee.  She describes the pain as a burning and excruciating pain.  Last night she also had pain radiating down the lateral shin to the ankle.  Pain does not reach the foot.  Pain is worse at night.  During the day it is difficult for her to identify triggers for the pain.  Sometimes it seems to depend on how she is sitting.  The pain can also come on strongly when she is walking.  Pain is alleviated somewhat with applying ice.  She denies any right-sided symptoms.  Denies any numbness, tingling, weakness down the legs.  Denies loss of bowel or bladder control.  Denies recent fevers, chills, sweats.    Patient has not had treatments for this.  She did has not had physical therapy.  She does not go to a chiropractor.  She has never had any spine injections or spine surgeries.  She is currently taking gabapentin 100 mg 3 times daily which helps somewhat.  She has found that taking ibuprofen along with the gabapentin provides more relief of her pain.  She has been taking cyclobenzaprine 10 mg as needed but plans to start taking it 3 times daily scheduled.  This is somewhat helpful.    Current Outpatient Medications   Medication     amLODIPine (NORVASC) 5 MG tablet     aspirin (ASA) 81 MG EC tablet     BENADRYL 25 MG OR CAPS     CENTRUM SILVER OR TABS     cyclobenzaprine (FLEXERIL) 10 MG tablet     FISH OIL 1000 MG OR CAPS     gabapentin (NEURONTIN) 100 MG capsule     Ibuprofen (ADVIL PO)     Lactobacillus (PROBIOTIC ACIDOPHILUS PO)     latanoprost (XALATAN) 0.005 % ophthalmic solution     METAMUCIL OR     multivitamin (OCUVITE) TABS     mupirocin (BACTROBAN) 2 % external ointment     VITAMIN C 500 MG OR TABS         Allergies   Allergen Reactions     Calcium GI Disturbance     Tetracycline Diarrhea       Past Medical History:   Diagnosis Date     Duodenal ulcer, unspecified as acute or chronic, without  hemorrhage, perforation, or obstruction 1950s?     GERD (gastroesophageal reflux disease)     Described as belching     HTN (hypertension) 2009     Tobacco abuse         Patient Active Problem List   Diagnosis     Hypertension; goal < 150/90     Advanced directives, counseling/discussion     Tobacco abuse     Bilateral carpal tunnel syndrome       Past Surgical History:   Procedure Laterality Date     HC DILATION/CURETTAGE DIAG/THER NON OB      x3-4     HC REMOVAL OF TONSILS,<11 Y/O       STRIP VEIN  9-11    left leg       Family History   Problem Relation Age of Onset     Cancer Father         Bladder     Hypertension Sister        Social history: Patient is .  She is retired.  She lives alone in a townhouse.  She smokes 4 cigarettes/day.  She has 1 drink per day before dinner.  Denies illicit drug use.       ROS:   Positive for joint pain, muscle pain, sciatica, falls.   Specifically negative for bowel/bladder dysfunction, fevers,chills, appetite changes, unexplained weight loss.   Otherwise 13 systems reviewed are negative.  Please see the patient's intake questionnaire from today for details.      OBJECTIVE:  PHYSICAL EXAMINATION:    CONSTITUTIONAL:  Vital signs as above.  No acute distress.  The patient is well nourished and well groomed.  PSYCHIATRIC:  The patient is awake, alert, oriented to person, place, time and answering questions appropriately with clear speech.    HEENT: Normocephalic, atraumatic.  Sclera clear.  Neck is supple.  SKIN:  Skin over the face, bilateral lower extremities, and posterior torso is clean, dry, intact without rashes.    GAIT:  Gait is mildly antalgic, favoring the left.  The patient is able to heel and toe walk without significant difficulty.    STANDING EXAMINATION: No significant tenderness palpation lower lumbar paraspinous muscles.  No significant tenderness palpation left SI joint, left sciatic notch, left greater trochanter.  Lumbar flexion within normal limits.   Lumbar extension mildly restricted with increased pain.  MUSCLE STRENGTH:  The patient has 4+/5 strength left ankle dorsiflexors, otherwise 5/5 strength for the bilateral hip flexors, knee flexors/extensors, right ankle dorsiflexors, bilateral ankle plantar flexors, great toe extensors.  NEUROLOGICAL: Reflexes are 2+ right and absent left patellar, 1+ bilateral Achilles.  Negative Babinski's bilaterally.  No ankle clonus bilaterally. Sensation to light touch is intact in the bilateral L4, L5, and S1 dermatomes.  VASCULAR:  2/4 dorsalis pedis pulses bilaterally.  Bilateral lower extremities are warm.  There is no pitting edema of the bilateral lower extremities.  ABDOMINAL:  Soft, non-distended, non-tender throughout all quadrants.  No pulsatile mass palpated in the left lower quadrant.  LYMPH NODES:  No palpable or tender inguinal lymph nodes.  MUSCULOSKELETAL: Left great toe is angled out and her left second toe overlaps her great toe.  Range of motion of both hips is full.  Patient does have some left buttock pain at end external rotation of the left hip.  Straight leg raise is negative bilaterally.            Again, thank you for allowing me to participate in the care of your patient.        Sincerely,        Miley Burroughs PA-C

## 2022-10-31 NOTE — PATIENT INSTRUCTIONS
Do not take Advil while you are taking the Medrol Dosepak you may resume Advil after you complete the Medrol Dosepak.    [facility] Scheduling    Please call [phone] to schedule your image(s) (select option#1). There are 2 different locations, see below. You can do walk-in visits for xray only images if you want.     [facility]  [address] [address]    [place] [id number] [facility] [id number]     An order for physicaltherapy has been provided today.  Someone will call you to schedule physical therapy or you can call [phone] to schedule physical therapy.  It will be very important for you to do your physical therapy exercises on aregular basis to decrease your pain and prevent future flares of pain.      Hydrocodone/acetaminophen was prescribed today. Please lock this medication up when you are not taking it. Do not share this medication with other people. Do not increase the dose without permission from your physician. Do not drink alcohol while you take this medication as this can lead to death. Do not take other pain medications without approval from your physician or this can also lead to death. If you need a refill of this medication, you must come in to clinic by appointment. Please call if you have any questions on how to take this medication.      Prescribed Gabapentin today, 100 mg tablets, to be titrated up to 3 tablets 3 times a day as tolerated for your nerve pain. Please follow Gabapentin dosing chart below.    Gabapentin 100mg Dosing Chart    DATE  MORNING AFTERNOON BEDTIME    Day 1 0 0 1    Day 2 0 0 1    Day 3 0 0 1    Day 4 1 0 1    Day 5 1 0 1    Day 6 1 0 1    Day 7 1 1 1    Day 8 1 1 1    Day 9 1 1 1    Day 10 1 1 2    Day 11 1 1 2    Day 12 1 1 2    Day 13 2 1 2    Day 14 2 1 2    Day 15 2 1 2    Day 16 2 2 2    Day 17 2 2 2    Day 18 2 2 2    Day 19 2 2 3    Day 20 2 2 3    Day 21 2 2 3    Day 22 3 2 3    Day 23 3 2 3    Day 24 3  2 3    Day 25 3 3 3    Day 26 3 3 3    Day 27 3 3 3     Continue medication, taking 3 capsules three times daily  Please call if you have any questions regarding how to take your medication

## 2022-10-31 NOTE — PROGRESS NOTES
ASSESSMENT: Corazon Hester is a 82 year old female with past medical history significant for GERD, history of duodenal ulcer hypertension, tobacco abuse who presents today for new patient evaluation of severe pain radiating from the left buttock into the left lower extremity.  Pain began several months ago but became severe last week.  Patient has not had any imaging of the low back or pelvis/hip.  Symptoms are most consistent with left L4 radiculopathy.  Pain follows an primarily in an L4 pattern.  She had an absent left patellar reflex.  She had slight weakness in the left ankle dorsiflexors.      PLAN:  A shared decision making model was used.  The patient's values and choices were respected.  The following represents what was discussed and decided upon by the physician assistant and the patient.      1.  DIAGNOSTIC TESTS: I ordered an MRI lumbar spine.  Patient has an abnormal neurologic exam.  She is having severe pain.  She has been to the emergency department and called EMS to her home because of the severity of the pain.    2.  PHYSICAL THERAPY: I entered a referral to physical therapy.    3.  MEDICATIONS:  - I prescribed a Medrol Dosepak.  Patient will stop Advil while she is taking the Medrol Dosepak.  She may resume Advil after she completes the Medrol Dosepak.  - I refilled the patient's cyclobenzaprine 10 mg 3 times daily as needed.  - Patient is currently taking gabapentin 100 mg 3 times daily.  I provided a new prescription and the dosage titration chart so that They can increase her dose up to 300 mg 3 times daily.  We can potentially titrate her dose higher, depending on her response.  - I prescribed hydrocodone/acetaminophen 5/325 mg 1 tab every 8 hours as needed, 9 with no refills.  I checked the [place] prescription monitoring database.  Risks reviewed.  She is deemed to be low risk.  I will not provide this medication long-term.  I will not provide telephone refills.    4.  INTERVENTIONS:  No interventions were ordered today.  Patient may benefit from interventional pain management if pain fails to improve, depending the results of her MRI.    5.  PATIENT EDUCATION: Patient is in agreement the above plan.  All questions were answered.    6.  FOLLOW-UP: My nurse will call the patient with the results of her MRI.  I will see the patient back in the clinic in about 10 days.  If she has questions or concerns in the meantime, she should not hesitate to call.      SUBJECTIVE:  Corazon Hester  Is a 82 year old female who presents today as a referral from the emergency department (October 29, 2022) for new patient evaluation of pain radiating from the left buttock down the left lower extremity.  Patient reports that she began to have some pain several months ago when lying on her left side.  She states she did not think much of it because when she repositioned the pain improved.  She did have some intermittent flareups of the pain but then 5 days ago pain became much more severe.  She woke up and could not walk or get out of bed on her own.  She had to crawl around the house.  She thought the pain may be related to her worn-out mattress so she tried sleeping on a firmer mattress for pain got progressively worse over the next several days.  On October 29, 2022 the pain was so severe that she went to the emergency department.  They evaluated her and discharged her with prescriptions for cyclobenzaprine and gabapentin.  Medications have helped somewhat although last night she called paramedics to her home because pain was so severe and she could not sleep.  Patient denies any prior history of back or hip problems.    Patient complains of pain which begins in left buttock.  Pain wraps around the lateral thigh and extends into the shin just distal to the knee.  She describes the pain as a burning and excruciating pain.  Last night she also had pain radiating down the lateral shin to the ankle.  Pain does not  reach the foot.  Pain is worse at night.  During the day it is difficult for her to identify triggers for the pain.  Sometimes it seems to depend on how she is sitting.  The pain can also come on strongly when she is walking.  Pain is alleviated somewhat with applying ice.  She denies any right-sided symptoms.  Denies any numbness, tingling, weakness down the legs.  Denies loss of bowel or bladder control.  Denies recent fevers, chills, sweats.    Patient has not had treatments for this.  She did has not had physical therapy.  She does not go to a chiropractor.  She has never had any spine injections or spine surgeries.  She is currently taking gabapentin 100 mg 3 times daily which helps somewhat.  She has found that taking ibuprofen along with the gabapentin provides more relief of her pain.  She has been taking cyclobenzaprine 10 mg as needed but plans to start taking it 3 times daily scheduled.  This is somewhat helpful.    Current Outpatient Medications   Medication     amLODIPine (NORVASC) 5 MG tablet     aspirin (ASA) 81 MG EC tablet     BENADRYL 25 MG OR CAPS     CENTRUM SILVER OR TABS     cyclobenzaprine (FLEXERIL) 10 MG tablet     FISH OIL 1000 MG OR CAPS     gabapentin (NEURONTIN) 100 MG capsule     Ibuprofen (ADVIL PO)     Lactobacillus (PROBIOTIC ACIDOPHILUS PO)     latanoprost (XALATAN) 0.005 % ophthalmic solution     METAMUCIL OR     multivitamin (OCUVITE) TABS     mupirocin (BACTROBAN) 2 % external ointment     VITAMIN C 500 MG OR TABS         Allergies   Allergen Reactions     Calcium GI Disturbance     Tetracycline Diarrhea       Past Medical History:   Diagnosis Date     Duodenal ulcer, unspecified as acute or chronic, without hemorrhage, perforation, or obstruction 1950s?     GERD (gastroesophageal reflux disease)     Described as belching     HTN (hypertension) 2009     Tobacco abuse         Patient Active Problem List   Diagnosis     Hypertension; goal < 150/90     Advanced directives,  counseling/discussion     Tobacco abuse     Bilateral carpal tunnel syndrome       Past Surgical History:   Procedure Laterality Date     HC DILATION/CURETTAGE DIAG/THER NON OB      x3-4     HC REMOVAL OF TONSILS,<13 Y/O       STRIP VEIN  9-11    left leg       Family History   Problem Relation Age of Onset     Cancer Father         Bladder     Hypertension Sister        Social history: Patient is .  She is retired.  She lives alone in a townhouse.  She smokes 4 cigarettes/day.  She has 1 drink per day before dinner.  Denies illicit drug use.       ROS:   Positive for joint pain, muscle pain, sciatica, falls.   Specifically negative for bowel/bladder dysfunction, fevers,chills, appetite changes, unexplained weight loss.   Otherwise 13 systems reviewed are negative.  Please see the patient's intake questionnaire from today for details.      OBJECTIVE:  PHYSICAL EXAMINATION:    CONSTITUTIONAL:  Vital signs as above.  No acute distress.  The patient is well nourished and well groomed.  PSYCHIATRIC:  The patient is awake, alert, oriented to person, place, time and answering questions appropriately with clear speech.    HEENT: Normocephalic, atraumatic.  Sclera clear.  Neck is supple.  SKIN:  Skin over the face, bilateral lower extremities, and posterior torso is clean, dry, intact without rashes.    GAIT:  Gait is mildly antalgic, favoring the left.  The patient is able to heel and toe walk without significant difficulty.    STANDING EXAMINATION: No significant tenderness palpation lower lumbar paraspinous muscles.  No significant tenderness palpation left SI joint, left sciatic notch, left greater trochanter.  Lumbar flexion within normal limits.  Lumbar extension mildly restricted with increased pain.  MUSCLE STRENGTH:  The patient has 4+/5 strength left ankle dorsiflexors, otherwise 5/5 strength for the bilateral hip flexors, knee flexors/extensors, right ankle dorsiflexors, bilateral ankle plantar flexors,  great toe extensors.  NEUROLOGICAL: Reflexes are 2+ right and absent left patellar, 1+ bilateral Achilles.  Negative Babinski's bilaterally.  No ankle clonus bilaterally. Sensation to light touch is intact in the bilateral L4, L5, and S1 dermatomes.  VASCULAR:  2/4 dorsalis pedis pulses bilaterally.  Bilateral lower extremities are warm.  There is no pitting edema of the bilateral lower extremities.  ABDOMINAL:  Soft, non-distended, non-tender throughout all quadrants.  No pulsatile mass palpated in the left lower quadrant.  LYMPH NODES:  No palpable or tender inguinal lymph nodes.  MUSCULOSKELETAL: Left great toe is angled out and her left second toe overlaps her great toe.  Range of motion of both hips is full.  Patient does have some left buttock pain at end external rotation of the left hip.  Straight leg raise is negative bilaterally.

## 2022-11-01 ENCOUNTER — OFFICE VISIT (OUTPATIENT)
Dept: VASCULAR SURGERY | Facility: CLINIC | Age: 82
End: 2022-11-01
Attending: SPECIALIST
Payer: COMMERCIAL

## 2022-11-01 ENCOUNTER — HOSPITAL ENCOUNTER (OUTPATIENT)
Dept: MRI IMAGING | Facility: CLINIC | Age: 82
Discharge: HOME | End: 2022-11-01
Attending: PHYSICIAN ASSISTANT
Payer: COMMERCIAL

## 2022-11-01 VITALS — HEART RATE: 80 BPM | DIASTOLIC BLOOD PRESSURE: 70 MMHG | RESPIRATION RATE: 16 BRPM | SYSTOLIC BLOOD PRESSURE: 118 MMHG

## 2022-11-01 DIAGNOSIS — M54.16 LUMBAR RADICULITIS: ICD-10-CM

## 2022-11-01 DIAGNOSIS — M51.26 LUMBAR DISC HERNIATION: Primary | ICD-10-CM

## 2022-11-01 DIAGNOSIS — M51.26 LUMBAR DISC HERNIATION: ICD-10-CM

## 2022-11-01 DIAGNOSIS — I83.893 VARICOSE VEINS OF BILATERAL LOWER EXTREMITIES WITH OTHER COMPLICATIONS: Primary | ICD-10-CM

## 2022-11-01 PROCEDURE — G0463 HOSPITAL OUTPT CLINIC VISIT: HCPCS | Mod: 25

## 2022-11-01 PROCEDURE — 72148 MRI LUMBAR SPINE W/O DYE: CPT

## 2022-11-01 PROCEDURE — 99213 OFFICE O/P EST LOW 20 MIN: CPT | Performed by: SPECIALIST

## 2022-11-01 NOTE — NURSING NOTE
[facility]        Patient is here for a 4 MONTH follow up  to discuss Varicose vein(s).  The patient states he/she does  wear compression intermittently. No issues.     Pt is currently taking Aspirin.    /70   Pulse 80   Resp 16     The provider has been notified that the patient has no concerns.     Questions patient would like addressed today are: N/A.    Refills are needed: No    Has homecare services and agency name:  Beatriz Dacosta RN

## 2022-11-01 NOTE — PATIENT INSTRUCTIONS
Corazon     Thank you for entrusting your care with us at the [facility].     We are prescribing some compression stockings for you. I have included different suppliers that should help you get measured and fitting to ensure proper fitting socks. You should wear these stockings as much as you can. It is especially important to wear them with long periods of standing, sitting, long car rides or if you will be flying. Compression socks should get refilled every 4-6 months. They do not need to be worn at night while in bed. It is recommended to wear compression level of 20-30mmhg or higher from toes to knees.         Varicose Veins      Varicose veins are swollen, enlarged veins most often found in the legs. They are usually blue or purple in color and may bulge, twist, and stand out under the skin.  Normally, veins return blood from the body to the heart. The leg veins have one-way valves that prevent blood from flowing backward in the vein. When the valves are weak or damaged, blood backs up in the veins. This may cause some of the veins to swell and bulge and become varicose veins.      Symptoms    Varicose veins may or may not cause symptoms. If symptoms do occur, they can include:  Legs that feel tired, achy, heavy, or itchy  Leg muscle cramps  Skin changes, such as discoloration, dryness, redness, or rash (in more severe cases, you may also have sores on the skin called venous leg ulcers)    Pain & Discomfort  Pain, itching, swelling, burning, leg heaviness or tiredness, skin discoloration. Symptoms typically worsen throughout the day and are partially relieved by elevation or wearing compression socks or stockings.    Sometimes, varicose veins clot and become painful, hot, hard and discolored. This is called phlebitis, an uncomfortable but temporary condition that will get better on its own in 2-3 months. Clots associated with phlebitis are limited to surface veins, and not dangerous  - unlike clots in the deep veins (deep vein thrombosis or DVT) that are dangerous because they can travel to the heart or lung and require prompt treatment with blood thinners.    Bleeding  A shower or minor trauma can cause a varicose vein to burst and bleed.    Risk Factors    There are a number of factors that increase the risk for varicose veins. These can include:  Being a woman  Being older  Sitting or standing for long periods  Being overweight  Being pregnant  Having a family history of varicose veins    Among other things, veins are responsible for bringing blood back to the heart, sometimes working against gravity. When you walk, muscles in your leg squeeze the veins and help blood flow back into the heart. In normal veins, a series of valves assist this process. With varicose veins and with a related condition called chronic venous insufficiency, poorly functioning valves allow the blood to pool in the lower leg and cause symptoms.     Treatment begins with simple self-help measures (see below). If these don t help, there are many procedures that can be done to shrink or remove varicose veins. Your healthcare provider can tell you more about these options, if needed.    Home care  Support or compression stockings will likely be prescribed. If so, be sure to wear them as directed. They may help improve blood flow.  Exercising helps strengthen your leg muscles and improve blood flow. To get the most benefit, choose exercises such as walking, swimming, or cycling. Also try to exercise for at least 30 minutes on most days.  Raising (elevating) your legs lets gravity help blood flow back to the heart. Sit or lie with your feet above heart level a few times throughout the day, or as directed.  Avoid long periods of sitting or standing. Change positions often. Also, move your ankles, toes and knees often. This may also help improve blood flow.  If you are overweight, talk with your healthcare provider about  setting up a weight-loss plan. Maintaining a healthy weight can help reduce the strain on your veins. It may also improve symptoms, such as swelling and aching.  If you have dryness and itching, ask your provider about special lotions that can be applied to the skin to help improve symptoms.    When to seek medical advice  Call your healthcare provider right away if any of these occur:  Sudden, severe leg swelling, pain, or redness  Symptoms worsen, or they don t improve with self-care  Bleeding from any affected veins  Ulcers form on the legs, ankles, or feet  Fever of 100.4 F (38 C) or higher, or as advised by your provider      Understanding Spider and Varicose Veins    Do you often hide your legs because of the way they look? You may have noticed tiny red or blue bursts (spider veins). Or maybe you have veins that bulge or look twisted (varicose veins). If so, there are treatments that can help    What are the symptoms?  Spider veins or varicose veins may never be a problem. But sometimes they can cause legs to ache or swell. Your legs may also feel heavy and tired, or like they re burning. These symptoms may be more severe at the end of the day. Prolonged sitting or standing can also make your symptoms worse.    Who gets spider and varicose veins?  Anyone can get spider or varicose veins. But vein problems tend to be hereditary (run in families). Other factors that can affect veins include:  Pregnancy, hormones, and birth control pills  A job where you stand or sit a lot  Extra weight or lack of exercise  Age                       What can be done?  Spider and varicose veins can affect the way you feel about yourself. Talk to your healthcare provider about your concerns. There are treatments that can ease symptoms and make your legs look better.    Your treatment choices  Treatment may include self-care, sclerotherapy (injecting veins with a chemical), surgery, or newer nonsurgical minimally invasive therapies.  Spider veins and some varicose veins can be treated with sclerotherapy. Large varicose veins can often be treated with newer minimally invasive procedures and, in rare cases, surgery may be needed.     Please bring your prescription to a home medical supply store. Here is a list of locations but not limited to.     [facility]  [facility]  [address] [place] [address]  Phone: [phone]  Fax: [phone] [facility]  [address] [place] [address]  Phone: [phone]  Fax: [phone]   [facility] Professional [facility]ld[facility]  [address] [place] [address]  Phone: [phone]  Fax: [phone] [facility]  [address]  [address] [place] [address]  Phone: [phone]  Fax: [phone]   [facility]  [address] [place] [address]  Phone: [phone] [facility]   [address] ([facility])  [place] [address]  Phone: [phone]   [place]  [address] [place] [address]      Phone: [phone]  Fax: [phone] [place]  [address] [place] [address]      Phone: [phone]  Fax: [phone]     [facility] [email or web address]  [address], [place] [address]  [phone]    [facility]  [email or web address]  [address] [place] [address]  Phone: [phone]     [address] [place] [address]  Phone: [phone]    [address] [place] [address] [address] [place] [address]  Phone: [phone] [address], [place] [address]  Phone: [phone] [address], [place] [address]  Phone: [phone]    [facility] [email or web address]  [address], [place]  [address]  Phone: [phone]    [address], [place] [address]  Phone: [phone] [address] [place] [address]  Phone: [phone]    [employer]  [email or web address]  [phone]            Reference: [employer] and [employer]- Dr Dave Mcmillan: [email or web address]-[email or web address]-veins#resource-185      If you have any questions or problems prior to us seeing you at your next scheduled visit please do not hesitate to call us at [phone].    Dr Lawson Tate MD & Dusty CARMONA RN

## 2022-11-02 ENCOUNTER — HOSPITAL ENCOUNTER (OUTPATIENT)
Dept: PHYSICAL THERAPY | Facility: REHABILITATION | Age: 82
Discharge: HOME | End: 2022-11-02
Payer: COMMERCIAL

## 2022-11-02 DIAGNOSIS — M51.26 LUMBAR DISC HERNIATION: ICD-10-CM

## 2022-11-02 DIAGNOSIS — M54.16 LUMBAR RADICULITIS: ICD-10-CM

## 2022-11-02 PROCEDURE — 97161 PT EVAL LOW COMPLEX 20 MIN: CPT | Mod: GP

## 2022-11-02 NOTE — PROGRESS NOTES
[facility]    OUTPATIENT PHYSICAL THERAPY ORTHOPEDIC EVALUATION  PLAN OF TREATMENT FOR OUTPATIENT REHABILITATION  (COMPLETE FOR INITIAL CLAIMS ONLY)  Patient's Last Name, First Name, M.I.  YOB: 1940  Corazon Hester    Provider s Name:  [facility]   Medical Record No.  [id number]   Start of Care Date:  11/02/22   Onset Date:  06/13/22   Type:     _X__PT   ___OT   ___SLP Medical Diagnosis:  Lumbar disc herniation  Lumbar radiculitis     PT Diagnosis:  Lumbar radicular pain   Visits from SOC:  1      _________________________________________________________________________________  Plan of Treatment/Functional Goals:  manual therapy, neuromuscular re-education, ROM, strengthening, stretching     Cryotherapy, Electrical stimulation, Hot packs, TENS     Goals  Goal Identifier: (P) HEP  Goal Description: (P) Corazon will demonstrate mastery of and compliance with home exercise program at least 4/7 days a week to facilitate improved recovery.  Target Date: (P) 11/30/22    Goal Identifier: (P) DARIAN  Goal Description: (P) Corazon will demonstrate improved function as evidenced by an improved (decreased) DARIAN score of less than 5%.  Target Date: (P) 01/11/23    Goal Identifier: (P) Sleep  Goal Description: (P) Corazon will be able to sleep through the night without disturbance due to back and leg pain on at least 5/7 nights a week.  Target Date: (P) 01/11/23    Goal Identifier: (P) Left Lower Extremity Strength  Goal Description: (P) Corazon will demonstrate improved left leg lower extremity strength as evidenced by 5/5 MMT for all measured muscle groups.  Target Date: (P) 01/11/23                                                Therapy Frequency:  1 time/week  Predicted Duration of Therapy Intervention:  10 weeks    Adonay Wang, PT                 I CERTIFY THE NEED  FOR THESE SERVICES FURNISHED UNDER        THIS PLAN OF TREATMENT AND WHILE UNDER MY CARE     (Physician co-signature of this document indicates review and certification of the therapy plan).                     Certification Date From:  11/02/22   Certification Date To:  (P) 01/11/23    Referring Provider:  Miley Burroughs PA-C    Initial Assessment        See Epic Evaluation Start of Care Date: 11/02/22 11/02/22 1400   General Information   Type of Visit Initial OP Ortho PT Evaluation   Start of Care Date 11/02/22   Referring Physician Miley Burroughs PA-C   Orders Evaluate and Treat   Date of Order 11/01/22   Certification Required? Yes   Medical Diagnosis Lumbar disc herniation  Lumbar radiculitis   Surgical/Medical history reviewed Yes   Presentation and Etiology   Pertinent history of current problem (include personal factors and/or comorbidities that impact the POC) Corazon reports that in early summer of 2022 her back started to get a bit sore without any mechanism of injury, but it was very mild and infrequent and only ever bothered her while in bed. Over time, she started to get left hip/low back pain, but again she reports mild and intermittent symptoms. Then a few weeks ago, her pain got progressively worse until 10/25/22 she wasn't able to sleep due to the pain, and she went to the ER a few days later due to persistent pain, and then she went to the spine clinic on Monday. She was prescribed several medications in the ER which she doesn't think have helped much. But then at the spine clinic she was prescribed prednisone, which she thinks has helped. She has also started sleeping on the couch, which she thinks helps. She now complains of continued low back pain and pain down her left leg to her knee. She does report that she thinks she is losing strength in the left leg. She denies any numbness or tingling. She thinks she might be  a little constipated due to the medications. She reports that about 30 years ago she had some sciatic, but otherwise she has not experienced anything like this.   Impairments A. Pain;C. Swelling;D. Decreased ROM;E. Decreased flexibility;F. Decreased strength and endurance;G. Impaired balance;H. Impaired gait;J. Burning   Functional Limitations perform activities of daily living;perform desired leisure / sports activities   Symptom Location See history   How/Where did it occur   (See history)   Onset date of current episode/exacerbation 06/13/22   Chronicity New   Pain rating (0-10 point scale) Best (/10);Worst (/10);Other   Best (/10) 0/10   Worst (/10) 10/10   Pain rating comment Current: 4/10   Pain quality A. Sharp;D. Burning   Frequency of pain/symptoms A. Constant   Pain/symptoms are: Worse during the night   Pain/symptoms exacerbated by G. Certain positions;J. ADL;K. Home tasks;I. Bending   Pain/symptoms eased by H. Cold;I. OTC medication(s);E. Changing positions;F. Certain positions;K. Other   Pain eased by comment Old mattress   Progression of symptoms since onset: Worsened   Current / Previous Interventions   Diagnostic Tests: MRI   MRI Results Results   MRI results IMPRESSION:  1.  Thoracolumbar levoscoliosis with moderate to severe spondylosis and degenerative anterior spondylolisthesis at L4-L5. No significant spinal canal stenosis.  2.  Mild left lateral recess stenosis at L4-L5 with moderate to severe left foraminal stenosis.  3.  Severe left lateral recess stenosis at L3-L4 with moderate to severe left foraminal stenosis.  4.  Mild right lateral recess stenosis at L2-L3 with mild to moderate foraminal stenosis.   Prior Level of Function   Prior Level of Function-Mobility Independent   Prior Level of Function-ADLs Independent   Fall Risk Screen   Fall screen completed by PT   Have you fallen 2 or more times in the past year? No   Have you fallen and had an injury in the past year? Yes   Is patient a  fall risk? No   Fall screen comments Patient tripped and fell while looking away from where she was walking. She chipped a tooth.   Abuse Screen (yes response referral indicated)   Feels Unsafe at Home or Work/School no   Feels Threatened by Someone no   Does Anyone Try to Keep You From Having Contact with Others or Doing Things Outside Your Home? no   Physical Signs of Abuse Present no   Patient needs abuse support services and resources No   System Outcome Measures   Outcome Measures   (DARIAN: 22%)   Lumbar Spine/SI Objective Findings   Observation Patient reported swelling, but on measurement, there was no appreciable swelling noted.   Gait/Locomotion Grossly normal   Flexion ROM WNL (min pain)   Extension ROM 0% to 25% (min pain)   Right Side Bending ROM Distal thigh   Left Side Bending ROM Mid thigh   Lumbar ROM Comment Bilateral rotation: 75% to 99%   Hip Screen Left hip IR: limited. All other bilateral flexion and IR/ER: WFL.   Transversus Abdominus Strength (Naeem Leg Lowering-deg) Decreased   Hip Flexion (L2) Strength Right: 5/5. Left: 4/5.   Hip Abduction Strength Bilateral: 5/5.   Hip Adduction Strength Bilateral: 5/5.   Knee Flexion Strength Bilateral: 5/5.   Knee Extension (L3) Strength Right: 5/5. Left: 4+/5.   Ankle Dorsiflexion (L4) Strength Bilateral: 5/5.   Great Toe Extension (L5) Strength Right: 5/5. Left: slightly limited compared.   Ankle Plantar Flexion (S1) Strength Bilateral 5/5.   Slump Test Negative bilateral   Sensation Testing Bilateral lower extremity dermatomes intact to light touch   Patellar Tendon Reflexes  Right: 2+. Left: 0.   Achilles Tendon Reflexes Right: 2+. Left: 0.   Neurological Testing Comments Ankle clonus and Welch's: negative bilateral   Planned Therapy Interventions   Planned Therapy Interventions manual therapy;neuromuscular re-education;ROM;strengthening;stretching   Planned Modality Interventions   Planned Modality Interventions Cryotherapy;Electrical  stimulation;Hot packs;TENS   Clinical Impression   Criteria for Skilled Therapeutic Interventions Met yes, treatment indicated   PT Diagnosis Lumbar radicular pain   Influenced by the following impairments Low back and left leg shooting pain, decreased ROM, decreased strength, decreased deep tendon reflexes   Functional limitations due to impairments Stand, sleep, walk, and other ADLs   Clinical Presentation Evolving/Changing   Clinical Decision Making (Complexity) Low complexity   Therapy Frequency 1 time/week   Predicted Duration of Therapy Intervention (days/wks) 10 weeks   Risk & Benefits of therapy have been explained Yes   Patient, Family & other staff in agreement with plan of care Yes   Clinical Impression Comments Corazon is an 82-year-old female who presents to physical therapy with signs and symptoms consistent with referring diagnosis of lumbar radiculitis, including low back and shooting lower extremity pain, decreased ROM, decreased lower extremity strength, and decreased deep tendon reflexes. These impairments limit her ability to walk, sleep, stand, and perform other ADLs without pain or limitation. She will benefit from skilled therapy to address the listed impairments and limitations and return to full pain-free function.   Ortho Goal 1   Goal Identifier HEP   Goal Description Corazon will demonstrate mastery of and compliance with home exercise program at least 4/7 days a week to facilitate improved recovery.   Goal Progress In progress   Target Date 11/30/22   Ortho Goal 2   Goal Identifier DARIAN   Goal Description Corazon will demonstrate improved function as evidenced by an improved (decreased) DARIAN score of less than 5%.   Goal Progress 22%   Target Date 01/11/23   Ortho Goal 3   Goal Identifier Sleep   Goal Description Corazon will be able to sleep through the night without disturbance due to back and leg pain on at least 5/7 nights a week.   Goal Progress Unable   Target Date 01/11/23   Ortho  Goal 4   Goal Identifier Left Lower Extremity Strength   Goal Description Corazon will demonstrate improved left leg lower extremity strength as evidenced by 5/5 MMT for all measured muscle groups.   Goal Progress Unable   Target Date 01/11/23   Total Evaluation Time   PT Eval, Low Complexity Minutes (00686) 31   Therapy Certification   Certification date from 11/02/22   Certification date to 01/11/23   Medical Diagnosis Lumbar disc herniation  Lumbar radiculitis

## 2022-11-02 NOTE — PROGRESS NOTES
[facility] Surgery Follow up    HPI:    82 year old year old female who returns for a follow up.  Status post closure right greater saphenous vein and left anterior excessively saphenous vein.  She is 4 months from procedure here for follow-up.  She is doing quite well has no major complaints issues or problems still wears compression at least 50% of time is very active and is no weight issues.    No complaints at this time point is doing well.    Allergies:Calcium and Tetracycline    Past Medical History:   Diagnosis Date     Duodenal ulcer, unspecified as acute or chronic, without hemorrhage, perforation, or obstruction 1950s?     GERD (gastroesophageal reflux disease)     Described as belching     HTN (hypertension) 2009     Tobacco abuse        Past Surgical History:   Procedure Laterality Date     HC DILATION/CURETTAGE DIAG/THER NON OB      x3-4     HC REMOVAL OF TONSILS,<13 Y/O       STRIP VEIN  9-11    left leg       CURRENT MEDS:  Current Outpatient Medications   Medication     amLODIPine (NORVASC) 5 MG tablet     aspirin (ASA) 81 MG EC tablet     BENADRYL 25 MG OR CAPS     CENTRUM SILVER OR TABS     cyclobenzaprine (FLEXERIL) 10 MG tablet     FISH OIL 1000 MG OR CAPS     gabapentin (NEURONTIN) 100 MG capsule     gabapentin (NEURONTIN) 100 MG capsule     HYDROcodone-acetaminophen (NORCO) 5-325 MG tablet     Ibuprofen (ADVIL PO)     Lactobacillus (PROBIOTIC ACIDOPHILUS PO)     latanoprost (XALATAN) 0.005 % ophthalmic solution     METAMUCIL OR     methylPREDNISolone (MEDROL DOSEPAK) 4 MG tablet therapy pack     multivitamin (OCUVITE) TABS     mupirocin (BACTROBAN) 2 % external ointment     VITAMIN C 500 MG OR TABS     No current facility-administered medications for this visit.       Family History   Problem Relation Age of Onset     Cancer Father         Bladder     Hypertension Sister         reports that she has been smoking cigarettes. She has a 12.50 pack-year smoking history. She has never used smokeless  tobacco. She reports current alcohol use of about 7.0 standard drinks per week. She reports that she does not use drugs.    Review of Systems:  Negative except history of venous issues and closure in the past.Otherwise twelve system of review is negative.      OBJECTIVE:  Vitals:    11/01/22 1000   BP: 118/70   Pulse: 80   Resp: 16     There is no height or weight on file to calculate BMI.    Status: doing well    EXAM:  GENERAL: This is a well-developed 82 year old female who appears her stated age  HEAD: normocephalic  HEENT: Pupils equal and reactive bilaterally  CARDIAC: RRR without murmur  CHEST/LUNG:  Clear to auscultation  ABDOMEN: Soft, nontender, nondistended, no masses    NEUROLOGIC: Focally intact, nonfocal  VASCULAR: Pulses intact, symmetrical upper and lower extremities.                Side:: Bilateral  VCSS  PAIN:: Mild: Occasional, not restricting activity of requiring pain medication  Varicose Veins:: Mild: Few scattered  Venous Edema:: Mild: Evening ankle swelling only  Skin Pigmentation:: Mild: Diffuse, but limited in area and old (brown)  Inflamation:: Absent: None  Induration:: Absent: None  Number of active ulcers:: 0  Active ulcer duration:: None  Active ulcer diameter:: None  Compression Therapy:: Wears elastric stockings most days  VCSS Score:: 6  CEAP:: Superficial spider veins (reticular veins) only    LABS:  Lab Results   Component Value Date    WBC 5.3 05/10/2021    WBC 6.3 08/12/2019    HGB 15.6 05/10/2021    HGB 15.6 08/12/2019    HCT 46.8 05/10/2021    HCT 46.9 08/12/2019    MCV 96 05/10/2021    MCV 97 08/12/2019     05/10/2021     08/12/2019       Lab Results   Component Value Date    ALT 13 05/11/2022    AST 18 05/11/2022    ALKPHOS 112 05/11/2022        Images:     Exam Information    Exam Date Exam Time Accession # Performing Department Results    5/5/22 11:22 AM [id number] [facility] [place]      PACS Images     Show  images for US Venous Post Ablation Legs Bilateral     Study Result    Narrative & Impression   Bilateral Venous Ultrasound Status Post Radiofrequency Ablation (Date: 05/05/22)        Indication: Follow-up saphenous vein Radiofrequency ablation     Date of Procedure: Right 05/03/22   Left 05/03/22      Right CFV/SFJ Compression:  Fully Compressible     Left CFV/SFJ Compression:  Fully Compressible     Reference:   (FC)-Fully Compressible           (PC)-Partially Compressible   (NC) Non-Compressible     Location Right GSV Left AASV   Proximal Thigh NC NC   Mid Thigh NC NC   Distal Thigh NC FC   Knee NC     Proximal Calf NC     Mid Calf NC     Distal Calf FC        Impression: Status post radiofrequency ablation for the right great saphenous vein and the left anterior accessory saphenous vein, right GSV is noncompressible extending from the proximal thigh all the way to the mid calf, left anterior accessory saphenous vein is noncompressible from the proximal thigh to the mid thigh, no evidence of DVT         Assessment/Plan:   Varicose veins of bilateral lower extremities with other complications     Doing well post procedure no major complaints or problems she is out 4 months for procedure 6 months at least and she is now is here for follow-up after exam discussion at this time we will discontinue her Concerta current stocking use exercise keeping better control if she has problems use or changes to come back and see us anytime point.  Discussed and answered all questions she is happy with her plan    No follow-ups on file.     Lawson Tate MD ,MD  [facility] Department of Surgery

## 2022-11-03 ENCOUNTER — TELEPHONE (OUTPATIENT)
Dept: PHYSICAL MEDICINE AND REHAB | Facility: CLINIC | Age: 82
End: 2022-11-03

## 2022-11-03 NOTE — TELEPHONE ENCOUNTER
Phone call to patient to review results and provider's recommendations. Results given and explained. Explained PSP would talk to her more about the findings at her follow up appointment next week.     Patient asked for her PT appointments as she had lost the print out of all her upcoming sessions. All appointments dates, times and locations given.

## 2022-11-03 NOTE — TELEPHONE ENCOUNTER
----- Message from Miley Burroughs PA-C sent at 11/2/2022  4:28 PM CDT -----  Please call this patient and let her know that her MRI shows significant narrowing around a nerve on the left side as it exits out of the spine which correlates with her symptoms.  We will review in detail at her follow up visit next week.  If symptoms are not improved by then we will discuss injections.

## 2022-11-07 ENCOUNTER — HOSPITAL ENCOUNTER (OUTPATIENT)
Dept: PHYSICAL THERAPY | Facility: REHABILITATION | Age: 82
Discharge: HOME | End: 2022-11-07
Payer: COMMERCIAL

## 2022-11-07 DIAGNOSIS — M54.16 LUMBAR RADICULITIS: ICD-10-CM

## 2022-11-07 DIAGNOSIS — M51.26 LUMBAR DISC HERNIATION: Primary | ICD-10-CM

## 2022-11-07 PROCEDURE — 97140 MANUAL THERAPY 1/> REGIONS: CPT | Mod: GP | Performed by: PHYSICAL THERAPIST

## 2022-11-07 PROCEDURE — 97110 THERAPEUTIC EXERCISES: CPT | Mod: GP | Performed by: PHYSICAL THERAPIST

## 2022-11-07 NOTE — PROGRESS NOTES
"Daily Note    Subj: has fallen a few times since last session due to L leg weakness/buckling. Doing nerve glides, about 100+/day. Having some L thigh pain.    Obj:  Using SEC- not stable with it. Using it on L sides.   Unable to lift leg up onto nu step by itself. Needs use of UE's to help lift leg    Assessment/Plan: Rec pt use a FWW at all. Also rec pt use SEC in R side. She tolerated session well without inc in sxs during manual therapy. She did have some L femoral nerve tension testing so this was addressed with new nerve glides today. Will continue per POC.    Date 11/7   Exercise    Seated n glides Already doing   Nu step 5' L3   SKTC and mod piriformis stretch 30\" holds x2-3 B   Standing femoral n glides x10 on L (started to inc symptoms so it was terminated)                                     Albert Cota, PT, DPT  "

## 2022-11-08 ENCOUNTER — TELEPHONE (OUTPATIENT)
Dept: PHYSICAL MEDICINE AND REHAB | Facility: CLINIC | Age: 82
End: 2022-11-08

## 2022-11-08 NOTE — TELEPHONE ENCOUNTER
Voicemail received from pt at 0916 and also message sent from  scheduling at 0922 requesting to call pt back.     Call placed to pt. She reports she is in a lot of pain and inquires about refill of her Hydrocodone/Acetaminophen 5/325 mg tablets. Pt informed she would need appointment to discuss refill. Pt currently scheduled for 11/10. She does not feel she could wait this long.     Pt also did complete lumbar MRI so imaging can be reviewed at this appointment as well. Transferred pt to scheduling to move appt up so pt could be seen sooner for imaging review and discuss refill.

## 2022-11-10 ENCOUNTER — OFFICE VISIT (OUTPATIENT)
Dept: PHYSICAL MEDICINE AND REHAB | Facility: CLINIC | Age: 82
End: 2022-11-10
Payer: COMMERCIAL

## 2022-11-10 VITALS
WEIGHT: 113 LBS | BODY MASS INDEX: 22.19 KG/M2 | DIASTOLIC BLOOD PRESSURE: 68 MMHG | HEART RATE: 92 BPM | SYSTOLIC BLOOD PRESSURE: 103 MMHG | HEIGHT: 60 IN

## 2022-11-10 DIAGNOSIS — M54.16 LUMBAR RADICULITIS: Primary | ICD-10-CM

## 2022-11-10 DIAGNOSIS — M43.16 SPONDYLOLISTHESIS OF LUMBAR REGION: ICD-10-CM

## 2022-11-10 PROCEDURE — 99214 OFFICE O/P EST MOD 30 MIN: CPT | Performed by: PHYSICIAN ASSISTANT

## 2022-11-10 RX ORDER — HYDROCODONE BITARTRATE AND ACETAMINOPHEN 5; 325 MG/1; MG/1
1 TABLET ORAL 2 TIMES DAILY PRN
Qty: 18 TABLET | Refills: 0 | Status: SHIPPED | OUTPATIENT
Start: 2022-11-10 | End: 2022-11-13

## 2022-11-10 ASSESSMENT — PAIN SCALES - GENERAL: PAINLEVEL: MILD PAIN (3)

## 2022-11-10 NOTE — PATIENT INSTRUCTIONS
A left L4/5 epidural steroid injection has been ordered today.      Please note that this injection uses cortisone.  The cortisone may somewhat weaken the immune system.  It is unknown how much the immune system is weakened.  It is unknown if it is weakened to the point that you may be more likely to get the COVID-19 virus, or if you do get the COVID-19 virus, if you would be sicker than you would have been if you had not had the cortisone injection.  If you do not wish to proceed with the injection, please let the nurse/physician know and do NOT schedule the injection.    Please note that since your immune system is weakened from the cortisone, having any vaccine/shot may be less effective if you have this vaccine within 2 weeks from your cortisone injection.  It is advised to wait 2 weeks after your cortisone injection to have any vaccine (or if you have a vaccine first, wait 2 weeks before you have the cortisone injection).    Please schedule this injection at least 1 week  from now to allow time for insurance prior authorization.  On the day of your injection, you cannot be sick or taking antibiotics.  If you become sick and are prescribed, please call the clinic so your injection can be rescheduled for once you have completed your antibiotics.  You will need to bring a  with you for your injection.   If you have any questions or concerns prior to your injection, please do not hesitate to call the nurse navigation line at [phone] or contact Miley Burroughs through [employer].       Hydrocodone/acetaminophen was prescribed today. Please lock this medication up when you are not taking it. Do not share this medication with other people. Do not increase the dose without permission from your physician. Do not drink alcohol while you take this medication as this can lead to death. Do not take other pain medications without approval from your physician or this can also lead to death. If you need a refill of this  medication, you must come in to clinic by appointment. Please call if you have any questions on how to take this medication.

## 2022-11-10 NOTE — LETTER
11/10/2022         RE: Corazon Hester  [address] [facility] [place] [address]        Dear Colleague,    Thank you for referring your patient, Corazon Hester, to the [facility]. Please see a copy of my visit note below.    Assessment:   Corazon Hester is a 82 year old y.o. female with past medical history significant for  GERD, history of duodenal ulcer hypertension, tobacco abuse who presents today for follow-up regarding severe pain radiating from the left buttock into the left lower extremity with associated numbness, tingling, weakness.  Symptoms are most consistent with left L4 radiculopathy.  My review of an MRI lumbar spine shows severe left lateral recess stenosis at L3-4 where I believe she is trapping the left L4 nerve root.  There is also moderate to severe left foraminal stenosis at L4-5.  On exam today patient had an absent left patellar reflex.  She had slight weakness left knee extensors.       Plan:     A shared decision making plan was used.  The patient's values and choices were respected.  The following represents what was discussed and decided upon by the physician assistant and the patient.      1.  DIAGNOSTIC TESTS: I reviewed the MRI lumbar spine.  No further diagnostic tests were ordered.    2.  PHYSICAL THERAPY: Patient is currently in physical therapy.  She has had 1 session so far.  Encouraged her to continue with physical therapy and the home exercises.    3.  MEDICATIONS:    -Patient completed a Medrol Dosepak.  -Patient can continue cyclobenzaprine 3 times daily as needed.  -Patient is currently taking gabapentin 200 mg in the morning, 200 mg in the afternoon, and 300 mg in the evening.  She will continue to titrate her dose up to 300 mg 3 times daily.  -I refilled the patient's hydrocodone/acetaminophen 5/325 mg 1 tab twice daily as needed, #14 with no refills.  I checked the [place] prescription monitoring database.  She is deemed  to be low risk.  Risks reviewed.  I will not provide this medication long-term.  I will not provide telephone refills.    4.  INTERVENTIONS:    -I offer the patient a left L4-5 transforaminal epidural steroid injection.  Patient indicated she would like to proceed and an order was placed.  - If this does not help, we could try left L3-4 transforaminal epidural steroid injection.    5.  PATIENT EDUCATION:  - Patient is in agreement the above plan.  All questions were answered.  - If patient fails to improve with conservative treatment I would recommend a referral to neurosurgery.    6.  FOLLOW-UP: Patient can follow-up with me 2 weeks after her left L4-5 transforaminal epidural steroid injection.  If she has questions or concerns in the meantime, she should not hesitate to call.    Subjective:     Corazon Hester is a 82 year old female who presents today for follow-up regarding severe pain radiating from the left buttock down the left lower extremity.  I saw the patient in consultation October 31, 2022.  I ordered an MRI lumbar spine which will be described low.  I also prescribed a Medrol Dosepak and a limited quantity of hydrocodone/acetaminophen.  I also increased her gabapentin and refilled her cyclobenzaprine.  Patient reports slight improvement in her pain.    Patient complains of left-sided buttock pain.  Patient describes a burning pain that radiates down the posterior lateral thigh.  She then experiences significant pain in the left anterior knee.  She has intermittent numbness left anterior shin.  She states the numbness and tingling is stable.  She feels the weakness in her leg is somewhat better but she does have to walk with a cane for stability because her left leg sometimes gives way.  She rates her pain today as a 3 out of 10.  At its worst it is a 7 out of 10.  At its best it is a 0 out of 10.  Pain is worse in the middle of the night.  It makes it difficult for her to sleep.  She has more pain  getting up in the morning and with increased activity during the day.  Pain is alleviated with applying ice.    Patient is currently in physical therapy.  She has had 1 session so far.  She feel like this is helpful.  She is taking gabapentin 200 mg in morning, 200 mg in the afternoon, and 300 mg at bedtime, cyclobenzaprine 10 mg 3 times daily.  She also takes Advil as needed.  These medications are helpful.  She ran out of hydrocodone which was very helpful, especially for sleeping at night.  She completed her Medrol Dosepak.    Review of Systems:  Positive for numbness/tingling, weakness, pain much worse at night, trip/stumble/falls, difficulty with hand skills.  Negative for loss of bowel/bladder control, inability to urinate, headache, difficulty swallowing, fevers, unintentional weight loss.     Objective:   CONSTITUTIONAL:  Vital signs as above.  No acute distress.  The patient is well nourished and well groomed.    PSYCHIATRIC:  The patient is awake, alert, oriented to person, place and time.  The patient is answering questions appropriately with clear speech.  Normal affect.  HEENT: Normocephalic, atraumatic.  Sclera clear.    SKIN:  Skin over the face, posterior torso, bilateral upper and lower extremities is clean, dry, intact without rashes.  VASCULAR: No significant lower extremity edema.  MUSCULOSKELETAL:  Gait is antalgic, favoring the left.     The patient has 4/5 strength left knee extensors, otherwise 5/5 strength for the bilateral hip flexors, knee flexors, right knee extensors, bilateral ankle dorsiflexors/plantar flexors.  No tenderness to palpation about the left knee.  NEUROLOGICAL: 2+ right and absent left patellar, 1+ bilateral achilles reflexes.  No ankle clonus bilaterally.  Sensation to light touch is intact in the bilateral L4, L5, and S1 dermatomes.       RESULTS: I reviewed the MRI lumbar spine from [facility] dated November 1, 2022.  This shows thoracolumbar levoscoliosis with moderate  to severe spondylosis.  There is degenerative spondylolisthesis L4-5.  At L2-3 there is mild to moderate right foraminal stenosis.  At L3-4 there is severe left lateral recess stenosis and mild spinal canal stenosis with moderate to severe left and mild right foraminal stenosis.  At L4-5 there is moderate to severe left foraminal stenosis.  At L5-S1 there is mild bilateral foraminal stenosis.          Again, thank you for allowing me to participate in the care of your patient.        Sincerely,        Miley Burroughs PA-C

## 2022-11-15 ENCOUNTER — RADIOLOGY INJECTION OFFICE VISIT (OUTPATIENT)
Dept: PHYSICAL MEDICINE AND REHAB | Facility: CLINIC | Age: 82
End: 2022-11-15
Attending: PHYSICIAN ASSISTANT
Payer: COMMERCIAL

## 2022-11-15 VITALS
RESPIRATION RATE: 16 BRPM | SYSTOLIC BLOOD PRESSURE: 116 MMHG | HEART RATE: 87 BPM | DIASTOLIC BLOOD PRESSURE: 72 MMHG | OXYGEN SATURATION: 97 % | TEMPERATURE: 98.2 F

## 2022-11-15 DIAGNOSIS — M54.16 LUMBAR RADICULITIS: ICD-10-CM

## 2022-11-15 PROCEDURE — 64483 NJX AA&/STRD TFRM EPI L/S 1: CPT | Mod: LT | Performed by: PHYSICAL MEDICINE & REHABILITATION

## 2022-11-15 RX ORDER — LIDOCAINE HYDROCHLORIDE 10 MG/ML
INJECTION, SOLUTION EPIDURAL; INFILTRATION; INTRACAUDAL; PERINEURAL
Status: COMPLETED | OUTPATIENT
Start: 2022-11-15 | End: 2022-11-15

## 2022-11-15 RX ORDER — METHYLPREDNISOLONE ACETATE 80 MG/ML
INJECTION, SUSPENSION INTRA-ARTICULAR; INTRALESIONAL; INTRAMUSCULAR; SOFT TISSUE
Status: COMPLETED | OUTPATIENT
Start: 2022-11-15 | End: 2022-11-15

## 2022-11-15 RX ADMIN — METHYLPREDNISOLONE ACETATE 80 MG: 80 INJECTION, SUSPENSION INTRA-ARTICULAR; INTRALESIONAL; INTRAMUSCULAR; SOFT TISSUE at 11:22

## 2022-11-15 RX ADMIN — LIDOCAINE HYDROCHLORIDE 2 ML: 10 INJECTION, SOLUTION EPIDURAL; INFILTRATION; INTRACAUDAL; PERINEURAL at 11:22

## 2022-11-15 ASSESSMENT — PAIN SCALES - GENERAL
PAINLEVEL: SEVERE PAIN (6)
PAINLEVEL: MILD PAIN (2)

## 2022-11-15 NOTE — PATIENT INSTRUCTIONS
Follow-up visit with ESTELA Shetty in 2 weeks to discuss injection outcome and determine care plan going forward.       DISCHARGE INSTRUCTIONS    During office hours (8:00 a.m.- 4:00 p.m.) questions or concerns may be answered  by calling [facility] Center Navigation Nurses at  [phone].  Messages received after hours will be returned the following business day.      In the case of an emergency, please dial 911 or seek assistance at the nearest Emergency Room/Urgent Care facility.     All Patients:    You may experience an increase in your symptoms for the first 2 days (It may take anywhere between 2 days- 2 weeks for the steroid to have maximum effect).    You may use ice on the injection site, as frequently as 20 minutes each hour if needed.    You may take your pain medicine.    You may continue taking your regular medication after your injection. If you have had a Medial Branch Block you may resume pain medication once your pain diary is completed.    You may shower. No swimming, tub bath or hot tub for 48 hours.  You may remove your bandaid/bandage as soon as you are home.    You may resume light activities, as tolerated.    Resume your usual diet as tolerated.    It is strongly advised that you do not drive for 1-3 hours post injection.    If you have had oral sedation:  Do not drive for 8 hours post injection.      If you have had IV sedation:  Do not drive for 24 hours post injection.  Do not operate hazardous machinery or make important personal/business decisions for 24 hours.      POSSIBLE STEROID SIDE EFFECTS (If steroid/cortisone was used for your procedure)    -If you experience these symptoms, it should only last for a short period    Swelling of the legs              Skin redness (flushing)     Mouth (oral) irritation   Blood sugar (glucose) levels            Sweats                    Mood changes  Headache  Sleeplessness  Weakened immune system for up to 14 days, which could increase the risk of  joão the COVID-19 virus and/or experiencing more severe symptoms of the disease, if exposed.  Decreased effectiveness of the flu vaccine if given within 2 weeks of the steroid.         POSSIBLE PROCEDURE SIDE EFFECTS  -Call the Spine Center if you are concerned  Increased Pain           Increased numbness/tingling      Nausea/Vomiting          Bruising/bleeding at site      Redness or swelling                                              Difficulty walking      Weakness           Fever greater than 100.5    *In the event of a severe headache after an epidural steroid injection that is relieved by lying down, please call the [facility] to speak with a clinical staff member*

## 2022-11-16 ENCOUNTER — TELEPHONE (OUTPATIENT)
Dept: PHYSICAL MEDICINE AND REHAB | Facility: CLINIC | Age: 82
End: 2022-11-16

## 2022-11-16 NOTE — TELEPHONE ENCOUNTER
"PSP: Miley Burroughs PA-C    Last clinic visit:  11/10/22 OV; 11/15/22 Left L4-L5 TFESI   Reason for call: Speak with PSP about injection  Clinical information:  Reports she had her injection yesterday and is \"95% better since the injection. Almost immediately.\" She is wondering how long she has to take Gabapentin or if she can come off of the it as she is not sure it has been helping her at all. She is currently taking 300 mg TID. Reports she has noted constipation since starting the medication. Has not had any Hydrocodone since 11/12. Has added a stool softener to her morning pills.  Advice given to patient: Did explain that constipation could be from the Gabapentin although it is not common. She denies any other side effects of Gabapentin. Explained PSP will be updated on status and request to come off the Gabapentin; did explain that this medication patients are usually on for 2-3 months and then taper off she will be called back with PSP's response; may be tomorrow as she is not in clinic today. Stated understanding.   "

## 2022-11-16 NOTE — TELEPHONE ENCOUNTER
She can wean off of the medication by decreasing her dose by 1 cap every other day.  As long as pain does not worsen she can wean off completely.  If pain worsens at a lower dose she can resume lowest possible dose, as long as she tolerates it.

## 2022-11-16 NOTE — TELEPHONE ENCOUNTER
Phone call to patient to discuss. She does states she thinks she will stay on the Gabapentin for a while now. She does note some weakness still in her leg and a bit of pain in the left buttock during her walk today.     Encouraged pt to call nurse navigation line when/if she decides she would like to wean off and directions can be discussed then. Stated understanding and appreciation for call back.

## 2022-11-17 ENCOUNTER — HOSPITAL ENCOUNTER (OUTPATIENT)
Dept: PHYSICAL THERAPY | Facility: REHABILITATION | Age: 82
Discharge: HOME | End: 2022-11-17
Payer: COMMERCIAL

## 2022-11-17 DIAGNOSIS — M54.16 LUMBAR RADICULITIS: ICD-10-CM

## 2022-11-17 DIAGNOSIS — M51.26 LUMBAR DISC HERNIATION: Primary | ICD-10-CM

## 2022-11-17 PROCEDURE — 97110 THERAPEUTIC EXERCISES: CPT | Mod: GP

## 2022-11-17 PROCEDURE — 97116 GAIT TRAINING THERAPY: CPT | Mod: GP

## 2022-11-17 PROCEDURE — 97140 MANUAL THERAPY 1/> REGIONS: CPT | Mod: GP

## 2022-11-17 NOTE — PROGRESS NOTES
"Daily Note    Subj: Corazon reports that she had a steroid injection, and that almost completely resolved her symptoms for the first day. She continued to see significant symptom improvement for the next several days until last night when she woke up with significantly increased left hip/buttocks pain. She thinks it might be because she has been notable more active since then injection since she has felt so much better. She has had several more falls since last visit when trying to back up, but she does not report any injuries of note.    Assessment/Plan: Patient reports largely improved symptoms since her recent injection, though last night her pain increased notably which she attributes to increased activity/ambulation. She presents to PT today with tri-point cane reporting multiple falls since last visit, primarily when trying to walk backward or back up. She resisted PT recommendation to use FWW due to her negative perception of using a walker. Gait training with tri-point cane was performed for increased safety and independence with functional mobility. She demonstrates slightly decreased left hip flexion strength, but this could be due to increased pain today. She continues to report significant symptom relief with manual therapy. Patient is traveling to [place] for several months now, and will be discharged accordingly. PT instructed patient to contact referring provider if her symptoms worsen or do not improve. She said that she will have a virtual follow-up with her referring provider soon.    Date 11/17/22 11/7   Exercise     Seated n glides  Already doing   Nu step 5 minutes on level 5 5' L3   SKTC and mod piriformis stretch  30\" holds x2-3 B   Standing femoral n glides  x10 on L (started to inc symptoms so it was terminated)   Lower trunk rotation 2 minutes                                         Albert Cota, PT, DPT  "

## 2022-11-17 NOTE — PROGRESS NOTES
[facility] Rehabilitation Service    Outpatient Physical Therapy Discharge Note  Patient: Corazon Hester  : 1940    Beginning/End Dates of Reporting Period:  22 to 22    Referring Provider: Miley Burroughs PA-C    Therapy Diagnosis: Lumbar radicular pain     Client Self Report: Corazon reports that she had a steroid injection, and that almost completely resolved her symptoms for the first day. She continued to see significant symptom improvement for the next several days until last night when she woke up with significantly increased left hip/buttocks pain. She thinks it might be because she has been notable more active since then injection since she has felt so much better. She has had several more falls since last visit when trying to back up, but she does not report any injuries of note.    Objective Measurements:  Objective Measure: Lumbar Extension AROM  Details: Not measured  Objective Measure: Lumbar Side-bending AROM  Details: Not measured  Objective Measure: Hip Flexion Strength  Details: Right: 5/5. Left: 4-/5.  Objective Measure: Knee Extension Strength  Details: Right: 5/5. Left: 4+/5.  Objective Measure: Great Toe Extension Strength  Details: Right: 5/5. Left: slightly limited compared to right.  Objective Measure: tender L glute  Details: Not measured      Outcome Measures (most recent score):  DARIAN: 44%    Goals:  Goal Identifier HEP   Goal Description Corazon will demonstrate mastery of and compliance with home exercise program at least 4/7 days a week to facilitate improved recovery.   Target Date 22   Date Met      Progress (detail required for progress note): In progress     Goal Identifier DARIAN   Goal Description Corazon will demonstrate improved function as evidenced by an improved (decreased) DARIAN score of less than 5%.   Target Date 23   Date Met      Progress (detail required for  progress note): 44%     Goal Identifier Sleep   Goal Description Corazon will be able to sleep through the night without disturbance due to back and leg pain on at least 5/7 nights a week.   Target Date 01/11/23   Date Met      Progress (detail required for progress note): 3-4/7     Goal Identifier Left Lower Extremity Strength   Goal Description Corazon will demonstrate improved left leg lower extremity strength as evidenced by 5/5 MMT for all measured muscle groups.   Target Date 01/11/23   Date Met      Progress (detail required for progress note): In progress       Plan:  Discharge from therapy.    Discharge:    Reason for Discharge: Patient is traveling for several months and cancelled the remainder of her appointments.    Equipment Issued: NA    Discharge Plan: Patient to continue home program.  Other services: reach out to referring provider if symptoms continue or worsen.

## 2022-11-28 ENCOUNTER — TELEPHONE (OUTPATIENT)
Dept: PHYSICAL MEDICINE AND REHAB | Facility: CLINIC | Age: 82
End: 2022-11-28

## 2022-11-28 DIAGNOSIS — M54.16 LUMBAR RADICULITIS: Primary | ICD-10-CM

## 2022-11-28 NOTE — TELEPHONE ENCOUNTER
Thanks for the update.  The only other thing that I thought she could try would be tizanidine 2 mg tid prn instead of the cyclobenzaprine. Otherwise I am happy to see her when she returns to [place] to evaluate and discuss other treatment options.

## 2022-11-28 NOTE — TELEPHONE ENCOUNTER
"PSP:  Miley Burroughs PA-C   Last clinic visit: 11/10/22 OV; 11/15/22 Left L4-L5 TFESI  Reason for call: Follow up to her injection; thought she was having a telephone appointment with PSP. See phone note on 11/16/22  Clinical information:  Explained that as she is in [place] PSP can not have an appointment with her. Patient reports \"It's an up and down deal. Sometimes I feel really good, but it can change in a blink of an eye. The left buttock pain will just hit me. It is on the back portion of the left buttock and around to the outside portion of it too.\" Rates pain 0/10 at best and 9/10 at worst. States she can go 2-3 days without needing Ibuprofen. Does take 1-2 tablets Advil at a time. Does not use Tylenol as she has not found it to provide relief. Has not had to use the Hydrocodone she was given since before the injection. Does not feel the Cyclobenzaprine does anything for her. Has used ice and heat.   Reports she is weaning off of the Gabapentin as she has not since being on it, that she has a hard time making it to the bathroom in time to void; this only occurs during the night. She is aware she has to go, but doesn't always get there in time. Had been taking 3 capsules TID and started on Saturday to decrease by a pill every 2 days.  She reports she is doing her stretches and exercises daily.     Advice given to patient: PSP will be given update.   Provider to address: See above. Patient would like a call back from care navigation after your review.     ~Patient in [place] for 4 months now. Explained that should she need to see someone down there regarding this issue, she can call our medical records department or fax a signed RICARDO to them. Contact information provided.     "

## 2022-11-29 RX ORDER — TIZANIDINE 2 MG/1
2 TABLET ORAL 3 TIMES DAILY PRN
Qty: 90 TABLET | Refills: 1 | Status: SHIPPED | OUTPATIENT
Start: 2022-11-29 | End: 2024-01-25

## 2022-11-29 NOTE — TELEPHONE ENCOUNTER
Pt left voicemail returning call. Call placed to pt. Pt would like to try the Tizanidine. Added [place] pharmacy to her medication list.

## 2023-03-14 ENCOUNTER — TRANSFERRED RECORDS (OUTPATIENT)
Dept: FAMILY MEDICINE | Facility: CLINIC | Age: 83
End: 2023-03-14

## 2023-04-27 ENCOUNTER — TELEPHONE (OUTPATIENT)
Dept: FAMILY MEDICINE | Facility: CLINIC | Age: 83
End: 2023-04-27

## 2023-04-27 DIAGNOSIS — I10 PRIMARY HYPERTENSION: Primary | ICD-10-CM

## 2023-04-27 RX ORDER — AMLODIPINE BESYLATE 10 MG/1
10 TABLET ORAL DAILY
Qty: 30 TABLET | Refills: 0 | Status: SHIPPED | OUTPATIENT
Start: 2023-04-27 | End: 2023-05-12

## 2023-04-27 NOTE — TELEPHONE ENCOUNTER
Request    Name of form/paperwork: Patient came into clinic and dropped off paperwork from her [place] provider in [place]. Patient reported that her amlodipin was increased from 5mg to 10 mg daily.  Patient only has 1 5mg tablet left.  Patient took todays dose but does not have enough medication for tomorrow dose.  Patient has appt with Dr. Gallagher on 5/12/23. Patient requesting medication for all days prior to her appointment.    Patient would like medication sent to [facility] in [facility]    Do we have form/letter: letter from out of state provider and summery from her out of state provider placed in Dr. Gallagher mailbox for review. Copy was sent to HIM on 4/27/23      Okay to leave a detailed message? Patient would like to be called once prescription is sent to pharmacy. It is ok to leave detailed message.

## 2023-04-27 NOTE — TELEPHONE ENCOUNTER
Please inform pt that I ordered one month medication to her pharmacy. Will see her at 5/12 as scheduled.     Blanca Gallagher MD on 4/27/2023 at 2:57 PM'

## 2023-05-12 ENCOUNTER — OFFICE VISIT (OUTPATIENT)
Dept: FAMILY MEDICINE | Facility: CLINIC | Age: 83
End: 2023-05-12
Payer: COMMERCIAL

## 2023-05-12 VITALS
RESPIRATION RATE: 14 BRPM | SYSTOLIC BLOOD PRESSURE: 112 MMHG | HEIGHT: 59 IN | TEMPERATURE: 98.1 F | BODY MASS INDEX: 22.24 KG/M2 | HEART RATE: 68 BPM | WEIGHT: 110.3 LBS | DIASTOLIC BLOOD PRESSURE: 68 MMHG | OXYGEN SATURATION: 96 %

## 2023-05-12 DIAGNOSIS — I10 PRIMARY HYPERTENSION: Primary | ICD-10-CM

## 2023-05-12 PROBLEM — G56.03 BILATERAL CARPAL TUNNEL SYNDROME: Status: RESOLVED | Noted: 2017-05-02 | Resolved: 2023-05-12

## 2023-05-12 LAB
ALBUMIN SERPL BCG-MCNC: 4 G/DL (ref 3.5–5.2)
ALP SERPL-CCNC: 132 U/L (ref 35–104)
ALT SERPL W P-5'-P-CCNC: 15 U/L (ref 10–35)
ANION GAP SERPL CALCULATED.3IONS-SCNC: 10 MMOL/L (ref 7–15)
AST SERPL W P-5'-P-CCNC: 21 U/L (ref 10–35)
BILIRUB SERPL-MCNC: 0.8 MG/DL
BUN SERPL-MCNC: 9.3 MG/DL (ref 8–23)
CALCIUM SERPL-MCNC: 9.3 MG/DL (ref 8.8–10.2)
CHLORIDE SERPL-SCNC: 104 MMOL/L (ref 98–107)
CHOLEST SERPL-MCNC: 243 MG/DL
CREAT SERPL-MCNC: 0.71 MG/DL (ref 0.51–0.95)
DEPRECATED HCO3 PLAS-SCNC: 26 MMOL/L (ref 22–29)
GFR SERPL CREATININE-BSD FRML MDRD: 84 ML/MIN/1.73M2
GLUCOSE SERPL-MCNC: 95 MG/DL (ref 70–99)
HDLC SERPL-MCNC: 131 MG/DL
LDLC SERPL CALC-MCNC: 104 MG/DL
NONHDLC SERPL-MCNC: 112 MG/DL
POTASSIUM SERPL-SCNC: 4.6 MMOL/L (ref 3.4–5.3)
PROT SERPL-MCNC: 7 G/DL (ref 6.4–8.3)
SODIUM SERPL-SCNC: 140 MMOL/L (ref 136–145)
TRIGL SERPL-MCNC: 40 MG/DL
TSH SERPL DL<=0.005 MIU/L-ACNC: 2.01 UIU/ML (ref 0.3–4.2)

## 2023-05-12 PROCEDURE — 36415 COLL VENOUS BLD VENIPUNCTURE: CPT | Performed by: FAMILY MEDICINE

## 2023-05-12 PROCEDURE — 99213 OFFICE O/P EST LOW 20 MIN: CPT | Performed by: FAMILY MEDICINE

## 2023-05-12 PROCEDURE — 80053 COMPREHEN METABOLIC PANEL: CPT | Performed by: FAMILY MEDICINE

## 2023-05-12 PROCEDURE — 80061 LIPID PANEL: CPT | Performed by: FAMILY MEDICINE

## 2023-05-12 PROCEDURE — 84443 ASSAY THYROID STIM HORMONE: CPT | Performed by: FAMILY MEDICINE

## 2023-05-12 RX ORDER — AMLODIPINE BESYLATE 10 MG/1
10 TABLET ORAL DAILY
Qty: 90 TABLET | Refills: 3 | Status: SHIPPED | OUTPATIENT
Start: 2023-05-12 | End: 2023-06-28

## 2023-05-12 ASSESSMENT — PAIN SCALES - GENERAL: PAINLEVEL: NO PAIN (0)

## 2023-05-12 NOTE — PROGRESS NOTES
Assessment & Plan     (I10) Primary hypertension  (primary encounter diagnosis)  Comment: bp well controlled. bp was high when she was at [place], and amlodipine was increased from 5 to 10 mg. She check bp occasionally at [facility]. Denies cp, sob, palpitation, headache and blurry vision.   Plan: amLODIPine (NORVASC) 10 MG tablet,         Comprehensive metabolic panel (BMP + Alb, Alk         Phos, ALT, AST, Total. Bili, TP), Lipid panel         reflex to direct LDL Fasting, TSH with free T4         reflex        Continue current medication. Advise to check bp at home regular. If bp is < 100/60 she will hold medication. Follow-up in 6 month. Pt declined medicare wellness, declined bone density scan..   [id number]     Nicotine/Tobacco Cessation:  She reports that she has been smoking cigarettes. She has a 12.50 pack-year smoking history. She has been exposed to tobacco smoke. She has never used smokeless tobacco.  Nicotine/Tobacco Cessation Plan:   Information offered: Patient not interested at this time      See Patient Instructions    Blanca Gallagher MD  [facility]   Corazon is a 82 year old, presenting for the following health issues:  Hypertension (BP check. Pt does not check BP at home. Pt denies having any headaches or lightheadedness. )        5/12/2023    10:09 AM   Additional Questions   Roomed by Erica Luevano     History of Present Illness       Hypertension: She presents for follow up of hypertension.  She does not check blood pressure  regularly outside of the clinic. Outside blood pressures have been over 140/90. She follows a low salt diet.     She eats 4 or more servings of fruits and vegetables daily.She consumes 1 sweetened beverage(s) daily.She exercises with enough effort to increase her heart rate 30 to 60 minutes per day.  She exercises with enough effort to increase her heart rate 4 days per week.   She is taking medications regularly.               Review of  "Systems   Constitutional, HEENT, cardiovascular, pulmonary, gi and gu systems are negative, except as otherwise noted.      Objective    /68 (BP Location: Left arm, Patient Position: Sitting, Cuff Size: Adult Regular)   Pulse 68   Temp 98.1  F (36.7  C) (Oral)   Resp 14   Ht 1.505 m (4' 11.25\")   Wt 50 kg (110 lb 4.8 oz)   LMP  (LMP Unknown)   SpO2 96%   Breastfeeding No   BMI 22.09 kg/m    Body mass index is 22.09 kg/m .  Physical Exam   GENERAL: healthy, alert and no distress  HENT: ear canals and TM's normal, nose and mouth without ulcers or lesions  NECK: no adenopathy, no asymmetry, masses, or scars and thyroid normal to palpation  RESP: lungs clear to auscultation - no rales, rhonchi or wheezes  CV: regular rate and rhythm, normal S1 S2, no S3 or S4, no murmur, click or rub, no peripheral edema and peripheral pulses strong  ABDOMEN: soft, nontender, no hepatosplenomegaly, no masses and bowel sounds normal  MS: no gross musculoskeletal defects noted, no edema                "

## 2023-05-15 ENCOUNTER — TELEPHONE (OUTPATIENT)
Dept: FAMILY MEDICINE | Facility: CLINIC | Age: 83
End: 2023-05-15
Payer: COMMERCIAL

## 2023-05-15 NOTE — LETTER
May 23, 2023      Corazon Hester  [address] [place] [place] [address]        Dear ,    We are writing to inform you of your test results and have been unable to reach you by phone.    Test results indicate you may require additional follow up, see comment below.    Resulted Orders   Comprehensive metabolic panel (BMP + Alb, Alk Phos, ALT, AST, Total. Bili, TP)   Result Value Ref Range    Sodium 140 136 - 145 mmol/L    Potassium 4.6 3.4 - 5.3 mmol/L    Chloride 104 98 - 107 mmol/L    Carbon Dioxide (CO2) 26 22 - 29 mmol/L    Anion Gap 10 7 - 15 mmol/L    Urea Nitrogen 9.3 8.0 - 23.0 mg/dL    Creatinine 0.71 0.51 - 0.95 mg/dL    Calcium 9.3 8.8 - 10.2 mg/dL    Glucose 95 70 - 99 mg/dL    Alkaline Phosphatase 132 (H) 35 - 104 U/L    AST 21 10 - 35 U/L    ALT 15 10 - 35 U/L    Protein Total 7.0 6.4 - 8.3 g/dL    Albumin 4.0 3.5 - 5.2 g/dL    Bilirubin Total 0.8 <=1.2 mg/dL    GFR Estimate 84 >60 mL/min/1.73m2      Comment:      eGFR calculated using 2021 CKD-EPI equation.   Lipid panel reflex to direct LDL Fasting   Result Value Ref Range    Cholesterol 243 (H) <200 mg/dL    Triglycerides 40 <150 mg/dL    Direct Measure  >=50 mg/dL    LDL Cholesterol Calculated 104 (H) <=100 mg/dL    Non HDL Cholesterol 112 <130 mg/dL    Narrative    Cholesterol  Desirable:  <200 mg/dL    Triglycerides  Normal:  Less than 150 mg/dL  Borderline High:  150-199 mg/dL  High:  200-499 mg/dL  Very High:  Greater than or equal to 500 mg/dL    Direct Measure HDL  Female:  Greater than or equal to 50 mg/dL   Male:  Greater than or equal to 40 mg/dL    LDL Cholesterol  Desirable:  <100mg/dL  Above Desirable:  100-129 mg/dL   Borderline High:  130-159 mg/dL   High:  160-189 mg/dL   Very High:  >= 190 mg/dL    Non HDL Cholesterol  Desirable:  130 mg/dL  Above Desirable:  130-159 mg/dL  Borderline High:  160-189 mg/dL  High:  190-219 mg/dL  Very High:  Greater than or equal to 220 mg/dL   TSH with free T4 reflex    Result Value Ref Range    TSH 2.01 0.30 - 4.20 uIU/mL       Please schedule an office follow-up visit in one month.      1) normal thyroid, kidney function. Normal blood glucose and electrolytes.   2) elevated alkaline phosphatase, unknown etiology.  otherwise liver function is satisfied. Advise to follow-up in one month to recheck.   3) mild elevated LDL that is bad cholesterol. High HDL that is good cholesterol.       If you have any questions or concerns, please call the clinic at the number listed above.       Sincerely,    [facility]  Phone [phone]  Fax [phone]

## 2023-05-15 NOTE — TELEPHONE ENCOUNTER
----- Message from Blanca Gallagher MD sent at 5/15/2023  8:52 AM CDT -----  Please call pt for the results and help her to make a office follow-up visit in one month.     1) normal thyroid, kidney function. Normal blood glucose and electrolytes.   2) elevated alkaline phosphatase, unknown etiology.  otherwise liver function is satisfied. Advise to follow-up in one month to recheck.   3) mild elevated LDL that is bad cholesterol. High HDL that is good cholesterol.     Blanca Gallagher MD on 5/15/2023 at 8:50 AM;

## 2023-05-27 NOTE — TELEPHONE ENCOUNTER
Pt calling back.  Informed patient to check mail and to call if there are any questions.     Shweta Russell/Kelli-Central Scheduler

## 2023-06-06 ENCOUNTER — OFFICE VISIT (OUTPATIENT)
Dept: NEUROSURGERY | Facility: CLINIC | Age: 83
End: 2023-06-06
Payer: COMMERCIAL

## 2023-06-06 VITALS
DIASTOLIC BLOOD PRESSURE: 81 MMHG | HEIGHT: 60 IN | WEIGHT: 113 LBS | OXYGEN SATURATION: 98 % | BODY MASS INDEX: 22.19 KG/M2 | HEART RATE: 67 BPM | SYSTOLIC BLOOD PRESSURE: 127 MMHG

## 2023-06-06 DIAGNOSIS — M48.061 LUMBAR FORAMINAL STENOSIS: ICD-10-CM

## 2023-06-06 DIAGNOSIS — M41.25 OTHER IDIOPATHIC SCOLIOSIS, THORACOLUMBAR REGION: ICD-10-CM

## 2023-06-06 DIAGNOSIS — M79.18 LEFT BUTTOCK PAIN: ICD-10-CM

## 2023-06-06 DIAGNOSIS — M51.369 LUMBAR DEGENERATIVE DISC DISEASE: ICD-10-CM

## 2023-06-06 DIAGNOSIS — M54.16 LEFT LUMBAR RADICULOPATHY: Primary | ICD-10-CM

## 2023-06-06 PROCEDURE — 99214 OFFICE O/P EST MOD 30 MIN: CPT | Performed by: NURSE PRACTITIONER

## 2023-06-06 ASSESSMENT — PAIN SCALES - GENERAL: PAINLEVEL: NO PAIN (0)

## 2023-06-06 NOTE — PROGRESS NOTES
Assessment:     Diagnoses and all orders for this visit:  Left lumbar radiculopathy  Left buttock pain  Lumbar foraminal stenosis  Lumbar degenerative disc disease  Other idiopathic scoliosis, thoracolumbar region     Corazon Hester is a 82 year old y.o. female with past medical history significant for GERD, history of duodenal ulcer hypertension, tobacco abuse who presents today for follow-up regarding:    -Chronic left buttock pain with previous left lumbar radiculopathy with benefit with left L4-5 TFESI.    *ESTELA Shetty primary spine provider.     Plan:     A shared decision making plan was used. The patient's values and choices were respected. Prior medical records were reviewed today. The following represents what was discussed and decided upon by the provider and the patient.        -DIAGNOSTIC TESTS: Images were personally reviewed and interpreted.   -- Lumbar spine MRI 11/1/2022 with thoracolumbar levoscoliosis with spondylolisthesis at L4-5.  L4-5 moderate to severe left foraminal stenosis.  L3-4 severe left lateral recess and moderate to severe left foraminal stenosis, mild right.  L2-3 mild right lateral recess and mild to moderate right foraminal stenosis.  Mild bilateral foraminal stenosis L5-S1.    -INTERVENTIONS: No need for further injections at this time as she is doing well.  Reassured patient that as long as she is doing well we do not need to do injections but this could be an option of repeating left L4-5 TFESI if her pain flares again in the future.    -MEDICATIONS: No changes in medications can continue with tizanidine as needed.    Discussed side effects of medications and proper use. Patient verbalized understanding.    -PHYSICAL THERAPY: Did encourage patient to continue with home exercises from prior physical therapy sessions which she is diligent about doing on a regular basis at this time and is doing well with them.  Discussed the importance of core strengthening, ROM,  stretching exercises with the patient and how each of these entities is important in decreasing pain.  Explained to the patient that the purpose of physical therapy is to teach the patient a home exercise program.  These exercises need to be performed every day in order to decrease pain and prevent future occurrences of pain.        -PATIENT EDUCATION:  Total time of 32 minutes, on the day of service, spent with the patient, reviewing the chart, placing orders, and documenting.   -Today we also discussed the issues related to the current COVID-19 pandemic, the pros and cons of the current treatment plan, the CDC guidelines such as social distancing, washing the hands, and covering the cough.    -FOLLOW UP: Follow-up as needed  Advised to contact clinic if symptoms worsen or change.    Subjective:     Corazon Hester is a 82 year old female who presents today for follow-up regarding previously significant left buttock pain and left lower extremity pain which she saw Miley for consultation 10/31/2022, she did get significant relief with her leg pain with the injection that was completed in November.  Was still having some buttock pain but then when she was in [place] over the winter she did get a trigger injection with lidocaine only she reports which gave her significant relief of her gluteal pain and she is doing quite well still at this time.  Currently her pain is a 0/10, 1 at its worst into the gluteal region only.  She denies any leg pain.  Denies any lower extremity weakness at this time.  Denies any recent trips or falls or balance changes.  Denies bowel or bladder loss control.    Patient is following up as she had multiple questions about the MRI she had previously and if she should be concerned about future issues arising.    Treatment to Date: No prior spinal surgery   Physical therapy lumbar radiculopathy 2022    Left L4-5 TFESI 11/15/2022 with relief of lumbar radiculopathy, less relief with buttock pain  alone.  Trigger injection [place] Gluteal injection with significant relief buttock pain, no steroid per patient.     Medications:  Tizanidine 2 mg with benefit, takes occasionally    Cyclobenzaprine with no benefit  Gabapentin 200 mg with no benefit, no longer taking  Past Medrol Dosepak  Hydrocodone in the past prescribed by Miley 11/10/2022,    Patient Active Problem List   Diagnosis     Advanced directives, counseling/discussion     Tobacco abuse       Current Outpatient Medications   Medication     Ibuprofen (ADVIL PO)     tiZANidine (ZANAFLEX) 2 MG tablet     amLODIPine (NORVASC) 10 MG tablet     aspirin (ASA) 81 MG EC tablet     BENADRYL 25 MG OR CAPS     CENTRUM SILVER OR TABS     FISH OIL 1000 MG OR CAPS     Lactobacillus (PROBIOTIC ACIDOPHILUS PO)     latanoprost (XALATAN) 0.005 % ophthalmic solution     METAMUCIL OR     multivitamin (OCUVITE) TABS     VITAMIN C 500 MG OR TABS     No current facility-administered medications for this visit.       Allergies   Allergen Reactions     Calcium GI Disturbance     Tetracycline Diarrhea       Past Medical History:   Diagnosis Date     Duodenal ulcer, unspecified as acute or chronic, without hemorrhage, perforation, or obstruction 1950s?     GERD (gastroesophageal reflux disease)     Described as belching     HTN (hypertension) 2009     Tobacco abuse         Review of Systems  ROS:  Specifically negative for bowel/bladder dysfunction, balance changes, headache, dizziness, foot drop, fevers, chills, appetite changes, nausea/vomiting, unexplained weight loss. Otherwise 13 systems reviewed are negative. Please see the patient's intake questionnaire from today for details.    Reviewed Social, Family, Past Medical and Past Surgical history with patient, no significant changes noted since prior visit.     Objective:     /81 (BP Location: Right arm, Patient Position: Sitting)   Pulse 67   Ht 5' (1.524 m)   Wt 113 lb (51.3 kg)   LMP  (LMP Unknown)   SpO2 98%   BMI  22.07 kg/m      PHYSICAL EXAMINATION:    --CONSTITUTIONAL: Well developed, well nourished, healthy appearing individual.  --PSYCHIATRIC: Appropriate mood and affect. No difficulty interacting due to temper, social withdrawal, or memory issues.  --SKIN: Lumbar region is dry and intact.   --RESPIRATORY: Normal rhythm and effort. No abnormal accessory muscle breathing patterns noted.   --MUSCULOSKELETAL:  Normal lumbar lordosis noted, no lateral shift.  --GROSS MOTOR: Easily arises from a seated position. Gait is non-antalgic  --LUMBAR SPINE:  Inspection reveals no evidence of deformity.   --LOWER EXTREMITY MOTOR TESTING:  Plantar flexion left 5/5, right 5/5   Dorsiflexion left 5/5, right 5/5   Great toe MTP extension left 5/5, right 5/5  Knee flexion left 5/5, right 5/5  Knee extension left 5/5, right 5/5   Hip flexion left 5/5, right 5/5  Hip abduction left 5/5, right 5/5  Hip adduction left 5/5, right 5/5   --HIPS: Full range of motion bilaterally.   --NEUROLOGIC: Bilateral patellar and achilles reflexes are physiologic and symmetric. Sensation to light touch is intact in the bilateral L4, L5, and S1 dermatomes.    RESULTS:   Imaging: Spine imaging was reviewed today. The images were shown to the patient and the findings were explained using a spine model.      Lumbar spine MRI reviewed with patient today as she did multiple questions.

## 2023-06-06 NOTE — LETTER
6/6/2023         RE: Corazon Hester  [address] [facility] [place] [address]        Dear Colleague,    Thank you for referring your patient, Corazon Hester, to the [facility]. Please see a copy of my visit note below.      Assessment:     Diagnoses and all orders for this visit:  Left lumbar radiculopathy  Left buttock pain  Lumbar foraminal stenosis  Lumbar degenerative disc disease  Other idiopathic scoliosis, thoracolumbar region     Corazon Hester is a 82 year old y.o. female with past medical history significant for GERD, history of duodenal ulcer hypertension, tobacco abuse who presents today for follow-up regarding:    -Chronic left buttock pain with previous left lumbar radiculopathy with benefit with left L4-5 TFESI.    *ESTELA Shetty primary spine provider.     Plan:     A shared decision making plan was used. The patient's values and choices were respected. Prior medical records were reviewed today. The following represents what was discussed and decided upon by the provider and the patient.        -DIAGNOSTIC TESTS: Images were personally reviewed and interpreted.   -- Lumbar spine MRI 11/1/2022 with thoracolumbar levoscoliosis with spondylolisthesis at L4-5.  L4-5 moderate to severe left foraminal stenosis.  L3-4 severe left lateral recess and moderate to severe left foraminal stenosis, mild right.  L2-3 mild right lateral recess and mild to moderate right foraminal stenosis.  Mild bilateral foraminal stenosis L5-S1.    -INTERVENTIONS: No need for further injections at this time as she is doing well.  Reassured patient that as long as she is doing well we do not need to do injections but this could be an option of repeating left L4-5 TFESI if her pain flares again in the future.    -MEDICATIONS: No changes in medications can continue with tizanidine as needed.    Discussed side effects of medications and proper use. Patient verbalized  understanding.    -PHYSICAL THERAPY: Did encourage patient to continue with home exercises from prior physical therapy sessions which she is diligent about doing on a regular basis at this time and is doing well with them.  Discussed the importance of core strengthening, ROM, stretching exercises with the patient and how each of these entities is important in decreasing pain.  Explained to the patient that the purpose of physical therapy is to teach the patient a home exercise program.  These exercises need to be performed every day in order to decrease pain and prevent future occurrences of pain.        -PATIENT EDUCATION:  Total time of 32 minutes, on the day of service, spent with the patient, reviewing the chart, placing orders, and documenting.   -Today we also discussed the issues related to the current COVID-19 pandemic, the pros and cons of the current treatment plan, the CDC guidelines such as social distancing, washing the hands, and covering the cough.    -FOLLOW UP: Follow-up as needed  Advised to contact clinic if symptoms worsen or change.    Subjective:     Corazon Hester is a 82 year old female who presents today for follow-up regarding previously significant left buttock pain and left lower extremity pain which she saw Miley for consultation 10/31/2022, she did get significant relief with her leg pain with the injection that was completed in November.  Was still having some buttock pain but then when she was in [place] over the winter she did get a trigger injection with lidocaine only she reports which gave her significant relief of her gluteal pain and she is doing quite well still at this time.  Currently her pain is a 0/10, 1 at its worst into the gluteal region only.  She denies any leg pain.  Denies any lower extremity weakness at this time.  Denies any recent trips or falls or balance changes.  Denies bowel or bladder loss control.    Patient is following up as she had multiple questions  about the MRI she had previously and if she should be concerned about future issues arising.    Treatment to Date: No prior spinal surgery   Physical therapy lumbar radiculopathy 2022    Left L4-5 TFESI 11/15/2022 with relief of lumbar radiculopathy, less relief with buttock pain alone.  Trigger injection [place] Gluteal injection with significant relief buttock pain, no steroid per patient.     Medications:  Tizanidine 2 mg with benefit, takes occasionally    Cyclobenzaprine with no benefit  Gabapentin 200 mg with no benefit, no longer taking  Past Medrol Dosepak  Hydrocodone in the past prescribed by Miley 11/10/2022,    Patient Active Problem List   Diagnosis     Advanced directives, counseling/discussion     Tobacco abuse       Current Outpatient Medications   Medication     Ibuprofen (ADVIL PO)     tiZANidine (ZANAFLEX) 2 MG tablet     amLODIPine (NORVASC) 10 MG tablet     aspirin (ASA) 81 MG EC tablet     BENADRYL 25 MG OR CAPS     CENTRUM SILVER OR TABS     FISH OIL 1000 MG OR CAPS     Lactobacillus (PROBIOTIC ACIDOPHILUS PO)     latanoprost (XALATAN) 0.005 % ophthalmic solution     METAMUCIL OR     multivitamin (OCUVITE) TABS     VITAMIN C 500 MG OR TABS     No current facility-administered medications for this visit.       Allergies   Allergen Reactions     Calcium GI Disturbance     Tetracycline Diarrhea       Past Medical History:   Diagnosis Date     Duodenal ulcer, unspecified as acute or chronic, without hemorrhage, perforation, or obstruction 1950s?     GERD (gastroesophageal reflux disease)     Described as belching     HTN (hypertension) 2009     Tobacco abuse         Review of Systems  ROS:  Specifically negative for bowel/bladder dysfunction, balance changes, headache, dizziness, foot drop, fevers, chills, appetite changes, nausea/vomiting, unexplained weight loss. Otherwise 13 systems reviewed are negative. Please see the patient's intake questionnaire from today for details.    Reviewed Social,  Family, Past Medical and Past Surgical history with patient, no significant changes noted since prior visit.     Objective:     /81 (BP Location: Right arm, Patient Position: Sitting)   Pulse 67   Ht 5' (1.524 m)   Wt 113 lb (51.3 kg)   LMP  (LMP Unknown)   SpO2 98%   BMI 22.07 kg/m      PHYSICAL EXAMINATION:    --CONSTITUTIONAL: Well developed, well nourished, healthy appearing individual.  --PSYCHIATRIC: Appropriate mood and affect. No difficulty interacting due to temper, social withdrawal, or memory issues.  --SKIN: Lumbar region is dry and intact.   --RESPIRATORY: Normal rhythm and effort. No abnormal accessory muscle breathing patterns noted.   --MUSCULOSKELETAL:  Normal lumbar lordosis noted, no lateral shift.  --GROSS MOTOR: Easily arises from a seated position. Gait is non-antalgic  --LUMBAR SPINE:  Inspection reveals no evidence of deformity.   --LOWER EXTREMITY MOTOR TESTING:  Plantar flexion left 5/5, right 5/5   Dorsiflexion left 5/5, right 5/5   Great toe MTP extension left 5/5, right 5/5  Knee flexion left 5/5, right 5/5  Knee extension left 5/5, right 5/5   Hip flexion left 5/5, right 5/5  Hip abduction left 5/5, right 5/5  Hip adduction left 5/5, right 5/5   --HIPS: Full range of motion bilaterally.   --NEUROLOGIC: Bilateral patellar and achilles reflexes are physiologic and symmetric. Sensation to light touch is intact in the bilateral L4, L5, and S1 dermatomes.    RESULTS:   Imaging: Spine imaging was reviewed today. The images were shown to the patient and the findings were explained using a spine model.      Lumbar spine MRI reviewed with patient today as she did multiple questions.                      Again, thank you for allowing me to participate in the care of your patient.        Sincerely,        Brittney Pettit, CNP

## 2023-06-06 NOTE — PATIENT INSTRUCTIONS
~Please call our [facility] Nurse Navigation line [phone] with any questions or concerns about your treatment plan, if symptoms worsen and you would like to be seen urgently, or if you have problems controlling bladder and bowel function.  ~Please note that any My Chart messages may take multiple days for a response due to the high volume of patients seen in clinic.   Anything sent Thursday night or after will be answered the following week when able, as Brittney Pettit CNP does not work in clinic on Fridays.   ~Brittney Pettit CNP is at the [facility] on the first and third Tuesdays of the month only, otherwise primarily at the [facility].        Importance of specialized Physical Therapy: Discussed the importance of core strengthening, ROM, stretching exercises with the patient and how each of these entities is important in decreasing pain.  Explained to the patient that the purpose of physical therapy is to teach the patient a home exercise program individualized to them and their specific health concerns.  These exercises need to be performed every day in order to decrease pain and prevent future occurrences of pain.

## 2023-06-28 ENCOUNTER — OFFICE VISIT (OUTPATIENT)
Dept: FAMILY MEDICINE | Facility: CLINIC | Age: 83
End: 2023-06-28
Payer: COMMERCIAL

## 2023-06-28 VITALS
BODY MASS INDEX: 21.6 KG/M2 | TEMPERATURE: 98.3 F | WEIGHT: 110 LBS | HEIGHT: 60 IN | SYSTOLIC BLOOD PRESSURE: 130 MMHG | DIASTOLIC BLOOD PRESSURE: 80 MMHG | RESPIRATION RATE: 14 BRPM | HEART RATE: 66 BPM | OXYGEN SATURATION: 99 %

## 2023-06-28 DIAGNOSIS — I10 PRIMARY HYPERTENSION: Primary | ICD-10-CM

## 2023-06-28 DIAGNOSIS — R79.89 ABNORMAL LFTS: ICD-10-CM

## 2023-06-28 LAB
ALBUMIN SERPL BCG-MCNC: 4.1 G/DL (ref 3.5–5.2)
ALP SERPL-CCNC: 127 U/L (ref 35–104)
ALT SERPL W P-5'-P-CCNC: 15 U/L (ref 0–50)
AST SERPL W P-5'-P-CCNC: 22 U/L (ref 0–45)
BILIRUB DIRECT SERPL-MCNC: 0.24 MG/DL (ref 0–0.3)
BILIRUB SERPL-MCNC: 0.9 MG/DL
PROT SERPL-MCNC: 7.2 G/DL (ref 6.4–8.3)

## 2023-06-28 PROCEDURE — 80076 HEPATIC FUNCTION PANEL: CPT | Performed by: FAMILY MEDICINE

## 2023-06-28 PROCEDURE — 36415 COLL VENOUS BLD VENIPUNCTURE: CPT | Performed by: FAMILY MEDICINE

## 2023-06-28 PROCEDURE — 99214 OFFICE O/P EST MOD 30 MIN: CPT | Performed by: FAMILY MEDICINE

## 2023-06-28 RX ORDER — AMLODIPINE BESYLATE 5 MG/1
5 TABLET ORAL DAILY
Qty: 90 TABLET | Refills: 3 | Status: SHIPPED | OUTPATIENT
Start: 2023-06-28 | End: 2024-06-20

## 2023-06-28 NOTE — PROGRESS NOTES
Assessment & Plan     (I10) Primary hypertension  (primary encounter diagnosis)  Comment: pt was Rxed amlodipine for years. She noticed of ankle swelling. She decreased the dose to 5 mg by herself and monitor bp at home regularly, bp 110-130/70-80. Ankle swelling resolved after decrease the dose of amlodipine. Denies cp, sob, palpitation, headache and vision change.   Plan: amLODIPine (NORVASC) 5 MG tablet        Will decrease the dose for amlodipine from 10 mg to 5 mg due to bp reasonable control and no side effect with lower dose. Advise continue monitor bp at home regular. If bp is < 100/60 she will hold the medication. If bp is > 140/80 she will follow-up, otherwise follow-up in 6 month. She is due for medicare wellness. She likes to do it at her follow-up visit.     (R79.89) Abnormal LFTs  Comment: we reviewed the lab that she had mild elevated ALK with normal ast/alt and bili, that is new. No history of liver disease and no change of medication. Unknown etiology.   Plan: Hepatic panel (Albumin, ALT, AST, Bili, Alk         Phos, TP)        Will follow-up lab.        Blanca Gallagher MD  [facility]    Baltazar Chandra is a 82 year old, presenting for the following health issues:  RECHECK (Recheck labs - since last visit in May she has been cutting her amlodipine tab in half )        6/28/2023    10:06 AM   Additional Questions   Roomed by Gen MILLY CMA     History of Present Illness       Reason for visit:  Follow up lab test    She eats 4 or more servings of fruits and vegetables daily.She consumes 1 sweetened beverage(s) daily.She exercises with enough effort to increase her heart rate 20 to 29 minutes per day.  She exercises with enough effort to increase her heart rate 4 days per week.   She is taking medications regularly.       Hypertension Follow-up      Do you check your blood pressure regularly outside of the clinic? Yes     Are you following a low salt diet? Yes    Are your  blood pressures ever more than 140 on the top number (systolic) OR more   than 90 on the bottom number (diastolic), for example 140/90? No          Review of Systems   Constitutional, HEENT, cardiovascular, pulmonary, gi and gu systems are negative, except as otherwise noted.      Objective    /80   Pulse 66   Temp 98.3  F (36.8  C) (Oral)   Resp 14   Ht 1.524 m (5')   Wt 49.9 kg (110 lb)   LMP  (LMP Unknown)   SpO2 99%   BMI 21.48 kg/m    Body mass index is 21.48 kg/m .  Physical Exam   GENERAL: healthy, alert and no distress  NECK: no adenopathy, no asymmetry, masses, or scars and thyroid normal to palpation  RESP: lungs clear to auscultation - no rales, rhonchi or wheezes  CV: regular rate and rhythm, normal S1 S2, no S3 or S4, no murmur, click or rub, no peripheral edema and peripheral pulses strong  ABDOMEN: soft, nontender, no hepatosplenomegaly, no masses and bowel sounds normal  MS: no gross musculoskeletal defects noted, no edema

## 2023-06-28 NOTE — LETTER
June 29, 2023      Corazon Hester  [address] [facility] [place] [address]        Dear ,    We are writing to inform you of your test results.    You alkaline phosphatase is mild elevated but improved compared to one month ago, other than that liver function is consider normal. No further evaluation is needed at this moment.     Resulted Orders   Hepatic panel (Albumin, ALT, AST, Bili, Alk Phos, TP)   Result Value Ref Range    Protein Total 7.2 6.4 - 8.3 g/dL    Albumin 4.1 3.5 - 5.2 g/dL    Bilirubin Total 0.9 <=1.2 mg/dL    Alkaline Phosphatase 127 (H) 35 - 104 U/L    AST 22 0 - 45 U/L      Comment:      Reference intervals for this test were updated on 6/12/2023 to more accurately reflect our healthy population. There may be differences in the flagging of prior results with similar values performed with this method. Interpretation of those prior results can be made in the context of the updated reference intervals.    ALT 15 0 - 50 U/L      Comment:      Reference intervals for this test were updated on 6/12/2023 to more accurately reflect our healthy population. There may be differences in the flagging of prior results with similar values performed with this method. Interpretation of those prior results can be made in the context of the updated reference intervals.      Bilirubin Direct 0.24 0.00 - 0.30 mg/dL       If you have any questions or concerns, please call the clinic at the number listed above.       Sincerely,      Blanca Gallagher MD

## 2023-11-01 ENCOUNTER — ALLIED HEALTH/NURSE VISIT (OUTPATIENT)
Dept: FAMILY MEDICINE | Facility: CLINIC | Age: 83
End: 2023-11-01
Payer: COMMERCIAL

## 2023-11-01 DIAGNOSIS — Z23 ENCOUNTER FOR IMMUNIZATION: Primary | ICD-10-CM

## 2023-11-01 DIAGNOSIS — Z29.11 NEED FOR VACCINATION AGAINST RESPIRATORY SYNCYTIAL VIRUS: ICD-10-CM

## 2023-11-01 PROCEDURE — 99207 PR NO CHARGE NURSE ONLY: CPT

## 2023-11-01 PROCEDURE — 90480 ADMN SARSCOV2 VAC 1/ONLY CMP: CPT

## 2023-11-01 PROCEDURE — 91320 SARSCV2 VAC 30MCG TRS-SUC IM: CPT

## 2023-11-01 NOTE — PROGRESS NOTES
Prior to immunization administration, verified patients identity using patient s name and date of birth. Please see Immunization Activity for additional information.     Screening Questionnaire for Adult Immunization    Are you sick today?   No   Do you have allergies to medications, food, a vaccine component or latex?   No   Have you ever had a serious reaction after receiving a vaccination?   No   Do you have a long-term health problem with heart, lung, kidney, or metabolic disease (e.g., diabetes), asthma, a blood disorder, no spleen, complement component deficiency, a cochlear implant, or a spinal fluid leak?  Are you on long-term aspirin therapy?   No   Do you have cancer, leukemia, HIV/AIDS, or any other immune system problem?   No   Do you have a parent, brother, or sister with an immune system problem?   No   In the past 3 months, have you taken medications that affect  your immune system, such as prednisone, other steroids, or anticancer drugs; drugs for the treatment of rheumatoid arthritis, Crohn s disease, or psoriasis; or have you had radiation treatments?   No   Have you had a seizure, or a brain or other nervous system problem?   No   During the past year, have you received a transfusion of blood or blood    products, or been given immune (gamma) globulin or antiviral drug?   No   For women: Are you pregnant or is there a chance you could become       pregnant during the next month?   No   Have you received any vaccinations in the past 4 weeks?   No     Immunization questionnaire answers were all negative.    I have reviewed the following standing orders:   This patient is due and qualifies for the Covid-19 vaccine.     Click here for COVID-19 Standing Order    I have reviewed the vaccines inclusion and exclusion criteria; No concerns regarding eligibility.     Patient instructed to remain in clinic for 15 minutes afterwards, and to report any adverse reactions.     Screening performed by Trini PLASENCIA  ALYSA Mckay on 11/1/2023 at 11:50 AM.

## 2023-11-30 ENCOUNTER — OFFICE VISIT (OUTPATIENT)
Dept: FAMILY MEDICINE | Facility: CLINIC | Age: 83
End: 2023-11-30
Payer: COMMERCIAL

## 2023-11-30 VITALS
HEIGHT: 60 IN | OXYGEN SATURATION: 99 % | SYSTOLIC BLOOD PRESSURE: 128 MMHG | HEART RATE: 72 BPM | WEIGHT: 113 LBS | DIASTOLIC BLOOD PRESSURE: 76 MMHG | RESPIRATION RATE: 16 BRPM | BODY MASS INDEX: 22.19 KG/M2 | TEMPERATURE: 98.9 F

## 2023-11-30 DIAGNOSIS — I10 PRIMARY HYPERTENSION: Primary | ICD-10-CM

## 2023-11-30 PROCEDURE — 99213 OFFICE O/P EST LOW 20 MIN: CPT | Performed by: FAMILY MEDICINE

## 2023-11-30 RX ORDER — RESPIRATORY SYNCYTIAL VIRUS VACCINE 120MCG/0.5
0.5 KIT INTRAMUSCULAR ONCE
Qty: 1 EACH | Refills: 0 | Status: CANCELLED | OUTPATIENT
Start: 2023-11-30 | End: 2023-11-30

## 2023-11-30 NOTE — PROGRESS NOTES
Assessment & Plan     (I10) Primary hypertension  (primary encounter diagnosis)  Comment: bp reasonable control. Pt takes the medication as instructed and no side effect. She checks bp at pharmacy and usually 120-130/70-80.   Plan: continue current medication. Close monitor bp at home. If bp is < 100/60 she will hold the medication.           See Patient Instructions    Blanca Gallagher MD  [facility] YULI Chandra is a 83 year old, presenting for the following health issues:  No chief complaint on file.      11/30/2023     1:19 PM   Additional Questions   Roomed by Gen MILLY CMA       History of Present Illness       Hypertension: She presents for follow up of hypertension.  She does check blood pressure  regularly outside of the clinic. Outside blood pressures have been over 140/90. She follows a low salt diet.     She eats 4 or more servings of fruits and vegetables daily.She consumes 1 sweetened beverage(s) daily.She exercises with enough effort to increase her heart rate 30 to 60 minutes per day.  She exercises with enough effort to increase her heart rate 4 days per week.   She is taking medications regularly.            Review of Systems   Constitutional, HEENT, cardiovascular, pulmonary, gi and gu systems are negative, except as otherwise noted.      Objective    /76 (BP Location: Left arm, Patient Position: Sitting, Cuff Size: Adult Regular)   Pulse 72   Temp 98.9  F (37.2  C) (Oral)   Resp 16   Ht 1.524 m (5')   Wt 51.3 kg (113 lb)   LMP  (LMP Unknown)   SpO2 99%   BMI 22.07 kg/m    Body mass index is 22.07 kg/m .  Physical Exam   GENERAL: healthy, alert and no distress  NECK: no adenopathy, no asymmetry, masses, or scars and thyroid normal to palpation  RESP: lungs clear to auscultation - no rales, rhonchi or wheezes  CV: regular rate and rhythm, normal S1 S2, no S3 or S4, no murmur, click or rub, no peripheral edema and peripheral pulses strong  ABDOMEN:  soft, nontender, no hepatosplenomegaly, no masses and bowel sounds normal  MS: no gross musculoskeletal defects noted, no edema

## 2024-01-25 DIAGNOSIS — M54.16 LUMBAR RADICULITIS: ICD-10-CM

## 2024-01-25 RX ORDER — TIZANIDINE 2 MG/1
2 TABLET ORAL 3 TIMES DAILY PRN
Qty: 90 TABLET | Refills: 1 | Status: SHIPPED | OUTPATIENT
Start: 2024-01-25

## 2024-01-25 NOTE — TELEPHONE ENCOUNTER
Pt returned call. Pt requesting refill of Tizanidine. She is hoping to get this today as she leaves for [place] tomorrow. LOV 6/6/23.     Informed pt that provider would be updated with her request. Pharmacy verified and order prepped.

## 2024-01-25 NOTE — TELEPHONE ENCOUNTER
[facility] Call Center    Phone Message    May a detailed message be left on voicemail: yes     Reason for Call: Medication Refill Request    Has the patient contacted the pharmacy for the refill? Yes   Name of medication being requested: tizanidine 2mg   Provider who prescribed the medication: Miley Burroughs  Pharmacy: [facility]  Date medication is needed: ASAP   YES    Action Taken: Other: MPWD    Travel Screening: Not Applicable

## 2024-01-25 NOTE — TELEPHONE ENCOUNTER
PSP:  Miley Burroughs PA-C   Last clinic visit:  6/6/23 per Brittney Pettit CNP   Reason for call: request for refill of Tizanidine.  Phone call to patient to discuss. Left message to return call.

## 2024-04-22 ENCOUNTER — OFFICE VISIT (OUTPATIENT)
Dept: FAMILY MEDICINE | Facility: CLINIC | Age: 84
End: 2024-04-22
Payer: COMMERCIAL

## 2024-04-22 VITALS
SYSTOLIC BLOOD PRESSURE: 120 MMHG | OXYGEN SATURATION: 97 % | BODY MASS INDEX: 22.38 KG/M2 | DIASTOLIC BLOOD PRESSURE: 72 MMHG | HEIGHT: 60 IN | TEMPERATURE: 98.3 F | WEIGHT: 114 LBS | RESPIRATION RATE: 20 BRPM | HEART RATE: 76 BPM

## 2024-04-22 DIAGNOSIS — H93.8X3 SENSATION OF PLUGGED EAR ON BOTH SIDES: ICD-10-CM

## 2024-04-22 DIAGNOSIS — H61.23 BILATERAL IMPACTED CERUMEN: ICD-10-CM

## 2024-04-22 DIAGNOSIS — I10 PRIMARY HYPERTENSION: Primary | ICD-10-CM

## 2024-04-22 PROCEDURE — 69210 REMOVE IMPACTED EAR WAX UNI: CPT | Mod: RT | Performed by: FAMILY MEDICINE

## 2024-04-22 PROCEDURE — 99213 OFFICE O/P EST LOW 20 MIN: CPT | Mod: 25 | Performed by: FAMILY MEDICINE

## 2024-04-22 NOTE — PROGRESS NOTES
Assessment & Plan     (I10) Primary hypertension  (primary encounter diagnosis)  Comment: blood pressure good controlled. She takes medication as instructed and no side effect. She check bp at home regular and bp around 120-130/70-80. She is asymptomatic.   Plan: continue current medication and monitor bp at home. If bp is < 100/60 she will hold medication.     (H93.8X3) Sensation of plugged ear on both sides  Comment: pt has ear plugged sensation bilateral with muffle sound. . No ear pain, discharge, ear ringing. Exam: bilateral wax impacted.   Plan: removed left ear wax. She feels water in the left ear after ear wash. Right ear no improvement.     (H61.23) Bilateral impacted cerumen  Comment: wax removed on left ear after ear wash. But right ear still impacted with wax.   Plan: REMOVE IMPACTED CERUMEN        Advise to apply otc wax removal solution daily at bed time. Follow-up in 1-2 weeks for ear wash.          Nicotine/Tobacco Cessation  She reports that she has been smoking cigarettes. She has a 12.5 pack-year smoking history. She has been exposed to tobacco smoke. She has never used smokeless tobacco.  Nicotine/Tobacco Cessation Plan  Information offered: Patient not interested at this time        See Patient Instructions  The longitudinal plan of care for the diagnosis(es)/condition(s) as documented were addressed during this visit. Due to the added complexity in care, I will continue to support Corazon in the subsequent management and with ongoing continuity of care.    Subjective   Corazon is a 83 year old, presenting for the following health issues:  Medication Update and Ear Problem (Bilateral ear popping & feeling plugged )      4/22/2024     1:38 PM   Additional Questions   Roomed by Gen MILLY CMA     HPI       Hypertension Follow-up    Do you check your blood pressure regularly outside of the clinic? Yes   Are you following a low salt diet? Yes  Are your blood pressures ever more than 140 on the top  number (systolic) OR more   than 90 on the bottom number (diastolic), for example 140/90? No         Review of Systems  Constitutional, HEENT, cardiovascular, pulmonary, gi and gu systems are negative, except as otherwise noted.      Objective    /72 (BP Location: Right arm, Patient Position: Sitting, Cuff Size: Adult Regular)   Pulse 76   Temp 98.3  F (36.8  C) (Oral)   Resp 20   Ht 1.524 m (5')   Wt 51.7 kg (114 lb)   LMP  (LMP Unknown)   SpO2 97%   BMI 22.26 kg/m    Body mass index is 22.26 kg/m .  Physical Exam   GENERAL: alert and no distress  HENT: bilateral ear canals wax impacted. After ear wash left tm clear but right ear canal still wax impacted. , nose and mouth without ulcers or lesions  NECK: no adenopathy, no asymmetry, masses, or scars  RESP: lungs clear to auscultation - no rales, rhonchi or wheezes  CV: regular rate and rhythm, normal S1 S2, no S3 or S4, no murmur, click or rub, no peripheral edema  ABDOMEN: soft, nontender, no hepatosplenomegaly, no masses and bowel sounds normal  MS: no gross musculoskeletal defects noted, no edema       Signed Electronically by: Blanca Gallagher MD

## 2024-05-06 ENCOUNTER — OFFICE VISIT (OUTPATIENT)
Dept: FAMILY MEDICINE | Facility: CLINIC | Age: 84
End: 2024-05-06
Payer: COMMERCIAL

## 2024-05-06 VITALS
OXYGEN SATURATION: 98 % | HEART RATE: 70 BPM | SYSTOLIC BLOOD PRESSURE: 118 MMHG | WEIGHT: 114 LBS | HEIGHT: 60 IN | RESPIRATION RATE: 16 BRPM | TEMPERATURE: 97.6 F | DIASTOLIC BLOOD PRESSURE: 76 MMHG | BODY MASS INDEX: 22.38 KG/M2

## 2024-05-06 DIAGNOSIS — M54.16 LUMBAR RADICULITIS: ICD-10-CM

## 2024-05-06 DIAGNOSIS — I10 PRIMARY HYPERTENSION: Primary | ICD-10-CM

## 2024-05-06 DIAGNOSIS — H61.21 IMPACTED CERUMEN OF RIGHT EAR: ICD-10-CM

## 2024-05-06 PROCEDURE — 99214 OFFICE O/P EST MOD 30 MIN: CPT | Mod: 25 | Performed by: FAMILY MEDICINE

## 2024-05-06 PROCEDURE — 69209 REMOVE IMPACTED EAR WAX UNI: CPT | Mod: RT | Performed by: FAMILY MEDICINE

## 2024-05-06 NOTE — PROGRESS NOTES
"  Assessment & Plan     (I10) Primary hypertension  (primary encounter diagnosis)  Comment: bp good controlled. Pt check bp at home 120-130/70-80. She has been taking aspirin 81 mg daily for years. We discussed the benefit vs risk of her age to take aspirin. Denies active bleeding. Pt strongly want to take it to prevent stroke.   Plan: fall precaution. Strongly advise to quit smoke and she is not ready. Advise to call me if she has active bleeding such as bruise, blood in urine, black stool etc she will call me.     (H61.21) Impacted cerumen of right ear  Comment: pt has right ear plugged sensation. She tried otc wax removal solution. Exam: left canal and tm normal. Right ear some wax. After ear wash wax removed. But pt still has ear plugged. She has seasonal allergy with nose congestion. No cough, fever, sore throat, ear pain and discharge.   Plan: advise to take otc zyrtec prn.     (M54.16) Lumbar radiculitis  Comment: pt has low back pain and sometimes muscle spasm, stable. No injury, shooting pain, numbness and tingling to leg. She follow-up with spine and neurosurgery and takes tizanidine prn for muscle spasm. Good bladder and bm control. She dose not want continue follow-up with them and asks for refill from me.   Plan: she has refills this year. I will take over the refill after 1/25/2025 as long as she is stable.          See Patient Instructions    The longitudinal plan of care for the diagnosis(es)/condition(s) as documented were addressed during this visit. Due to the added complexity in care, I will continue to support Corazon in the subsequent management and with ongoing continuity of care.    Subjective   Corazon is a 83 year old, presenting for the following health issues:  Medication Request (Tizanidine 2mg - Used initially for a pinched nerve from ortho- would like to discuss PCP taking over medication management as she uses for occasional leg \"ticks\" instead of Advil. ) and Follow Up (Recheck Right " plugged ear & discuss ASA: would like to continue taking )        5/6/2024    10:41 AM   Additional Questions   Roomed by Gen MILLY CMA     History of Present Illness       Reason for visit:  Recheck ear    She eats 4 or more servings of fruits and vegetables daily.She consumes 1 sweetened beverage(s) daily.She exercises with enough effort to increase her heart rate 30 to 60 minutes per day.  She exercises with enough effort to increase her heart rate 4 days per week. She is missing 1 dose(s) of medications per week.  She is not taking prescribed medications regularly due to remembering to take.         Hypertension Follow-up    Do you check your blood pressure regularly outside of the clinic? Yes   Are you following a low salt diet? Yes  Are your blood pressures ever more than 140 on the top number (systolic) OR more   than 90 on the bottom number (diastolic), for example 140/90? No       Review of Systems  Constitutional, HEENT, cardiovascular, pulmonary, gi and gu systems are negative, except as otherwise noted.      Objective    /76 (BP Location: Right arm, Patient Position: Sitting, Cuff Size: Adult Regular)   Pulse 70   Temp 97.6  F (36.4  C) (Oral)   Resp 16   Ht 1.524 m (5')   Wt 51.7 kg (114 lb)   LMP  (LMP Unknown)   SpO2 98%   BMI 22.26 kg/m    Body mass index is 22.26 kg/m .  Physical Exam   GENERAL: alert and no distress     HENT: left ear canal and TM are normal, nose and mouth without ulcers or lesions. Right ear canal little wax. Removed after ear wash.   NECK: no adenopathy, no asymmetry, masses, or scars  RESP: lungs clear to auscultation - no rales, rhonchi or wheezes  CV: regular rate and rhythm, normal S1 S2, no S3 or S4, no murmur, click or rub, no peripheral edema  ABDOMEN: soft, nontender, no hepatosplenomegaly, no masses and bowel sounds normal  MS: no gross musculoskeletal defects noted, no edema           Signed Electronically by: Blanca Gallagher MD

## 2024-06-20 ENCOUNTER — OFFICE VISIT (OUTPATIENT)
Dept: FAMILY MEDICINE | Facility: CLINIC | Age: 84
End: 2024-06-20
Payer: COMMERCIAL

## 2024-06-20 VITALS
WEIGHT: 110 LBS | HEIGHT: 60 IN | RESPIRATION RATE: 16 BRPM | OXYGEN SATURATION: 99 % | TEMPERATURE: 98 F | HEART RATE: 72 BPM | SYSTOLIC BLOOD PRESSURE: 134 MMHG | DIASTOLIC BLOOD PRESSURE: 80 MMHG | BODY MASS INDEX: 21.6 KG/M2

## 2024-06-20 DIAGNOSIS — I10 PRIMARY HYPERTENSION: Primary | ICD-10-CM

## 2024-06-20 DIAGNOSIS — H93.8X1 PLUGGED FEELING IN EAR, RIGHT: ICD-10-CM

## 2024-06-20 LAB
ANION GAP SERPL CALCULATED.3IONS-SCNC: 8 MMOL/L (ref 7–15)
BUN SERPL-MCNC: 8.8 MG/DL (ref 8–23)
CALCIUM SERPL-MCNC: 9.6 MG/DL (ref 8.8–10.2)
CHLORIDE SERPL-SCNC: 101 MMOL/L (ref 98–107)
CREAT SERPL-MCNC: 0.73 MG/DL (ref 0.51–0.95)
DEPRECATED HCO3 PLAS-SCNC: 27 MMOL/L (ref 22–29)
EGFRCR SERPLBLD CKD-EPI 2021: 81 ML/MIN/1.73M2
GLUCOSE SERPL-MCNC: 104 MG/DL (ref 70–99)
POTASSIUM SERPL-SCNC: 4.7 MMOL/L (ref 3.4–5.3)
SODIUM SERPL-SCNC: 136 MMOL/L (ref 135–145)

## 2024-06-20 PROCEDURE — G2211 COMPLEX E/M VISIT ADD ON: HCPCS | Performed by: FAMILY MEDICINE

## 2024-06-20 PROCEDURE — 90480 ADMN SARSCOV2 VAC 1/ONLY CMP: CPT | Performed by: FAMILY MEDICINE

## 2024-06-20 PROCEDURE — 99213 OFFICE O/P EST LOW 20 MIN: CPT | Mod: 25 | Performed by: FAMILY MEDICINE

## 2024-06-20 PROCEDURE — 36415 COLL VENOUS BLD VENIPUNCTURE: CPT | Performed by: FAMILY MEDICINE

## 2024-06-20 PROCEDURE — 80048 BASIC METABOLIC PNL TOTAL CA: CPT | Performed by: FAMILY MEDICINE

## 2024-06-20 PROCEDURE — 91320 SARSCV2 VAC 30MCG TRS-SUC IM: CPT | Performed by: FAMILY MEDICINE

## 2024-06-20 RX ORDER — AMLODIPINE BESYLATE 5 MG/1
5 TABLET ORAL DAILY
Qty: 90 TABLET | Refills: 3 | Status: SHIPPED | OUTPATIENT
Start: 2024-06-20

## 2024-06-20 NOTE — PROGRESS NOTES
Assessment & Plan     (I10) Primary hypertension  (primary encounter diagnosis)  Comment: bp reasonable control. She takes medication as instructed and no side effect. She check bp at home around 110-120/7080. She is asymptomatic. She is smoker and not ready to quit.   Plan: BASIC METABOLIC PANEL, amLODIPine (NORVASC) 5         MG tablet        Continue current medication and close monitor bp at home. If bp is < 100/60 she will hold amlodipine. Advise to quit smoke. Follow-up in 6 month.     (H93.8X1) Plugged feeling in ear, right  Comment: pt has right ear plugged sensation since 2019. Denies ear pain, ear ringing. Reviewed the chart pt has audiolgy/ent consult and head mri. No remarkable finding. Ear exam is normal today.   Plan: we discussed ent and audiology consult again, she declined. Will continue observe. If the symptoms worse or develops new symptoms she will call me.       The longitudinal plan of care for the diagnosis(es)/condition(s) as documented were addressed during this visit. Due to the added complexity in care, I will continue to support Corazon in the subsequent management and with ongoing continuity of care.      See Patient Instructions    Subjective   Corazon is a 83 year old, presenting for the following health issues:  Hypertension (B/p recheck) and Ear Problem (Recheck right ear still has occ pressure in the ear )        6/20/2024     9:32 AM   Additional Questions   Roomed by Gen MILLY CMA         6/20/2024     9:32 AM   Patient Reported Additional Medications   Patient reports taking the following new medications [employer] flonase     Ear Problem    History of Present Illness       Hypertension: She presents for follow up of hypertension.  She does check blood pressure  regularly outside of the clinic. Outpatient blood pressures have not been over 140/90. She follows a low salt diet.     She eats 4 or more servings of fruits and vegetables daily.She consumes 1 sweetened beverage(s)  daily.She exercises with enough effort to increase her heart rate 30 to 60 minutes per day.  She exercises with enough effort to increase her heart rate 4 days per week.   She is taking medications regularly.     Review of Systems  Constitutional, HEENT, cardiovascular, pulmonary, gi and gu systems are negative, except as otherwise noted.      Objective    /80 (BP Location: Right arm, Patient Position: Sitting, Cuff Size: Adult Regular)   Pulse 72   Temp 98  F (36.7  C) (Oral)   Resp 16   Ht 1.524 m (5')   Wt 49.9 kg (110 lb)   LMP  (LMP Unknown)   SpO2 99%   BMI 21.48 kg/m    Body mass index is 21.48 kg/m .  Physical Exam   GENERAL: alert and no distress  HENT: ear canals and TM's normal, nose and mouth without ulcers or lesions  NECK: no adenopathy, no asymmetry, masses, or scars  RESP: lungs clear to auscultation - no rales, rhonchi or wheezes  CV: regular rate and rhythm, normal S1 S2, no S3 or S4, no murmur, click or rub, no peripheral edema  ABDOMEN: soft, nontender, no hepatosplenomegaly, no masses and bowel sounds normal  MS: no gross musculoskeletal defects noted, no edema       Signed Electronically by: Blanca Gallagher MD

## 2024-06-21 ENCOUNTER — TELEPHONE (OUTPATIENT)
Dept: FAMILY MEDICINE | Facility: CLINIC | Age: 84
End: 2024-06-21
Payer: COMMERCIAL

## 2024-06-21 NOTE — TELEPHONE ENCOUNTER
----- Message from Blanca Gallagher sent at 6/21/2024  8:38 AM CDT -----  Please call pt to inform her that lab is normal. Good job. Normal non fasting glucose, electrolytes and kidney function.     Blanca Gallagher MD on 6/21/2024 at 8:37 AM

## 2024-06-21 NOTE — TELEPHONE ENCOUNTER
Patient Returning Call    Reason for call:  Patient returned call     Information relayed to patient:  Writer relayed the message below to patient.    Patient has additional questions:  Yes    What are your questions/concerns:  Patient is asking if she can get these results mailed to her please and thank you.    Is an  needed?:  No      Okay to leave a detailed message?: Yes at Home number on file [phone] (home)

## 2024-07-27 ENCOUNTER — HOSPITAL ENCOUNTER (OUTPATIENT)
Facility: CLINIC | Age: 84
Setting detail: OBSERVATION
Discharge: HOME | End: 2024-07-28
Attending: STUDENT IN AN ORGANIZED HEALTH CARE EDUCATION/TRAINING PROGRAM | Admitting: INTERNAL MEDICINE
Payer: COMMERCIAL

## 2024-07-27 ENCOUNTER — APPOINTMENT (OUTPATIENT)
Dept: CT IMAGING | Facility: CLINIC | Age: 84
End: 2024-07-27
Attending: STUDENT IN AN ORGANIZED HEALTH CARE EDUCATION/TRAINING PROGRAM
Payer: COMMERCIAL

## 2024-07-27 DIAGNOSIS — I10 PRIMARY HYPERTENSION: Primary | ICD-10-CM

## 2024-07-27 DIAGNOSIS — N28.0 RENAL INFARCT (H): ICD-10-CM

## 2024-07-27 LAB
ALBUMIN SERPL BCG-MCNC: 4.2 G/DL (ref 3.5–5.2)
ALBUMIN UR-MCNC: NEGATIVE MG/DL
ALP SERPL-CCNC: 122 U/L (ref 40–150)
ALT SERPL W P-5'-P-CCNC: 6 U/L (ref 0–50)
ANION GAP SERPL CALCULATED.3IONS-SCNC: 6 MMOL/L (ref 7–15)
APPEARANCE UR: ABNORMAL
AST SERPL W P-5'-P-CCNC: 24 U/L (ref 0–45)
ATRIAL RATE - MUSE: 60 BPM
ATRIAL RATE - MUSE: 63 BPM
BACTERIA #/AREA URNS HPF: ABNORMAL /HPF
BASOPHILS # BLD AUTO: 0 10E3/UL (ref 0–0.2)
BASOPHILS NFR BLD AUTO: 1 %
BILIRUB SERPL-MCNC: 1 MG/DL
BILIRUB UR QL STRIP: NEGATIVE
BUN SERPL-MCNC: 8.5 MG/DL (ref 8–23)
CALCIUM SERPL-MCNC: 9.3 MG/DL (ref 8.8–10.4)
CHLORIDE SERPL-SCNC: 102 MMOL/L (ref 98–107)
COLOR UR AUTO: ABNORMAL
CREAT SERPL-MCNC: 0.71 MG/DL (ref 0.51–0.95)
DIASTOLIC BLOOD PRESSURE - MUSE: 85 MMHG
DIASTOLIC BLOOD PRESSURE - MUSE: 87 MMHG
EGFRCR SERPLBLD CKD-EPI 2021: 84 ML/MIN/1.73M2
EOSINOPHIL # BLD AUTO: 0.3 10E3/UL (ref 0–0.7)
EOSINOPHIL NFR BLD AUTO: 6 %
ERYTHROCYTE [DISTWIDTH] IN BLOOD BY AUTOMATED COUNT: 13.2 % (ref 10–15)
GLUCOSE SERPL-MCNC: 114 MG/DL (ref 70–99)
GLUCOSE UR STRIP-MCNC: NEGATIVE MG/DL
HBA1C MFR BLD: 5.5 %
HCO3 SERPL-SCNC: 30 MMOL/L (ref 22–29)
HCT VFR BLD AUTO: 43.8 % (ref 35–47)
HGB BLD-MCNC: 15.1 G/DL (ref 11.7–15.7)
HGB UR QL STRIP: NEGATIVE
HYALINE CASTS: 1 /LPF
IMM GRANULOCYTES # BLD: 0 10E3/UL
IMM GRANULOCYTES NFR BLD: 0 %
INTERPRETATION ECG - MUSE: NORMAL
INTERPRETATION ECG - MUSE: NORMAL
KETONES UR STRIP-MCNC: NEGATIVE MG/DL
LACTATE SERPL-SCNC: 0.7 MMOL/L (ref 0.7–2)
LEUKOCYTE ESTERASE UR QL STRIP: ABNORMAL
LIPASE SERPL-CCNC: 25 U/L (ref 13–60)
LYMPHOCYTES # BLD AUTO: 2.2 10E3/UL (ref 0.8–5.3)
LYMPHOCYTES NFR BLD AUTO: 38 %
MCH RBC QN AUTO: 32 PG (ref 26.5–33)
MCHC RBC AUTO-ENTMCNC: 34.5 G/DL (ref 31.5–36.5)
MCV RBC AUTO: 93 FL (ref 78–100)
MONOCYTES # BLD AUTO: 0.4 10E3/UL (ref 0–1.3)
MONOCYTES NFR BLD AUTO: 7 %
MUCOUS THREADS #/AREA URNS LPF: PRESENT /LPF
NEUTROPHILS # BLD AUTO: 2.8 10E3/UL (ref 1.6–8.3)
NEUTROPHILS NFR BLD AUTO: 48 %
NITRATE UR QL: NEGATIVE
NRBC # BLD AUTO: 0 10E3/UL
NRBC BLD AUTO-RTO: 0 /100
P AXIS - MUSE: 43 DEGREES
P AXIS - MUSE: 51 DEGREES
PH UR STRIP: 7.5 [PH] (ref 5–7)
PLATELET # BLD AUTO: 196 10E3/UL (ref 150–450)
POTASSIUM SERPL-SCNC: 3.9 MMOL/L (ref 3.4–5.3)
PR INTERVAL - MUSE: 148 MS
PR INTERVAL - MUSE: 150 MS
PROT SERPL-MCNC: 7 G/DL (ref 6.4–8.3)
QRS DURATION - MUSE: 72 MS
QRS DURATION - MUSE: 80 MS
QT - MUSE: 416 MS
QT - MUSE: 434 MS
QTC - MUSE: 425 MS
QTC - MUSE: 434 MS
R AXIS - MUSE: -35 DEGREES
R AXIS - MUSE: -38 DEGREES
RBC # BLD AUTO: 4.72 10E6/UL (ref 3.8–5.2)
RBC URINE: 1 /HPF
SODIUM SERPL-SCNC: 138 MMOL/L (ref 135–145)
SP GR UR STRIP: 1.01 (ref 1–1.03)
SQUAMOUS EPITHELIAL: 7 /HPF
SYSTOLIC BLOOD PRESSURE - MUSE: 146 MMHG
SYSTOLIC BLOOD PRESSURE - MUSE: 167 MMHG
T AXIS - MUSE: 31 DEGREES
T AXIS - MUSE: 36 DEGREES
TSH SERPL DL<=0.005 MIU/L-ACNC: 1.52 UIU/ML (ref 0.3–4.2)
UFH PPP CHRO-ACNC: 0.56 IU/ML
UFH PPP CHRO-ACNC: 0.88 IU/ML
UROBILINOGEN UR STRIP-MCNC: <2 MG/DL
VENTRICULAR RATE- MUSE: 60 BPM
VENTRICULAR RATE- MUSE: 63 BPM
WBC # BLD AUTO: 5.8 10E3/UL (ref 4–11)
WBC CLUMPS #/AREA URNS HPF: PRESENT /HPF
WBC URINE: 13 /HPF

## 2024-07-27 PROCEDURE — 96375 TX/PRO/DX INJ NEW DRUG ADDON: CPT | Mod: 59

## 2024-07-27 PROCEDURE — 99222 1ST HOSP IP/OBS MODERATE 55: CPT | Mod: 25 | Performed by: NURSE PRACTITIONER

## 2024-07-27 PROCEDURE — 96366 THER/PROPH/DIAG IV INF ADDON: CPT

## 2024-07-27 PROCEDURE — 71275 CT ANGIOGRAPHY CHEST: CPT

## 2024-07-27 PROCEDURE — 250N000013 HC RX MED GY IP 250 OP 250 PS 637: Performed by: FAMILY MEDICINE

## 2024-07-27 PROCEDURE — 83036 HEMOGLOBIN GLYCOSYLATED A1C: CPT | Performed by: FAMILY MEDICINE

## 2024-07-27 PROCEDURE — 250N000011 HC RX IP 250 OP 636: Performed by: STUDENT IN AN ORGANIZED HEALTH CARE EDUCATION/TRAINING PROGRAM

## 2024-07-27 PROCEDURE — 99223 1ST HOSP IP/OBS HIGH 75: CPT | Performed by: FAMILY MEDICINE

## 2024-07-27 PROCEDURE — 250N000011 HC RX IP 250 OP 636: Performed by: FAMILY MEDICINE

## 2024-07-27 PROCEDURE — 87086 URINE CULTURE/COLONY COUNT: CPT | Performed by: EMERGENCY MEDICINE

## 2024-07-27 PROCEDURE — 85520 HEPARIN ASSAY: CPT | Performed by: FAMILY MEDICINE

## 2024-07-27 PROCEDURE — 93005 ELECTROCARDIOGRAM TRACING: CPT | Performed by: FAMILY MEDICINE

## 2024-07-27 PROCEDURE — 36415 COLL VENOUS BLD VENIPUNCTURE: CPT | Performed by: FAMILY MEDICINE

## 2024-07-27 PROCEDURE — 120N000001 HC R&B MED SURG/OB

## 2024-07-27 PROCEDURE — 83605 ASSAY OF LACTIC ACID: CPT | Performed by: STUDENT IN AN ORGANIZED HEALTH CARE EDUCATION/TRAINING PROGRAM

## 2024-07-27 PROCEDURE — 96365 THER/PROPH/DIAG IV INF INIT: CPT | Mod: 59

## 2024-07-27 PROCEDURE — 93005 ELECTROCARDIOGRAM TRACING: CPT | Performed by: STUDENT IN AN ORGANIZED HEALTH CARE EDUCATION/TRAINING PROGRAM

## 2024-07-27 PROCEDURE — 83690 ASSAY OF LIPASE: CPT | Performed by: STUDENT IN AN ORGANIZED HEALTH CARE EDUCATION/TRAINING PROGRAM

## 2024-07-27 PROCEDURE — 74176 CT ABD & PELVIS W/O CONTRAST: CPT

## 2024-07-27 PROCEDURE — 85025 COMPLETE CBC W/AUTO DIFF WBC: CPT | Performed by: STUDENT IN AN ORGANIZED HEALTH CARE EDUCATION/TRAINING PROGRAM

## 2024-07-27 PROCEDURE — 82040 ASSAY OF SERUM ALBUMIN: CPT | Performed by: STUDENT IN AN ORGANIZED HEALTH CARE EDUCATION/TRAINING PROGRAM

## 2024-07-27 PROCEDURE — 81001 URINALYSIS AUTO W/SCOPE: CPT | Performed by: EMERGENCY MEDICINE

## 2024-07-27 PROCEDURE — 250N000013 HC RX MED GY IP 250 OP 250 PS 637: Performed by: NURSE PRACTITIONER

## 2024-07-27 PROCEDURE — 84443 ASSAY THYROID STIM HORMONE: CPT | Performed by: FAMILY MEDICINE

## 2024-07-27 PROCEDURE — 36415 COLL VENOUS BLD VENIPUNCTURE: CPT | Performed by: STUDENT IN AN ORGANIZED HEALTH CARE EDUCATION/TRAINING PROGRAM

## 2024-07-27 PROCEDURE — 99285 EMERGENCY DEPT VISIT HI MDM: CPT | Mod: 25

## 2024-07-27 RX ORDER — HEPARIN SODIUM 10000 [USP'U]/100ML
0-5000 INJECTION, SOLUTION INTRAVENOUS CONTINUOUS
Status: DISCONTINUED | OUTPATIENT
Start: 2024-07-27 | End: 2024-07-28

## 2024-07-27 RX ORDER — POLYETHYLENE GLYCOL 3350 17 G/17G
17 POWDER, FOR SOLUTION ORAL DAILY
Status: DISCONTINUED | OUTPATIENT
Start: 2024-07-27 | End: 2024-07-28 | Stop reason: HOSPADM

## 2024-07-27 RX ORDER — ACETAMINOPHEN 325 MG/1
650 TABLET ORAL EVERY 4 HOURS PRN
Status: DISCONTINUED | OUTPATIENT
Start: 2024-07-27 | End: 2024-07-28 | Stop reason: HOSPADM

## 2024-07-27 RX ORDER — ACETAMINOPHEN 650 MG/1
650 SUPPOSITORY RECTAL EVERY 4 HOURS PRN
Status: DISCONTINUED | OUTPATIENT
Start: 2024-07-27 | End: 2024-07-28 | Stop reason: HOSPADM

## 2024-07-27 RX ORDER — LATANOPROST 50 UG/ML
1 SOLUTION/ DROPS OPHTHALMIC AT BEDTIME
Status: DISCONTINUED | OUTPATIENT
Start: 2024-07-27 | End: 2024-07-28 | Stop reason: HOSPADM

## 2024-07-27 RX ORDER — DIPHENHYDRAMINE HCL 25 MG
25 CAPSULE ORAL
Status: DISCONTINUED | OUTPATIENT
Start: 2024-07-27 | End: 2024-07-27

## 2024-07-27 RX ORDER — AMLODIPINE BESYLATE 5 MG/1
5 TABLET ORAL DAILY
Status: DISCONTINUED | OUTPATIENT
Start: 2024-07-28 | End: 2024-07-28 | Stop reason: HOSPADM

## 2024-07-27 RX ORDER — ASPIRIN 81 MG/1
81 TABLET ORAL DAILY
Status: DISCONTINUED | OUTPATIENT
Start: 2024-07-27 | End: 2024-07-28 | Stop reason: HOSPADM

## 2024-07-27 RX ORDER — TIZANIDINE 2 MG/1
2 TABLET ORAL 3 TIMES DAILY PRN
Status: DISCONTINUED | OUTPATIENT
Start: 2024-07-27 | End: 2024-07-28 | Stop reason: HOSPADM

## 2024-07-27 RX ORDER — IOPAMIDOL 755 MG/ML
90 INJECTION, SOLUTION INTRAVASCULAR ONCE
Status: COMPLETED | OUTPATIENT
Start: 2024-07-27 | End: 2024-07-27

## 2024-07-27 RX ORDER — AMOXICILLIN 250 MG
1 CAPSULE ORAL 2 TIMES DAILY PRN
Status: DISCONTINUED | OUTPATIENT
Start: 2024-07-27 | End: 2024-07-28 | Stop reason: HOSPADM

## 2024-07-27 RX ORDER — ONDANSETRON 2 MG/ML
4 INJECTION INTRAMUSCULAR; INTRAVENOUS EVERY 6 HOURS PRN
Status: DISCONTINUED | OUTPATIENT
Start: 2024-07-27 | End: 2024-07-28 | Stop reason: HOSPADM

## 2024-07-27 RX ORDER — ONDANSETRON 4 MG/1
4 TABLET, ORALLY DISINTEGRATING ORAL EVERY 6 HOURS PRN
Status: DISCONTINUED | OUTPATIENT
Start: 2024-07-27 | End: 2024-07-28 | Stop reason: HOSPADM

## 2024-07-27 RX ORDER — VIT A/VIT C/VIT E/ZINC/COPPER 2148-113
1 TABLET ORAL DAILY
COMMUNITY

## 2024-07-27 RX ORDER — LISINOPRIL 2.5 MG/1
2.5 TABLET ORAL DAILY
Status: DISCONTINUED | OUTPATIENT
Start: 2024-07-27 | End: 2024-07-28 | Stop reason: HOSPADM

## 2024-07-27 RX ORDER — DIPHENHYDRAMINE HCL 25 MG
25-50 CAPSULE ORAL
Status: DISCONTINUED | OUTPATIENT
Start: 2024-07-27 | End: 2024-07-28 | Stop reason: HOSPADM

## 2024-07-27 RX ORDER — FLUTICASONE PROPIONATE 50 MCG
1 SPRAY, SUSPENSION (ML) NASAL DAILY PRN
COMMUNITY

## 2024-07-27 RX ORDER — AMOXICILLIN 250 MG
2 CAPSULE ORAL 2 TIMES DAILY PRN
Status: DISCONTINUED | OUTPATIENT
Start: 2024-07-27 | End: 2024-07-28 | Stop reason: HOSPADM

## 2024-07-27 RX ORDER — LIDOCAINE 40 MG/G
CREAM TOPICAL
Status: DISCONTINUED | OUTPATIENT
Start: 2024-07-27 | End: 2024-07-28 | Stop reason: HOSPADM

## 2024-07-27 RX ORDER — KETOROLAC TROMETHAMINE 15 MG/ML
15 INJECTION, SOLUTION INTRAMUSCULAR; INTRAVENOUS ONCE
Status: COMPLETED | OUTPATIENT
Start: 2024-07-27 | End: 2024-07-27

## 2024-07-27 RX ORDER — MORPHINE SULFATE 2 MG/ML
2 INJECTION, SOLUTION INTRAMUSCULAR; INTRAVENOUS ONCE
Status: COMPLETED | OUTPATIENT
Start: 2024-07-27 | End: 2024-07-27

## 2024-07-27 RX ADMIN — DIPHENHYDRAMINE HYDROCHLORIDE 25 MG: 25 CAPSULE ORAL at 20:19

## 2024-07-27 RX ADMIN — LATANOPROST 1 DROP: 50 SOLUTION/ DROPS OPHTHALMIC at 20:21

## 2024-07-27 RX ADMIN — POLYETHYLENE GLYCOL 3350 17 G: 17 POWDER, FOR SOLUTION ORAL at 10:04

## 2024-07-27 RX ADMIN — HEPARIN SODIUM 850 UNITS/HR: 10000 INJECTION, SOLUTION INTRAVENOUS at 09:58

## 2024-07-27 RX ADMIN — LISINOPRIL 2.5 MG: 2.5 TABLET ORAL at 15:06

## 2024-07-27 RX ADMIN — ASPIRIN 81 MG: 81 TABLET, COATED ORAL at 15:06

## 2024-07-27 RX ADMIN — KETOROLAC TROMETHAMINE 15 MG: 15 INJECTION, SOLUTION INTRAMUSCULAR; INTRAVENOUS at 06:24

## 2024-07-27 RX ADMIN — IOPAMIDOL 90 ML: 755 INJECTION, SOLUTION INTRAVENOUS at 07:10

## 2024-07-27 ASSESSMENT — ACTIVITIES OF DAILY LIVING (ADL)
ADLS_ACUITY_SCORE: 36
ADLS_ACUITY_SCORE: 20
ADLS_ACUITY_SCORE: 35
ADLS_ACUITY_SCORE: 35
ADLS_ACUITY_SCORE: 36
ADLS_ACUITY_SCORE: 36
ADLS_ACUITY_SCORE: 20
ADLS_ACUITY_SCORE: 35
ADLS_ACUITY_SCORE: 36
ADLS_ACUITY_SCORE: 20
ADLS_ACUITY_SCORE: 36

## 2024-07-27 ASSESSMENT — COLUMBIA-SUICIDE SEVERITY RATING SCALE - C-SSRS
1. IN THE PAST MONTH, HAVE YOU WISHED YOU WERE DEAD OR WISHED YOU COULD GO TO SLEEP AND NOT WAKE UP?: NO
6. HAVE YOU EVER DONE ANYTHING, STARTED TO DO ANYTHING, OR PREPARED TO DO ANYTHING TO END YOUR LIFE?: NO
2. HAVE YOU ACTUALLY HAD ANY THOUGHTS OF KILLING YOURSELF IN THE PAST MONTH?: NO

## 2024-07-27 ASSESSMENT — ENCOUNTER SYMPTOMS
VOMITING: 0
FEVER: 0
BACK PAIN: 1
DYSURIA: 0
ABDOMINAL PAIN: 1
NAUSEA: 0

## 2024-07-27 NOTE — PHARMACY-ADMISSION MEDICATION HISTORY
Pharmacist Admission Medication History    Admission medication history is complete. The information provided in this note is only as accurate as the sources available at the time of the update.    Information Source(s): Patient and CareEverywhere/SureScripts via in-person    Pertinent Information: n/a    Changes made to PTA medication list:  Added: Flonase, Saline nasal spray   Deleted: none  Changed: changed Ocuvite supplement to Preservision supplement daily     Allergies reviewed with patient and updates made in EHR: yes    Medication History Completed By: Nancy Puga [facility] 7/27/2024 9:36 AM    PTA Med List   Medication Sig Last Dose    amLODIPine (NORVASC) 5 MG tablet Take 1 tablet (5 mg) by mouth daily 7/27/2024 at am    aspirin (ASA) 81 MG EC tablet Take 81 mg by mouth every morning 7/26/2024 at am    BENADRYL 25 MG OR CAPS Take 1-2 capsules by mouth nightly as needed for sleep 7/26/2024 at hs    CENTRUM SILVER OR TABS Take 1 tablet by mouth daily 7/26/2024 at am    FISH OIL 1000 MG OR CAPS Take 1 g by mouth every morning 7/26/2024 at am    fluticasone (FLONASE) 50 MCG/ACT nasal spray Spray 1 spray into both nostrils daily as needed for rhinitis or allergies Past Week at prn    Ibuprofen (ADVIL PO) Take 200 mg by mouth every 6 hours as needed for moderate pain or fever 7/26/2024 at hs. prn    Lactobacillus (PROBIOTIC ACIDOPHILUS PO) Take 1 capsule by mouth daily 7/26/2024 at afternoon    latanoprost (XALATAN) 0.005 % ophthalmic solution Place 1 drop into both eyes at bedtime 7/26/2024 at hs    Multiple Vitamins-Minerals (PRESERVISION AREDS) TABS Take 1 tablet by mouth daily 7/26/2024 at am    Psyllium (METAMUCIL PO) Take 1 packet by mouth every morning 7/26/2024 at am    sodium chloride (OCEAN) 0.65 % nasal spray Spray 1 spray into both nostrils daily as needed for congestion Past Week at prn    tiZANidine (ZANAFLEX) 2 MG tablet Take 1 tablet (2 mg) by mouth 3 times daily as needed for muscle spasms Past  Week at prn    VITAMIN C 500 MG OR TABS Take 500 mg by mouth daily 7/26/2024 at am

## 2024-07-27 NOTE — ED TRIAGE NOTES
Pt started having low back pain last night around dinner, she also has RLQ pain as well.  Denies pain with urination.      Triage Assessment (Adult)       Row Name 07/27/24 0612          Triage Assessment    Airway WDL WDL        Respiratory WDL    Respiratory WDL WDL        Skin Circulation/Temperature WDL    Skin Circulation/Temperature WDL WDL        Cardiac WDL    Cardiac WDL WDL        Peripheral/Neurovascular WDL    Peripheral Neurovascular WDL WDL        Cognitive/Neuro/Behavioral WDL    Cognitive/Neuro/Behavioral WDL WDL

## 2024-07-27 NOTE — PROGRESS NOTES
Renal   Aware of consult.   Chart reviewed.   No emergent nephrology needs.   We will see later today.   Nichole Walker MD  [facility]  [phone]

## 2024-07-27 NOTE — PLAN OF CARE
Problem: Pain Acute  Goal: Optimal Pain Control and Function  Outcome: Progressing   Goal Outcome Evaluation:             Pt alert and oriented and able to make all needs known.  Denies pain at this time.  Up walking in hallways.  Family at the bedside.  Heparin drip paused for 1 hour and dose will be decreased by 100 units.  Tolerating oral intake.

## 2024-07-27 NOTE — H&P
[facility] MEDICINE ADMISSION HISTORY AND PHYSICAL     Assessment & Plan       Corazon Hester is a 83 year old female with history of essential hypertension, came with right flank pain since yesterday afternoon.  Denies known injury or urinary symptoms.  CT scan revealed right renal infarct.  No history of blood clot or arrhythmia in the past.  Started on IV heparin drip and admitted to the hospital.    Right renal infarct  [id number] area of nonenhancement of the lateral midportion of the right kidney suggests an acute renal infarct.   Heparin drip  Telemetry monitoring to rule out arrhythmia  Echocardiogram  CT scan is unremarkable  No history of blood clot in the past  Nephrology consult    Mild renal artery stenosis  CT-slight narrowing at the origin of both renal arteries, no dissection or arterial clot visible     Essential hypertension  Norvasc 5 mg daily    8 mm right breast nodule   Mammogram 2003 revealed right breast nodularity  Follow-up mammogram as outpatient    Multinodular goiter, measuring 1.2 cm  No need for follow-up    Code DNR  DVT prophylaxis  Anticoagulation started for right renal infarct     Inpatient admission  Needs to stay in the hospital at least for 2 days for further evaluation and treatment      Clinically Significant Risk Factors Present on Admission                # Drug Induced Platelet Defect: home medication list includes an antiplatelet medication   # Hypertension: Noted on problem list                 Chief Complaint Right-sided flank pain     HISTORY     Corazon Hester is a 83 year old  female with essential hypertension, came with right flank pain since yesterday afternoon.  Developed sudden onset of right flank pain which is radiating to the right groin.  No urinary symptoms.  No frequency or urgency of urination.  Does not have known back injury.  Lives independently.  She drove to the emergency department by herself.  No recent  illnesses or other concern. CT scan revealed right renal infarct.  No history of blood clot or arrhythmia in the past.  Started on IV heparin drip and admitted to the hospital.  History is provided by the patient.  Spoke with ED physician.    Past Medical History     Past Medical History:  1950s?: Duodenal ulcer, unspecified as acute or chronic, without   hemorrhage, perforation, or obstruction  No date: GERD (gastroesophageal reflux disease)      Comment:  Described as belching  2009: HTN (hypertension)  No date: Tobacco abuse     Surgical History     Past Surgical History:   Procedure Laterality Date    HC DILATION/CURETTAGE DIAG/THER NON OB      x3-4    HC REMOVAL OF TONSILS,<13 Y/O      STRIP VEIN  9-11    left leg     Family History    Reviewed, and   Family History   Problem Relation Age of Onset    Cancer Father         Bladder    Hypertension Sister      Social History      Social History     Tobacco Use    Smoking status: Every Day     Current packs/day: 0.25     Average packs/day: 0.3 packs/day for 50.0 years (12.5 ttl pk-yrs)     Types: Cigarettes     Passive exposure: Past    Smokeless tobacco: Never    Tobacco comments:     3-4 cigarettes per day   Vaping Use    Vaping status: Never Used   Substance Use Topics    Alcohol use: Yes     Alcohol/week: 7.0 standard drinks of alcohol     Comment: 7 drinks per week    Drug use: No        Allergies     Allergies   Allergen Reactions    Calcium GI Disturbance    Tetracycline Diarrhea     Prior to Admission Medications      Prior to Admission Medications   Prescriptions Last Dose Informant Patient Reported? Taking?   BENADRYL 25 MG OR CAPS   Yes No   Sig: ONE TO TWO CAPSULES EVERY 4-6 HOURS AS NEEDED   CENTRUM SILVER OR TABS   Yes No   Si TABLET DAILY   FISH OIL 1000 MG OR CAPS   Yes No   Si cap daily    Ibuprofen (ADVIL PO)   Yes No   Lactobacillus (PROBIOTIC ACIDOPHILUS PO)   Yes No   METAMUCIL OR   Yes No   Sig: one time daily   VITAMIN C 500 MG OR  TABS   Yes No   Sig: one qd   amLODIPine (NORVASC) 5 MG tablet   No No   Sig: Take 1 tablet (5 mg) by mouth daily   aspirin (ASA) 81 MG EC tablet   Yes No   latanoprost (XALATAN) 0.005 % ophthalmic solution   Yes No   multivitamin (OCUVITE) TABS   Yes No   Sig: Take 1 tablet by mouth daily.   tiZANidine (ZANAFLEX) 2 MG tablet   No No   Sig: Take 1 tablet (2 mg) by mouth 3 times daily as needed for muscle spasms      Facility-Administered Medications: None      Review of Systems     A 12 point comprehensive review of systems was negative except as noted above in HPI.    PHYSICAL EXAMINATION       Vitals      Temp:  [97.8  F (36.6  C)] 97.8  F (36.6  C)  Pulse:  [61-77] 63  Resp:  [17-18] 17  BP: (150-186)/(81-99) 150/81  SpO2:  [97 %-100 %] 100 %    Examination     GENERAL:  Alert, appears comfortable, in no acute distress, appears stated age   HEAD:  Normocephalic, without obvious abnormality, atraumatic   EYES:  PERRL, conjunctiva clear,  EOM's intact   NOSE: Nares normal,  mucosa normal, no drainage   THROAT: Lips, mucosa,  gums normal, mouth moist   NECK: Supple, symmetrical, trachea midline   BACK:   Symmetric, no curvature, ROM normal, right flank pain   LUNGS:   Clear to auscultation bilaterally, no rales, rhonchi, or wheezing, symmetric chest rise on inhalation, respirations unlabored   CHEST WALL:  No tenderness or deformity   HEART:  Regular rate and rhythm, S1 and S2 normal, no murmur, rub, or gallop    ABDOMEN:   Soft, right flank tenderness, bowel sounds active, no masses, no organomegaly, no rebound or guarding   EXTREMITIES: No edema    SKIN: No exanthems in the visualized areas   NEURO: Alert, oriented x 4, moves all four extremities freely, non-focal   PSYCH: Cooperative, behavior is appropriate      Pertinent Lab   Most Recent 3 CBC's:  Recent Labs   Lab Test 07/27/24  0619 05/10/21  0943 07/06/20  1107   WBC 5.8 5.3 5.4   HGB 15.1 15.6 15.7   MCV 93 96 95    215 215     Most Recent 3  BMP's:  Recent Labs   Lab Test 07/27/24  0619 06/20/24  1005 05/12/23  1044    136 140   POTASSIUM 3.9 4.7 4.6   CHLORIDE 102 101 104   CO2 30* 27 26   BUN 8.5 8.8 9.3   CR 0.71 0.73 0.71   ANIONGAP 6* 8 10   KE 9.3 9.6 9.3   * 104* 95     Most Recent 2 LFT's:  Recent Labs   Lab Test 07/27/24  0619 06/28/23  1036   AST 24 22   ALT 6 15   ALKPHOS 122 127*   BILITOTAL 1.0 0.9     Most Recent Urinalysis:  Recent Labs   Lab Test 07/27/24  0615   COLOR Light Yellow   APPEARANCE Turbid*   URINEGLC Negative   URINEBILI Negative   URINEKETONE Negative   SG 1.009   UBLD Negative   URINEPH 7.5*   PROTEIN Negative   NITRITE Negative   LEUKEST 250 Mark/uL*   RBCU 1   WBCU 13*         Pertinent Radiology     Radiology Results:   Chest and abdomen ct  1.  Wedge-shaped area of nonenhancement of the lateral midportion of the right kidney suggests an acute renal infarct. While there is slight narrowing at the origin of both renal arteries, no dissection or arterial clot visible. Consider delayed imaging.  2.  Incidental note is made of an 8 mm right breast nodule at the 12:00 just above the nipple. There is a mammo report from [place] 2003 that refers to right breast nodularity. It's unclear if this was adequately evaluated at that time as suggested.   Nonemergent follow-up with comparison to her prior mammograms is suggested when patient is stable.  3.  Incidental bilateral renal cysts also noted which need no follow-up.  4.  Colonic diverticulosis.  5.  Multinodular goiter with the largest nodule measuring 1.2 cm. This needs no follow-up. Reference given below.    Total time spent 70 min    Karen Street MD  [facility]   Phone: #[phone]

## 2024-07-27 NOTE — ED PROVIDER NOTES
EMERGENCY DEPARTMENT ENCOUNTER      NAME: Corazon Hester  AGE: 83 year old female  YOB: 1940  MRN: [id number]  EVALUATION DATE & TIME: 2024  6:01 AM    PCP: Blanca Gallagher    ED PROVIDER: Andrea Dasilva M.D.      Chief Complaint   Patient presents with    Back Pain         FINAL IMPRESSION:  1. Renal infarct (H24)          ED COURSE & MEDICAL DECISION MAKIN:04 AM I met with the patient to obtain patient history and performed a physical exam. Discussed plan for ED work up including potential diagnostic studies and interventions.  7:36 AM I spoke with radiology.   8:58 AM I spoke with Dr. Chopra from urology.  9:11 AM I spoke with the hospitalist Dr. Street about admission.          Pertinent Labs & Imaging studies reviewed. (See chart for details)  83 year old female presents to the Emergency Department for evaluation of flank pain.  Initially thought patient did have a UTI versus pyelonephritis versus kidney stone versus appendicitis or other intra-abdominal processes.  Blood work is reassuring.  Urine shows some white cells but is nitrite negative.  CT scan done to rule out any sort of intra-abdominal issue as well as aortic catastrophe showed possible renal infarct.  I spoke with the radiologist who wanted a repeat CT scan.  This was performed and does confirm a renal infarct.  No evidence of dissection or thrombus.  Patient's pain was fairly well-controlled with IV pain medicine here in the ER.  Remainder the blood work is reassuring.  Spoke with the urologist I do not think that any further urologic intervention was necessary but felt that the patient likely needed a thromboembolic workup.  Spoke with the hospitalist who admit the patient.  At this time I was holding off on any sort of anticoagulation pending further workup.    At the conclusion of the encounter I discussed the results of all of the tests and the disposition. The questions were answered. The patient or family  acknowledged understanding and was agreeable with the care plan.     ED Course as of 07/27/24 1044   Sat Jul 27, 2024   0735 Patient with possible renal infarct on CT scan.  Aorta looks fine no evidence of dissection of either the aorta nor renal arteries.  Spoke to the radiologist who requested delayed images to evaluate for perfusion.  This has been ordered.   0858 Spoke with urologist who reviewed images felt that from a urologic standpoint there may not be much to do but that the patient probably should be admitted for thromboembolic workup and echocardiogram.  Will call and speak to the hospitalist.           Medical Decision Making  Obtained supplemental history:Supplemental history obtained?: No  Reviewed external records: External records reviewed?: Outpatient Record: 10/29/2022, [facility] ED  Care impacted by chronic illness:Hypertension  Care significantly affected by social determinants of health:Access to Medical Care  Did you consider but not order tests?: Work up considered but not performed and documented in chart, if applicable  Did you interpret images independently?: Independent interpretation of ECG and images noted in documentation, when applicable.  Consultation discussion with other provider:Did you involve another provider (consultant, , pharmacy, etc.)?: I discussed the care with another health care provider, see documentation for details.  Admit.    This patient involved a high degree of complexity in medical decision making, as significant risks were present and assessed. Recent encounters & results in medical record reviewed by me.     All workup (i.e. any EKG/labs/imaging as per charting below) reviewed and independently interpreted by me. See respective sections for details.       minutes of critical care time     MEDICATIONS GIVEN IN THE EMERGENCY:  Medications   heparin 25,000 units in 0.45% NaCl 250 mL ANTICOAGULANT infusion (850 Units/hr Intravenous $New Bag 7/27/24 0980)    lidocaine 1 % 0.1-1 mL (has no administration in time range)   lidocaine (LMX4) cream (has no administration in time range)   sodium chloride (PF) 0.9% PF flush 3 mL (3 mLs Intracatheter Not Given 7/27/24 1002)   sodium chloride (PF) 0.9% PF flush 3 mL (has no administration in time range)   senna-docusate (SENOKOT-S/PERICOLACE) 8.6-50 MG per tablet 1 tablet (has no administration in time range)     Or   senna-docusate (SENOKOT-S/PERICOLACE) 8.6-50 MG per tablet 2 tablet (has no administration in time range)   Patient is already receiving anticoagulation with heparin, enoxaparin (LOVENOX), warfarin (COUMADIN)  or other anticoagulant medication (has no administration in time range)   acetaminophen (TYLENOL) tablet 650 mg (has no administration in time range)     Or   acetaminophen (TYLENOL) Suppository 650 mg (has no administration in time range)   ondansetron (ZOFRAN ODT) ODT tab 4 mg (has no administration in time range)     Or   ondansetron (ZOFRAN) injection 4 mg (has no administration in time range)   polyethylene glycol (MIRALAX) Packet 17 g (17 g Oral $Given 7/27/24 1004)   morphine (PF) injection 2 mg (2 mg Intravenous Not Given 7/27/24 0750)   ketorolac (TORADOL) injection 15 mg (15 mg Intravenous $Given 7/27/24 0624)   iopamidol (ISOVUE-370) solution 90 mL (90 mLs Intravenous $Given 7/27/24 0710)   heparin ANTICOAGULANT Loading dose for HIGH INTENSITY TREATMENT * Give BEFORE starting heparin infusion (3,900 Units Intravenous $Given 7/27/24 0958)       NEW PRESCRIPTIONS STARTED AT TODAY'S ER VISIT  New Prescriptions    No medications on file          =================================================================    HPI    Patient information was obtained from: patient     Use of : ANGEL        Corazon Hester is a 83 year old female with a pertinent history of hypertension who presents for evaluation of back pain and abdominal pain.     Patient presents to the ED for concerns of bilateral back  pain and right lower abdominal pain that started yesterday (7/26/24) right before dinner. She states that the pain started before dinner in her back. After she ate she sat down and started experiencing abdominal pain. The pain continued into the night and prevented her from sleeping. She stated that she took an advil with no relief and also had some gas with no relief. She also states that moving does not make the pain worse. Her pain is currently a 5-6/10. She mentions that she has the urge to have a BM but is unable to.     The patient denies any leg pain, numbness, tingling, dysuria, fever, nausea, and vomiting. Patient states no other complaints or concerns at this time.     Per Chart Review:  10/29/2022, [facility] ED: Patient presents to the ED for left-sided lower back pain that radiates into her leg and is worse at night. Initial exam notable for generally well-appearing patient who is ambulatory without a limp, has some tenderness near the left SI joint and lumbosacral paraspinal musculature.  Strength and sensation intact.  No red flags for cauda equina syndrome or epidural abscess.  I suspect likely sciatica versus SI joint dysfunction and will treat with Flexeril to help her sleep, gabapentin, continuation of anti-inflammatories which she is already doing, and a spine referral.  She is comfortable with this plan at time of discharge.       REVIEW OF SYSTEMS   Review of Systems   Constitutional:  Negative for fever.   Gastrointestinal:  Positive for abdominal pain. Negative for nausea and vomiting.   Genitourinary:  Negative for dysuria.   Musculoskeletal:  Positive for back pain.   All other systems reviewed and are negative.       PAST MEDICAL HISTORY:  Past Medical History:   Diagnosis Date    Duodenal ulcer, unspecified as acute or chronic, without hemorrhage, perforation, or obstruction 1950s?    GERD (gastroesophageal reflux disease)     Described as belching    HTN (hypertension) 2009    Tobacco  abuse        PAST SURGICAL HISTORY:  Past Surgical History:   Procedure Laterality Date    HC DILATION/CURETTAGE DIAG/THER NON OB      x3-4    HC REMOVAL OF TONSILS,<11 Y/O      STRIP VEIN  9-11    left leg           CURRENT MEDICATIONS:    amLODIPine (NORVASC) 5 MG tablet  aspirin (ASA) 81 MG EC tablet  BENADRYL 25 MG OR CAPS  CENTRUM SILVER OR TABS  FISH OIL 1000 MG OR CAPS  fluticasone (FLONASE) 50 MCG/ACT nasal spray  Ibuprofen (ADVIL PO)  Lactobacillus (PROBIOTIC ACIDOPHILUS PO)  latanoprost (XALATAN) 0.005 % ophthalmic solution  Multiple Vitamins-Minerals (PRESERVISION AREDS) TABS  Psyllium (METAMUCIL PO)  sodium chloride (OCEAN) 0.65 % nasal spray  tiZANidine (ZANAFLEX) 2 MG tablet  VITAMIN C 500 MG OR TABS        ALLERGIES:  Allergies   Allergen Reactions    Calcium GI Disturbance    Tetracycline Diarrhea       FAMILY HISTORY:  Family History   Problem Relation Age of Onset    Cancer Father         Bladder    Hypertension Sister        SOCIAL HISTORY:   Social History     Socioeconomic History    Marital status:     Number of children: 3   Occupational History     Employer: NONE    Tobacco Use    Smoking status: Every Day     Current packs/day: 0.25     Average packs/day: 0.3 packs/day for 50.0 years (12.5 ttl pk-yrs)     Types: Cigarettes     Passive exposure: Past    Smokeless tobacco: Never    Tobacco comments:     3-4 cigarettes per day   Vaping Use    Vaping status: Never Used   Substance and Sexual Activity    Alcohol use: Yes     Alcohol/week: 7.0 standard drinks of alcohol     Comment: 7 drinks per week    Drug use: No    Sexual activity: Yes     Partners: Male     Social Determinants of Health     Financial Resource Strain: Low Risk  (11/30/2023)    Financial Resource Strain     Within the past 12 months, have you or your family members you live with been unable to get utilities (heat, electricity) when it was really needed?: No   Food Insecurity: Low Risk  (11/30/2023)    Food Insecurity      Within the past 12 months, did you worry that your food would run out before you got money to buy more?: No     Within the past 12 months, did the food you bought just not last and you didn t have money to get more?: No   Transportation Needs: Low Risk  (11/30/2023)    Transportation Needs     Within the past 12 months, has lack of transportation kept you from medical appointments, getting your medicines, non-medical meetings or appointments, work, or from getting things that you need?: No   Interpersonal Safety: Low Risk  (6/20/2024)    Interpersonal Safety     Do you feel physically and emotionally safe where you currently live?: Yes     Within the past 12 months, have you been hit, slapped, kicked or otherwise physically hurt by someone?: No     Within the past 12 months, have you been humiliated or emotionally abused in other ways by your partner or ex-partner?: No   Housing Stability: Low Risk  (11/30/2023)    Housing Stability     Do you have housing? : Yes     Are you worried about losing your housing?: No       VITALS:  BP (!) 150/81   Pulse 63   Temp 97.8  F (36.6  C) (Oral)   Resp 17   Ht 1.524 m (5')   Wt 48.5 kg (107 lb)   LMP  (LMP Unknown)   SpO2 100%   BMI 20.90 kg/m        PHYSICAL EXAM    Constitutional: Well developed, Well nourished, NAD, GCS 15  HENT: Normocephalic, Atraumatic, Bilateral external ears normal, Oropharynx normal, mucous membranes moist, Nose normal. Neck-  Normal range of motion, No tenderness, Supple, No stridor.  Eyes: PERRL, EOMI, Conjunctiva normal, No discharge.   Respiratory: Normal breath sounds, No respiratory distress, No wheezing, Speaks full sentences easily. No cough.   Cardiovascular: Normal heart rate, Regular rhythm, No murmurs, No rubs, No gallops. Chest wall nontender.   GI:Soft, No masses. No rebound or guarding. RLQ pain   Musculoskeletal: 2+ DP pulses. No edema.No cyanosis, No clubbing. Good range of motion in all major joints. No tenderness to palpation  or major deformities noted. No pain with palpation of lower spinal lumbar region  Integument: Warm, Dry, No erythema, No rash. No petechiae.   Neurologic: Alert & oriented x 3,  CN 3-12 intact Normal motor function, Normal sensory function, No focal deficits noted. Normal gait. Normal finger to nose bilaterally  Psychiatric: Affect normal, Judgment normal, Mood normal. Cooperative.          LAB:  All pertinent labs reviewed and interpreted.  Labs Ordered and Resulted from Time of ED Arrival to Time of ED Departure   ROUTINE UA WITH MICROSCOPIC REFLEX TO CULTURE - Abnormal       Result Value    Color Urine Light Yellow      Appearance Urine Turbid (*)     Glucose Urine Negative      Bilirubin Urine Negative      Ketones Urine Negative      Specific Gravity Urine 1.009      Blood Urine Negative      pH Urine 7.5 (*)     Protein Albumin Urine Negative      Urobilinogen Urine <2.0      Nitrite Urine Negative      Leukocyte Esterase Urine 250 Mark/uL (*)     Bacteria Urine Few (*)     WBC Clumps Urine Present (*)     Mucus Urine Present (*)     RBC Urine 1      WBC Urine 13 (*)     Squamous Epithelials Urine 7 (*)     Hyaline Casts Urine 1     COMPREHENSIVE METABOLIC PANEL - Abnormal    Sodium 138      Potassium 3.9      Carbon Dioxide (CO2) 30 (*)     Anion Gap 6 (*)     Urea Nitrogen 8.5      Creatinine 0.71      GFR Estimate 84      Calcium 9.3      Chloride 102      Glucose 114 (*)     Alkaline Phosphatase 122      AST 24      ALT 6      Protein Total 7.0      Albumin 4.2      Bilirubin Total 1.0     LIPASE - Normal    Lipase 25     LACTIC ACID WHOLE BLOOD - Normal    Lactic Acid 0.7     HEMOGLOBIN A1C - Normal    Hemoglobin A1C 5.5     CBC WITH PLATELETS AND DIFFERENTIAL    WBC Count 5.8      RBC Count 4.72      Hemoglobin 15.1      Hematocrit 43.8      MCV 93      MCH 32.0      MCHC 34.5      RDW 13.2      Platelet Count 196      % Neutrophils 48      % Lymphocytes 38      % Monocytes 7      % Eosinophils 6      %  Basophils 1      % Immature Granulocytes 0      NRBCs per 100 WBC 0      Absolute Neutrophils 2.8      Absolute Lymphocytes 2.2      Absolute Monocytes 0.4      Absolute Eosinophils 0.3      Absolute Basophils 0.0      Absolute Immature Granulocytes 0.0      Absolute NRBCs 0.0     HEPARIN UNFRACTIONATED ANTI XA LEVEL   URINE CULTURE       RADIOLOGY:  Reviewed all pertinent imaging. Please see official radiology report.  CT Abdomen Pelvis w/o Contrast   Final Result   IMPRESSION:    Right renal infarct.      CTA Chest Abdomen Pelvis w Contrast   Final Result   IMPRESSION:   1.  Wedge-shaped area of nonenhancement of the lateral midportion of the right kidney suggests an acute renal infarct. While there is slight narrowing at the origin of both renal arteries, no dissection or arterial clot visible. Consider delayed imaging.       2.  Incidental note is made of an 8 mm right breast nodule at the 12:00 just above the nipple. There is a mammo report from [facility] Lee 2003 that refers to right breast nodularity. It's unclear if this was adequately evaluated at that time as suggested.    Nonemergent follow-up with comparison to her prior mammograms is suggested when patient is stable.   3.  Incidental bilateral renal cysts also noted which need no follow-up.   4.  Colonic diverticulosis.   5.  Multinodular goiter with the largest nodule measuring 1.2 cm. This needs no follow-up. Reference given below.   6.  Other noncritical findings as noted above.   7.  Findings discussed with Dr. Jensen Dasilva at 07 35.      REFERENCE:   Pavel HULL et al. Managing Incidental Thyroid Nodules Detected on Imaging: White Paper of the ACR Incidental Thyroid Findings Committee. JACR 2015; 12:143-150.      Incidental thyroid nodule detected on CT or MRI without suspicious findings. Applies to general population without limited life expectancy or significant comorbidities.      Age greater than or equal to 35 years   Less than 1.5 cm: No further  evaluation.   Greater than or equal to 1.5 cm: Evaluate with thyroid ultrasound.         Echocardiogram Complete    (Results Pending)       EKG:    Performed at: July 27, 2024, 06:26:04, [facility] ED    Impression: Sinus rhythm. Left axis deviation. Abnormal ECG. No previous ECG available.     Rate: 63 BPM  Rhythm: Sinus rhythm  Axis: 51 -38 36  TN Interval: 148 ms  QRS Interval: 80 ms   QTc Interval: 425 ms  ST Changes:   Comparison: No previous ECG available.    I have independently reviewed and interpreted the EKG(s) documented above.    PROCEDURES:         I, Yosef Montero, am serving as a scribe to document services personally performed by Dr. Andrea Dasilva based on my observation and the provider's statements to me. IAndrea MD attest that Yosef Montero is acting in a scribe capacity, has observed my performance of the services and has documented them in accordance with my direction.    Andrea Dasilva M.D.  Emergency Medicine  [facility] EMERGENCY ROOM  [address] [id number] [phone]  Dept: [phone]       Andrea Dasilva MD  07/27/24 1047

## 2024-07-27 NOTE — CONSULTS
"  RENAL CONSULT NOTE    REQUESTING PHYSICIAN: MD Yenifer    REASON FOR CONSULT: renal infarct     PLAN:  Agree with anticoag therapy, currently IV hep  Would consider addling antiplat, like Eliquis vs continued low dose ASA (though she was on ASA PTA)  Advised to stop smoking  Add low dose ACE, for BP management  Would hold off on further NSAID therapy (IV and oral) given some risk for LEANNE with infarct.   Awaiting ECHO, on cardiac monitor (no evidence of A-fib since admission)     ASSESSMENT/PLAN:  Right Renal infarct:   Wedge defect L lower pole, no hydro or obvious arterial occlusion, on IV hep  Normal baseline RF, no dissection or clot visible, no known h/o FMD, nothing to suggest sig HARRY by contrast CT (mild bilat HARRY reported) , would benefit from f/up  Does have baseline HTN, though unclear if well controlled?, some escalated BP here, adding ACE  Bilat renal cysts noted  Smoking, advanced age all risk factors though Etiol unclear, No h/o hypercoagulopathy or cardiac dysrhythmia; q plaque? ECHO ordered     Hypertension:  Pt reports that she is typically well controlled on single agent CCB, BP quite escalated since admission likely d/t infarction, adding low dose ACE And titrate up as needed able, while monitoring RF closely   PT Amlod as single agent  Advised to stop smoking.     Breast nodule:  NOS, f/up outpt        HPI: Corazon Hester is a 83 year old female for whom we have been asked to see for acute/spontaneous renal infact.  Admitted with abd pain.  Ct shows R wedge defect suggestive of infact.  Hep gtt was initiated.  Nothing to suggest hemodynamically significant  HARRY by contrast CT.  Discussed briefly with DR GERI Whitman, IR ,who would not rec PCI in this setting kaleb with stable RF (advanced age etc). She reports that after dinner last night she dev sudden R flank pain.  Had just smoked.  Then dev inc \"reflux\" symptoms.  This a.m. noted that the flank pain persisted, to 9/10 ranking.  Now 2/10    No " h/o coagulopathy in self or family.  She has been on ASA 81mg for years.  She is a daily smoker 4cigs/day reported.  No h/o known a-fib, ECHO  pending. Does have h/o PAC's ,SBP typically well controlled 120 range    RF normal at baseline.  No NSAIDs  Normal urine pattern.  Denies h/o CVA or MI    She lives alone.    Only takes probiotic OTC , NO nsaidS     REVIEW OF SYSTEMS:  COMPLETE REVIEW OF SYSTEMS: General: see HPI  Eyes: denies acute issues   Ears/Nose/Throat: denies issues  Cardiovascular: see HPI  Respiratory: denies SOB, SMITH  Gastrointestinal: no acute issues  Musculoskeletal: no acute issues  Skin: no rash  Neurologic: no changes   Psychiatric: no issues       Past Medical History:   Diagnosis Date    Duodenal ulcer, unspecified as acute or chronic, without hemorrhage, perforation, or obstruction 1950s?    GERD (gastroesophageal reflux disease)     Described as belching    HTN (hypertension) 2009    Tobacco abuse        I have reviewed this patient's family history and updated it with pertinent information if needed.  Family History   Problem Relation Age of Onset    Cancer Father         Bladder    Hypertension Sister          Social History     Tobacco Use    Smoking status: Every Day     Current packs/day: 0.25     Average packs/day: 0.3 packs/day for 50.0 years (12.5 ttl pk-yrs)     Types: Cigarettes     Passive exposure: Past    Smokeless tobacco: Never    Tobacco comments:     3-4 cigarettes per day   Vaping Use    Vaping status: Never Used   Substance Use Topics    Alcohol use: Yes     Alcohol/week: 7.0 standard drinks of alcohol     Comment: 7 drinks per week    Drug use: No          Current Facility-Administered Medications   Medication Dose Route Frequency Provider Last Rate Last Admin    acetaminophen (TYLENOL) tablet 650 mg  650 mg Oral Q4H PRN Karen Street MD        Or    acetaminophen (TYLENOL) Suppository 650 mg  650 mg Rectal Q4H PRN Karen Street MD        heparin 25,000 units in  0.45% NaCl 250 mL ANTICOAGULANT infusion  0-5,000 Units/hr Intravenous Continuous Karen Street MD 8.5 mL/hr at 07/27/24 0958 850 Units/hr at 07/27/24 0958    lidocaine (LMX4) cream   Topical Q1H PRN Karen Street MD        lidocaine 1 % 0.1-1 mL  0.1-1 mL Other Q1H PRN Karen Street MD        ondansetron (ZOFRAN ODT) ODT tab 4 mg  4 mg Oral Q6H PRN Karen Street MD        Or    ondansetron (ZOFRAN) injection 4 mg  4 mg Intravenous Q6H PRN Karen Street MD        Patient is already receiving anticoagulation with heparin, enoxaparin (LOVENOX), warfarin (COUMADIN)  or other anticoagulant medication   Does not apply Continuous PRN Karen Street MD        polyethylene glycol (MIRALAX) Packet 17 g  17 g Oral Daily Karen Street MD   17 g at 07/27/24 1004    senna-docusate (SENOKOT-S/PERICOLACE) 8.6-50 MG per tablet 1 tablet  1 tablet Oral BID PRN Karen Street MD        Or    senna-docusate (SENOKOT-S/PERICOLACE) 8.6-50 MG per tablet 2 tablet  2 tablet Oral BID PRN Karen Street MD        sodium chloride (PF) 0.9% PF flush 3 mL  3 mL Intracatheter Q8H Karen Street MD        sodium chloride (PF) 0.9% PF flush 3 mL  3 mL Intracatheter q1 min prn Karen Street MD         Current Outpatient Medications   Medication Sig Dispense Refill    amLODIPine (NORVASC) 5 MG tablet Take 1 tablet (5 mg) by mouth daily 90 tablet 3    aspirin (ASA) 81 MG EC tablet Take 81 mg by mouth every morning      BENADRYL 25 MG OR CAPS Take 1-2 capsules by mouth nightly as needed for sleep  0    CENTRUM SILVER OR TABS Take 1 tablet by mouth daily      FISH OIL 1000 MG OR CAPS Take 1 g by mouth every morning 0 0    fluticasone (FLONASE) 50 MCG/ACT nasal spray Spray 1 spray into both nostrils daily as needed for rhinitis or allergies      Ibuprofen (ADVIL PO) Take 200 mg by mouth every 6 hours as needed for moderate pain or fever      Lactobacillus (PROBIOTIC ACIDOPHILUS PO) Take 1 capsule by mouth daily      latanoprost  (XALATAN) 0.005 % ophthalmic solution Place 1 drop into both eyes at bedtime      Multiple Vitamins-Minerals (PRESERVISION AREDS) TABS Take 1 tablet by mouth daily      Psyllium (METAMUCIL PO) Take 1 packet by mouth every morning      sodium chloride (OCEAN) 0.65 % nasal spray Spray 1 spray into both nostrils daily as needed for congestion      tiZANidine (ZANAFLEX) 2 MG tablet Take 1 tablet (2 mg) by mouth 3 times daily as needed for muscle spasms 90 tablet 1    VITAMIN C 500 MG OR TABS Take 500 mg by mouth daily           ALLERGIES/SENSITIVITIES:  Allergies   Allergen Reactions    Calcium GI Disturbance    Tetracycline Diarrhea          PHYSICAL EXAM:  BP (!) 146/87   Pulse 60   Temp 98.1  F (36.7  C) (Oral)   Resp 16   Ht 1.524 m (5')   Wt 48.5 kg (107 lb)   LMP  (LMP Unknown)   SpO2 98%   BMI 20.90 kg/m      Patient Vitals for the past 72 hrs:   Weight   07/27/24 0611 48.5 kg (107 lb)     Body mass index is 20.9 kg/m .  No intake or output data in the 24 hours ending 07/27/24 1338      General - A & O x 3, NAD  HEENT - PERRLA, no scleral icterus  Neck - no carotid bruits, no JVD  Respiratory - Lungs CTA bilat without wheeze, rhonchi, rales  Cardiovascular - AP RRR, -160s  Abdomen - soft,   Extremities - well perfused, no edema  Integumentary - intact, good turgor, no rash/lesions  Neurologic - grossly intact  Psych:  Judgement intact, affect WNL  :  voiding     Laboratory:  Recent Labs   Lab Test 07/27/24  0619 05/10/21  0943   WBC 5.8 5.3   HGB 15.1 15.6   MCV 93 96    215      Recent Labs   Lab Test 07/27/24  0619 06/20/24  1005   POTASSIUM 3.9 4.7   CHLORIDE 102 101   BUN 8.5 8.8      Recent Labs   Lab Test 07/27/24  0619 07/27/24  0615 06/28/23  1036   ALBUMIN 4.2  --  4.1   BILITOTAL 1.0  --  0.9   ALT 6  --  15   AST 24  --  22   PROTEIN  --  Negative  --        Personally reviewed today's laboratory studies      Thank you for involving us in the care of this patient. We will  continue to follow along with you.      Trini Dillard, FNP-BC  [facility]  [phone]

## 2024-07-28 ENCOUNTER — APPOINTMENT (OUTPATIENT)
Dept: CARDIOLOGY | Facility: CLINIC | Age: 84
End: 2024-07-28
Attending: FAMILY MEDICINE
Payer: COMMERCIAL

## 2024-07-28 VITALS
SYSTOLIC BLOOD PRESSURE: 116 MMHG | DIASTOLIC BLOOD PRESSURE: 75 MMHG | TEMPERATURE: 98.4 F | BODY MASS INDEX: 21.1 KG/M2 | WEIGHT: 107.5 LBS | OXYGEN SATURATION: 97 % | HEART RATE: 68 BPM | HEIGHT: 60 IN | RESPIRATION RATE: 16 BRPM

## 2024-07-28 PROBLEM — I10 PRIMARY HYPERTENSION: Status: ACTIVE | Noted: 2023-11-30

## 2024-07-28 LAB
ANION GAP SERPL CALCULATED.3IONS-SCNC: 5 MMOL/L (ref 7–15)
BACTERIA UR CULT: NORMAL
BUN SERPL-MCNC: 10.1 MG/DL (ref 8–23)
CALCIUM SERPL-MCNC: 9.1 MG/DL (ref 8.8–10.4)
CHLORIDE SERPL-SCNC: 102 MMOL/L (ref 98–107)
CREAT SERPL-MCNC: 0.65 MG/DL (ref 0.51–0.95)
EGFRCR SERPLBLD CKD-EPI 2021: 87 ML/MIN/1.73M2
ERYTHROCYTE [DISTWIDTH] IN BLOOD BY AUTOMATED COUNT: 13.1 % (ref 10–15)
GLUCOSE SERPL-MCNC: 103 MG/DL (ref 70–99)
HCO3 SERPL-SCNC: 29 MMOL/L (ref 22–29)
HCT VFR BLD AUTO: 39.9 % (ref 35–47)
HGB BLD-MCNC: 14 G/DL (ref 11.7–15.7)
LVEF ECHO: NORMAL
MCH RBC QN AUTO: 32.6 PG (ref 26.5–33)
MCHC RBC AUTO-ENTMCNC: 35.1 G/DL (ref 31.5–36.5)
MCV RBC AUTO: 93 FL (ref 78–100)
PLATELET # BLD AUTO: 172 10E3/UL (ref 150–450)
POTASSIUM SERPL-SCNC: 3.9 MMOL/L (ref 3.4–5.3)
RBC # BLD AUTO: 4.29 10E6/UL (ref 3.8–5.2)
SODIUM SERPL-SCNC: 136 MMOL/L (ref 135–145)
UFH PPP CHRO-ACNC: 0.56 IU/ML
WBC # BLD AUTO: 7.1 10E3/UL (ref 4–11)

## 2024-07-28 PROCEDURE — C8929 TTE W OR WO FOL WCON,DOPPLER: HCPCS

## 2024-07-28 PROCEDURE — 80048 BASIC METABOLIC PNL TOTAL CA: CPT | Performed by: FAMILY MEDICINE

## 2024-07-28 PROCEDURE — 99239 HOSP IP/OBS DSCHRG MGMT >30: CPT | Performed by: INTERNAL MEDICINE

## 2024-07-28 PROCEDURE — 250N000013 HC RX MED GY IP 250 OP 250 PS 637: Performed by: NURSE PRACTITIONER

## 2024-07-28 PROCEDURE — 255N000002 HC RX 255 OP 636: Performed by: INTERNAL MEDICINE

## 2024-07-28 PROCEDURE — 99232 SBSQ HOSP IP/OBS MODERATE 35: CPT | Performed by: NURSE PRACTITIONER

## 2024-07-28 PROCEDURE — 36415 COLL VENOUS BLD VENIPUNCTURE: CPT | Performed by: FAMILY MEDICINE

## 2024-07-28 PROCEDURE — 250N000013 HC RX MED GY IP 250 OP 250 PS 637: Performed by: INTERNAL MEDICINE

## 2024-07-28 PROCEDURE — G0378 HOSPITAL OBSERVATION PER HR: HCPCS

## 2024-07-28 PROCEDURE — 96366 THER/PROPH/DIAG IV INF ADDON: CPT

## 2024-07-28 PROCEDURE — 250N000013 HC RX MED GY IP 250 OP 250 PS 637: Performed by: FAMILY MEDICINE

## 2024-07-28 PROCEDURE — 93306 TTE W/DOPPLER COMPLETE: CPT | Mod: 26 | Performed by: INTERNAL MEDICINE

## 2024-07-28 PROCEDURE — 85014 HEMATOCRIT: CPT | Performed by: FAMILY MEDICINE

## 2024-07-28 PROCEDURE — 85520 HEPARIN ASSAY: CPT | Performed by: FAMILY MEDICINE

## 2024-07-28 RX ORDER — LISINOPRIL 2.5 MG/1
2.5 TABLET ORAL DAILY
Qty: 30 TABLET | Refills: 0 | Status: SHIPPED | OUTPATIENT
Start: 2024-07-29

## 2024-07-28 RX ADMIN — LISINOPRIL 2.5 MG: 2.5 TABLET ORAL at 07:34

## 2024-07-28 RX ADMIN — ASPIRIN 81 MG: 81 TABLET, COATED ORAL at 07:34

## 2024-07-28 RX ADMIN — PERFLUTREN 2 ML: 6.52 INJECTION, SUSPENSION INTRAVENOUS at 09:38

## 2024-07-28 RX ADMIN — APIXABAN 10 MG: 5 TABLET, FILM COATED ORAL at 10:54

## 2024-07-28 RX ADMIN — AMLODIPINE BESYLATE 5 MG: 5 TABLET ORAL at 07:34

## 2024-07-28 RX ADMIN — POLYETHYLENE GLYCOL 3350 17 G: 17 POWDER, FOR SOLUTION ORAL at 07:34

## 2024-07-28 ASSESSMENT — ACTIVITIES OF DAILY LIVING (ADL)
ADLS_ACUITY_SCORE: 20

## 2024-07-28 NOTE — PROGRESS NOTES
MD placed discharge orders at 12:08 pm.   Patient changed to Observation at 1:39 pm.   CM notified of change at 1:51 pm.  CM attempted to meet with patient and discuss KEVIN. Patient had been discharge from the hospital. 2:16 PM

## 2024-07-28 NOTE — PROGRESS NOTES
RENAL PROGRESS NOTE    REASON FOR CONSULT: renal infarct           PLAN:  Ok to discharge from renal perspective Eliquis BID per [facility], likely 3 months, then defer addtl w/up to PCP   Advised to stop smoking  Cont low dose ACE for discharge, for BP/renal infact, monitor home BP's     ASSESSMENT/PLAN:  Right Renal infarct:   Wedge defect L lower pole, no hydro or obvious arterial occlusion, stable RF and BP better today  on IV hep  Normal baseline RF, no dissection or clot visible, no known h/o FMD, nothing to suggest sig HARRY by contrast CT (mild bilat HARRY reported) , would benefit from f/up imgaging in 3-6 mos (MRI vs CTA)  Does have baseline HTN,reportedly well controlledPTA, some escalated BP here, adding ACE  Bilat renal cysts noted  Smoking, advanced age all risk factors though Etiol unclear, No h/o hypercoagulopathy or unrecognized cardiac dysrhythmia PTA (nothing seen here); q plaque? ECHO looked ok     Hypertension:  Better today with ACE  Pt reports that she is typically well controlled on single agent CCB, BP escalated on admission likely d/t infarction,   PT Amlod as single agent  Advised to stop smoking.      Breast nodule:  NOS, unclear significant, reportedly has hx, f/up outpt per PCP     SUBJECTIVE:  doing ok,  Walking halls, no pain  Urinating, has appetite  Wanting to go home  Discussed with DR Mackenzie, plans to discharge today, and f/up with PCP  Discussed no smoking.  Monitor BP and if HoTNive, cut back on Amlod 5-->2.5 and or discontinue ACE, defer to outpt management.  Some benefit of keeping on ACE with renal infarct     OBJECTIVE:  Physical Exam   Temp: 98.4  F (36.9  C) Temp src: Oral BP: 124/82 Pulse: 66   Resp: 16 SpO2: 95 % O2 Device: None (Room air)    Vitals:    07/27/24 0611 07/27/24 1609 07/28/24 0618   Weight: 48.5 kg (107 lb) 47.9 kg (105 lb 11.2 oz) 48.8 kg (107 lb 8 oz)     Vital Signs with Ranges  Temp:  [97.2  F (36.2  C)-98.4  F (36.9  C)] 98.4  F (36.9  C)  Pulse:  [60-82]  66  Resp:  [16-20] 16  BP: (113-161)/(70-87) 124/82  SpO2:  [95 %-98 %] 95 %  I/O last 3 completed shifts:  In: -   Out: 750 [Urine:750]    Temp (24hrs), Av  F (36.7  C), Min:97.2  F (36.2  C), Max:98.4  F (36.9  C)      Patient Vitals for the past 72 hrs:   Weight   24 0618 48.8 kg (107 lb 8 oz)   24 1609 47.9 kg (105 lb 11.2 oz)   24 0611 48.5 kg (107 lb)     Intake/Output Summary (Last 24 hours) at 2024 1128  Last data filed at 2024 0358  Gross per 24 hour   Intake --   Output 750 ml   Net -750 ml       PHYSICAL EXAM:  General - Alert and oriented x3, appears comfortable, NAD, walking in halls with daughter Alondra  Cardiovascular - Rate and SBP well controled   Respiratory - on RA   Abd: BS present, no guarding or pain with palpation, no ascites  Extremities - No lower extremity edema bilaterally  Skin: dry, intact, no rash, good turgor  Neuro:  Grossly intact, no focal deficits  MSK:  Grossly intact  Psych:  Normal affect    LABORATORY STUDIES:     Recent Labs   Lab 24  0558 24  0619   WBC 7.1 5.8   RBC 4.29 4.72   HGB 14.0 15.1   HCT 39.9 43.8    196       Basic Metabolic Panel:  Recent Labs   Lab 24  0558 24  0619    138   POTASSIUM 3.9 3.9   CHLORIDE 102 102   CO2 29 30*   BUN 10.1 8.5   CR 0.65 0.71   * 114*   KE 9.1 9.3       INRNo lab results found in last 7 days.     Recent Labs   Lab Test 24  0558 24   WBC 7.1 5.8   HGB 14.0 15.1    196       Personally reviewed current labs      Trini Dillard, [facility]-BC  [facility]  [phone]

## 2024-07-28 NOTE — PLAN OF CARE
"Pt A&Ox4, VSS, RA, Afebrile.  Pt denies any pain, SOB or n/v.  Pt SBA with wgb, d/t IV pole  Tolerating diet  PIV patent & infusing heparin see rate in MAR, recheck 6/28  Pt care ongoing, call light within reach, bed in lowest position, alarms on for safety. Discharge pending.      Problem: Adult Inpatient Plan of Care  Goal: Plan of Care Review  Description: The Plan of Care Review/Shift note should be completed every shift.  The Outcome Evaluation is a brief statement about your assessment that the patient is improving, declining, or no change.  This information will be displayed automatically on your shift  note.  Outcome: Progressing  Goal: Patient-Specific Goal (Individualized)  Description: You can add care plan individualizations to a care plan. Examples of Individualization might be:  \"Parent requests to be called daily at 9am for status\", \"I have a hard time hearing out of my right ear\", or \"Do not touch me to wake me up as it startles  me\".  Outcome: Progressing  Goal: Absence of Hospital-Acquired Illness or Injury  Outcome: Progressing  Intervention: Identify and Manage Fall Risk  Recent Flowsheet Documentation  Taken 7/28/2024 0100 by Cuca Higginbotham, RN  Safety Promotion/Fall Prevention: safety round/check completed  Taken 7/28/2024 0000 by Cuca Higginbotham RN  Safety Promotion/Fall Prevention: safety round/check completed  Taken 7/27/2024 2300 by Cuca Higginbotham, RN  Safety Promotion/Fall Prevention: safety round/check completed  Taken 7/27/2024 2200 by Cuca Higginbotham, RN  Safety Promotion/Fall Prevention: safety round/check completed  Taken 7/27/2024 2100 by Cuca Higginbotham, RN  Safety Promotion/Fall Prevention: safety round/check completed  Taken 7/27/2024 1900 by Cuca Higginbotham, RN  Safety Promotion/Fall Prevention: safety round/check completed  Intervention: Prevent Skin Injury  Recent Flowsheet Documentation  Taken 7/28/2024 0100 by Alphonse Delacruz" Cuca ESCUDERO RN  Body Position: position changed independently  Taken 7/27/2024 2015 by Cuca Higginbotham RN  Body Position: position changed independently  Intervention: Prevent and Manage VTE (Venous Thromboembolism) Risk  Recent Flowsheet Documentation  Taken 7/28/2024 0100 by Cuca Higginbotham RN  VTE Prevention/Management: SCDs off (sequential compression devices)  Taken 7/27/2024 2015 by Cuca Higginbotham RN  VTE Prevention/Management: SCDs off (sequential compression devices)  Intervention: Prevent Infection  Recent Flowsheet Documentation  Taken 7/28/2024 0100 by Cuca Higginbotham RN  Infection Prevention:   hand hygiene promoted   equipment surfaces disinfected   rest/sleep promoted  Taken 7/27/2024 2015 by Cuca Higginbotham RN  Infection Prevention:   hand hygiene promoted   equipment surfaces disinfected   rest/sleep promoted  Goal: Optimal Comfort and Wellbeing  Outcome: Progressing  Goal: Readiness for Transition of Care  Outcome: Progressing     Problem: Risk for Delirium  Goal: Optimal Coping  Outcome: Progressing  Goal: Improved Behavioral Control  Outcome: Progressing  Intervention: Minimize Safety Risk  Recent Flowsheet Documentation  Taken 7/28/2024 0100 by Cuca Higginbotham RN  Enhanced Safety Measures: room near unit station  Taken 7/27/2024 2015 by Cuca Higginbotham RN  Enhanced Safety Measures: room near unit station  Goal: Improved Attention and Thought Clarity  Outcome: Progressing  Goal: Improved Sleep  Outcome: Progressing     Problem: Pain Acute  Goal: Optimal Pain Control and Function  Outcome: Progressing  Intervention: Prevent or Manage Pain  Recent Flowsheet Documentation  Taken 7/28/2024 0100 by Cuca Higginbotham RN  Medication Review/Management: medications reviewed  Taken 7/27/2024 2015 by Cuca Higginbotham RN  Medication Review/Management: medications reviewed

## 2024-07-28 NOTE — PLAN OF CARE
Goal Outcome Evaluation:    Patient cleared for discharge, IV removed. Education given, specifically about Eliquis, and practices while on blood thinners. Questions answered.

## 2024-07-28 NOTE — DISCHARGE SUMMARY
[facility] MEDICINE  DISCHARGE SUMMARY     Primary Care Physician: Blanca Gallagher  Admission Date: 7/27/2024   Discharge Provider: Yanci Mackenzie MD Discharge Date: 7/28/2024   Diet: Orders Placed This Encounter      Regular Diet Adult      Diet      Code Status: No CPR- Do NOT Intubate   Activity: activity as tolerated   [facility]      Condition at Discharge: Stable      REASON FOR PRESENTATION(See Admission Note for Details)   Right flank pain    PRINCIPAL & ACTIVE DISCHARGE DIAGNOSES     Active Problems:    Renal infarct (H24)  Mild renal artery stenosis  Right breast nodule 8 mm will need follow-up as outpatient  Multinodular goiter follow-up outpatient  Bilateral renal cysts  Hypertension  Tobacco abuse    SIGNIFICANT FINDINGS (Imaging, labs):     Most Recent 3 CBC's:  Recent Labs   Lab Test 07/28/24  0558 07/27/24  0619 05/10/21  0943   WBC 7.1 5.8 5.3   HGB 14.0 15.1 15.6   MCV 93 93 96    196 215      Most Recent 3 BMP's:  Recent Labs   Lab Test 07/28/24  0558 07/27/24  0619 06/20/24  1005    138 136   POTASSIUM 3.9 3.9 4.7   CHLORIDE 102 102 101   CO2 29 30* 27   BUN 10.1 8.5 8.8   CR 0.65 0.71 0.73   ANIONGAP 5* 6* 8   KE 9.1 9.3 9.6   * 114* 104*     Most Recent 2 LFT's:  Recent Labs   Lab Test 07/27/24  0619 06/28/23  1036   AST 24 22   ALT 6 15   ALKPHOS 122 127*   BILITOTAL 1.0 0.9       Recent Labs   Lab Test 07/27/24  1541 07/27/24  0619   TSH 1.52  --    A1C  --  5.5     Results for orders placed or performed during the hospital encounter of 07/27/24   CTA Chest Abdomen Pelvis w Contrast    Narrative    EXAM: CTA CHEST ABDOMEN PELVIS W CONTRAST  LOCATION: [facility]  DATE: 7/27/2024    INDICATION: abd pain radiiating to back  COMPARISON: None.  TECHNIQUE: CT angiogram chest abdomen pelvis during arterial phase of injection of IV contrast. 2D and 3D MIP reconstructions were performed by the CT technologist. Dose reduction  techniques were used.   CONTRAST: ISOVUE 370 90ML    FINDINGS:   CT ANGIOGRAM CHEST, ABDOMEN, AND PELVIS: No evidence of aortic aneurysm, intramural hematoma or dissection. No mediastinal hematoma. Mesenteric vessels are patent. There are single bilateral renal arteries. There is mild narrowing at the renal artery   origin but no significant stenosis. No renal artery dissection. OFELIA is patent. No iliac artery aneurysms.    LUNGS AND PLEURA: No effusions or pulmonary nodules. Slight thickening of the minor fissure. Emphysema.    MEDIASTINUM/AXILLAE: Multi nodular goiter with largest nodule on the right measuring 1.2 cm. No adenopathy. Cardiac chambers are unremarkable by CT.    CORONARY ARTERY CALCIFICATION: Mild.    HEPATOBILIARY: Normal.    PANCREAS: Normal.    SPLEEN: Normal.    ADRENAL GLANDS: Normal.    KIDNEYS/BLADDER: There is a focal, wedge-shaped area of nonenhancement of the inferior lateral aspect of the right kidney without any perinephric stranding (coronal image 40, axial images 327-350). Bilateral renal and parapelvic cysts are noted which   need no follow-up.    BOWEL: Quite redundant colon with a moderate stool burden. No inflammatory changes. Diffuse colonic diverticulosis.    LYMPH NODES: Normal.    PELVIC ORGANS: Calcified posterior left subserosal fibroid. No adnexal masses.    MUSCULOSKELETAL: There is an 8 mm nodule at the 12:00 position of the right breast, just above the nipple (axial image 151, sagittal image 21). S-shaped thoracolumbar scoliosis with the lumbar curve convex to the left at L1-2. Moderate spondylitic   changes throughout. No concerning bone lesions.      Impression    IMPRESSION:  1.  Wedge-shaped area of nonenhancement of the lateral midportion of the right kidney suggests an acute renal infarct. While there is slight narrowing at the origin of both renal arteries, no dissection or arterial clot visible. Consider delayed imaging.     2.  Incidental note is made of an 8 mm  right breast nodule at the 12:00 just above the nipple. There is a mammo report from [place] 2003 that refers to right breast nodularity. It's unclear if this was adequately evaluated at that time as suggested.   Nonemergent follow-up with comparison to her prior mammograms is suggested when patient is stable.  3.  Incidental bilateral renal cysts also noted which need no follow-up.  4.  Colonic diverticulosis.  5.  Multinodular goiter with the largest nodule measuring 1.2 cm. This needs no follow-up. Reference given below.  6.  Other noncritical findings as noted above.  7.  Findings discussed with Dr. Jensen Dasilva at 07 35.    REFERENCE:  Pavel HULL et al. Managing Incidental Thyroid Nodules Detected on Imaging: White Paper of the ACR Incidental Thyroid Findings Committee. JACR 2015; 12:143-150.    Incidental thyroid nodule detected on CT or MRI without suspicious findings. Applies to general population without limited life expectancy or significant comorbidities.    Age greater than or equal to 35 years  Less than 1.5 cm: No further evaluation.  Greater than or equal to 1.5 cm: Evaluate with thyroid ultrasound.     CT Abdomen Pelvis w/o Contrast    Narrative    EXAM: CT ABDOMEN PELVIS W/O CONTRAST  LOCATION: [facility]  DATE: 7/27/2024    INDICATION: Abdominal pain radiating to back. Additional delayed images to reevaluate the right kidney for infarct.  COMPARISON: CTA chest abdomen pelvis earlier today  TECHNIQUE: CT scan of the abdomen and pelvis was performed without IV contrast. Multiplanar reformats were obtained. Dose reduction techniques were used.  CONTRAST: None.    FINDINGS:   LOWER CHEST: Minimal reticular scarring or atelectasis dense mitral annulus calcification.    HEPATOBILIARY: Normal.    PANCREAS: Normal.    SPLEEN: Normal.    ADRENAL GLANDS: Normal.    KIDNEYS/BLADDER: Redemonstrated focal wedge-shaped area of nonenhancement involving the lower right kidney. There is a  very thin rim of cortex overlying this area which appears to enhance. Findings are consistent with renal infarct. No hydronephrosis.   Benign bilateral renal cysts, no follow-up needed.    BOWEL: Severe diffuse colonic diverticulosis.    LYMPH NODES: Normal.    VASCULATURE: Moderate atherosclerotic calcifications. Normal caliber abdominal aorta.    PELVIC ORGANS: Calcified uterine fibroid.    MUSCULOSKELETAL: Bony demineralization. Moderate scattered degenerative changes in the spine.      Impression    IMPRESSION:   Right renal infarct.   Echocardiogram Complete     Value    LVEF  > 65%    Narrative    [id number]  [facility]  [id number]  [id number]^[place] [address]     Name: FENG SANCHEZ  MRN: [id number]  : 1940  Study Date: 2024 08:10 AM  Age: 83 yrs  Gender: Female  Patient Location: [facility]  Reason For Study: Other, Please Specify in Comments  Ordering Physician: REGINO YUAN  Performed By: ALYSA     BSA: 1.4 m2  Height: 60 in  Weight: 107 lb  HR: 71  BP: 128/76 mmHg  ______________________________________________________________________________  Procedure  Complete Portable Echo Adult. Definity (NDC #26686-995) given intravenously.  Technically difficult study. There is no comparison study available.  ______________________________________________________________________________  Interpretation Summary     Left ventricular size, wall motion and function are normal. The ejection  fraction is > 65%.  Normal right ventricle size and systolic function.  Normal left atrial size.  Sclerodegenerative valve disease is present without hemodynamically  significant stenosis or regurgitation.  There is no comparison study available.  ______________________________________________________________________________  Left Ventricle  Left ventricular size, wall motion and function are normal. The ejection  fraction is > 65%. There is normal left ventricular  wall thickness. Left  ventricular diastolic function is normal.     Right Ventricle  Normal right ventricle size and systolic function.     Atria  Normal left atrial size. Right atrial size is normal. There is no color  Doppler evidence of an atrial shunt.     Mitral Valve  There is mild mitral annular calcification. There is physiologic mitral  regurgitation. There is no mitral valve stenosis.     Tricuspid Valve  Tricuspid valve leaflets appear normal. Right ventricle systolic pressure  estimate normal. The right ventricular systolic pressure is approximated at  19.2 mmHg plus the right atrial pressure. There is mild (1+) tricuspid  regurgitation.     Aortic Valve  The aortic valve is trileaflet with aortic valve sclerosis. There is mild (1+)  aortic regurgitation. No aortic stenosis is present.     Pulmonic Valve  The pulmonic valve is not well seen, but is grossly normal. This degree of  valvular regurgitation is within normal limits. There is trace pulmonic  valvular regurgitation.     Vessels  The aorta root is normal. Normal size ascending aorta. IVC diameter <2.1 cm  collapsing >50% with sniff suggests a normal RA pressure of 3 mmHg.     Pericardium  There is no pericardial effusion.     Rhythm  Sinus rhythm was noted.  ______________________________________________________________________________  MMode/2D Measurements & Calculations     IVSd: 1.0 cm  LVIDd: 2.8 cm  LVIDs: 2.0 cm  LVPWd: 0.97 cm  FS: 28.6 %  LV mass(C)d: 74.4 grams  LV mass(C)dI: 52.0 grams/m2  Ao root diam: 2.5 cm  asc Aorta Diam: 2.9 cm  LVOT diam: 1.9 cm  LVOT area: 2.8 cm2  Ao root diam index Ht(cm/m): 1.6  Ao root diam index BSA (cm/m2): 1.7  Asc Ao diam index BSA (cm/m2): 2.0  Asc Ao diam index Ht(cm/m): 1.9  LA Volume (BP): 33.4 ml     LA Volume Index (BP): 23.4 ml/m2  LA Volume Indexed (AL/bp): 25.9 ml/m2  RV Base: 2.9 cm  RWT: 0.69  TAPSE: 1.8 cm     Doppler Measurements & Calculations  MV E max jesse: 67.4 cm/sec  MV A max jesse:  123.0 cm/sec  MV E/A: 0.55  MV max P.5 mmHg  MV mean P.0 mmHg  MV V2 VTI: 31.0 cm  MVA(VTI): 1.4 cm2  MV dec slope: 179.0 cm/sec2  MV dec time: 0.38 sec  Ao V2 max: 142.5 cm/sec  Ao max P.0 mmHg  Ao V2 mean: 93.9 cm/sec  Ao mean P.0 mmHg  Ao V2 VTI: 25.8 cm  OJ(I,D): 1.7 cm2  OJ(V,D): 1.8 cm2  LV V1 max PG: 3.4 mmHg  LV V1 max: 91.7 cm/sec  LV V1 VTI: 15.7 cm  SV(LVOT): 44.5 ml  SI(LVOT): 31.1 ml/m2  PA V2 max: 82.7 cm/sec  PA max P.7 mmHg  PA acc time: 0.13 sec  TR max anam: 219.0 cm/sec  TR max P.2 mmHg  AV Anam Ratio (DI): 0.64  OJ Index (cm2/m2): 1.2  E/E': 9.5  E/E' av.6  Lateral E/e': 7.8  Medial E/e': 9.5  Peak E' Anam: 7.1 cm/sec  RV S Anam: 17.6 cm/sec     ______________________________________________________________________________  Report approved by: Glendy Rodriguez 2024 11:58 AM              PENDING LABS         PROCEDURES ( this hospitalization only)          RECOMMENDATION FOR F/U VISIT       DISPOSITION     Home    SUMMARY OF HOSPITAL COURSE:    83 year old  female with essential hypertension, ongoing tobacco abuse came with right flank pain started 1 day prior to admission developed sudden onset of right flank pain which is radiating to the right groin.  No urinary symptoms.  CT scan of the abdomen pelvis was concerning for right renal infarct.  Unclear etiology, risk factors include age, smoking.  She was started on heparin drip.  Echocardiogram there was no evidence of embolic source, she remained in normal sinus rhythm.  Blood pressure was elevated, seen by nephrology, started on lisinopril.  She is discharging on Eliquis.  She will need hyper coagulable workup as outpatient.  Smoking cessation was encouraged.  She was feeling better.  Discharged home in stable condition.    Incidental findings, mild bilateral renal artery stenosis, bilateral renal cysts, breast nodule, multinodular goiter will need further workup as outpatient.        Discharge Medications  with Med changes:        Review of your medicines        START taking        Dose / Directions   apixaban ANTICOAGULANT 5 MG tablet  Commonly known as: ELIQUIS  Indication: DVT-PE Treatment      Start taking on: July 28, 2024  Take 2 tablets (10 mg) by mouth 2 times daily for 7 days, THEN 1 tablet (5 mg) 2 times daily for 60 days.  Quantity: 148 tablet  Refills: 0     lisinopril 2.5 MG tablet  Commonly known as: ZESTRIL  Used for: Primary hypertension      Dose: 2.5 mg  Start taking on: July 29, 2024  Take 1 tablet (2.5 mg) by mouth daily  Quantity: 30 tablet  Refills: 0            CONTINUE these medicines which have NOT CHANGED        Dose / Directions   amLODIPine 5 MG tablet  Commonly known as: NORVASC  Used for: Primary hypertension      Dose: 5 mg  Take 1 tablet (5 mg) by mouth daily  Quantity: 90 tablet  Refills: 3     aspirin 81 MG EC tablet  Commonly known as: ASA      Dose: 81 mg  Take 81 mg by mouth every morning  Refills: 0     Benadryl 25 MG capsule  Generic drug: diphenhydrAMINE      Dose: 1-2 capsule  Take 1-2 capsules by mouth nightly as needed for sleep  Refills: 0     fish oil-omega-3 fatty acids 1000 MG capsule      Dose: 1 g  Take 1 g by mouth every morning  Quantity: 0  Refills: 0     fluticasone 50 MCG/ACT nasal spray  Commonly known as: FLONASE      Dose: 1 spray  Spray 1 spray into both nostrils daily as needed for rhinitis or allergies  Refills: 0     latanoprost 0.005 % ophthalmic solution  Commonly known as: XALATAN      Dose: 1 drop  Place 1 drop into both eyes at bedtime  Refills: 0     METAMUCIL PO      Dose: 1 packet  Take 1 packet by mouth every morning  Refills: 0     * multivitamin tablet      Dose: 1 tablet  Take 1 tablet by mouth daily  Refills: 0     * PreserVision AREDS Tabs      Dose: 1 tablet  Take 1 tablet by mouth daily  Refills: 0     PROBIOTIC ACIDOPHILUS PO  Used for: Essential hypertension      Dose: 1 capsule  Take 1 capsule by mouth daily  Refills: 0     sodium chloride  0.65 % nasal spray  Commonly known as: OCEAN      Dose: 1 spray  Spray 1 spray into both nostrils daily as needed for congestion  Refills: 0     tiZANidine 2 MG tablet  Commonly known as: ZANAFLEX  Used for: Lumbar radiculitis      Dose: 2 mg  Take 1 tablet (2 mg) by mouth 3 times daily as needed for muscle spasms  Quantity: 90 tablet  Refills: 1     vitamin C 500 MG tablet  Commonly known as: ASCORBIC ACID      Dose: 500 mg  Take 500 mg by mouth daily  Refills: 0           * This list has 2 medication(s) that are the same as other medications prescribed for you. Read the directions carefully, and ask your doctor or other care provider to review them with you.                STOP taking      ADVIL PO                  Where to get your medicines        These medications were sent to [facility] [address] [place] [address] - [address]  [address], [facility] [address]      Phone: [phone]   apixaban ANTICOAGULANT 5 MG tablet  lisinopril 2.5 MG tablet              Rationale for medication changes:      Apixaban for right renal infarct  Lisinopril for hypertension      Consults   Nephrology      Immunizations given this encounter     [unfilled]      Discharge Procedure Orders   Reason for your hospital stay   Order Comments: Kidney infarct     Activity   Order Comments: Your activity upon discharge: activity as tolerated     Order Specific Question Answer Comments   Is discharge order? Yes      Discharge Instructions   Order Comments: Smoking cessation     Follow-up and recommended labs and tests    Order Comments: Follow up with primary care provider, Blanca Gallagher, within 7 days   Follow up with nephrology as recommended  Follow up with surgery for breast nodule     Diet   Order Comments: Follow this diet upon discharge: Orders Placed This Encounter      Regular Diet Adult     Order Specific Question Answer Comments   Is discharge order? Yes      Examination     Vital  Signs in last 24 hours:   Vital signs:  Temp: 98.4  F (36.9  C) Temp src: Oral BP: 116/75 Pulse: 68   Resp: 16 SpO2: 97 % O2 Device: None (Room air)   Height: 152.4 cm (5') Weight: 48.8 kg (107 lb 8 oz)  Estimated body mass index is 20.99 kg/m  as calculated from the following:    Height as of this encounter: 1.524 m (5').    Weight as of this encounter: 48.8 kg (107 lb 8 oz).       Pertinent positives on exam:  Abdomen: Soft nontender nondistended bowel sounds present no guarding or rigidity, no CVA tenderness    Please see EMR for more detailed significant labs, imaging, consultant notes etc.  Total time spent on discharge: > 35 minutes    Yanci Mackenzie MD   [facility] Hospitalist Service: Ph:[phone]    CC:Blanca Gallagher

## 2024-07-30 ENCOUNTER — PATIENT OUTREACH (OUTPATIENT)
Dept: FAMILY MEDICINE | Facility: CLINIC | Age: 84
End: 2024-07-30
Payer: COMMERCIAL

## 2024-07-30 NOTE — TELEPHONE ENCOUNTER
Bhumika Irizarry RN called patient on 7/30 and left a message to return call to the clinic for hospital/ED follow-up.    When patient returns call, please route to Casey Larsen RN.     Notes for RN in case of return call from patient or second attempt call:  Discharged on 7/28 from [facility].  ER or Hospital? Hospital from ED.   Diagnosis for visit: Renal infarct - observation   Other information if necessary: has F/U scheduled with PCP for 8/8.    Delete this note after TCM call documentation is completed.       Bhumika Irizarry RN  [facility]

## 2024-08-08 ENCOUNTER — OFFICE VISIT (OUTPATIENT)
Dept: FAMILY MEDICINE | Facility: CLINIC | Age: 84
End: 2024-08-08
Payer: COMMERCIAL

## 2024-08-08 VITALS
SYSTOLIC BLOOD PRESSURE: 134 MMHG | BODY MASS INDEX: 21.87 KG/M2 | WEIGHT: 112 LBS | OXYGEN SATURATION: 97 % | TEMPERATURE: 98.6 F | DIASTOLIC BLOOD PRESSURE: 78 MMHG | HEART RATE: 66 BPM | RESPIRATION RATE: 16 BRPM

## 2024-08-08 DIAGNOSIS — I10 PRIMARY HYPERTENSION: ICD-10-CM

## 2024-08-08 DIAGNOSIS — Z09 HOSPITAL DISCHARGE FOLLOW-UP: Primary | ICD-10-CM

## 2024-08-08 DIAGNOSIS — N28.0 RENAL INFARCT (H): ICD-10-CM

## 2024-08-08 DIAGNOSIS — E04.2 NON-TOXIC MULTINODULAR GOITER: ICD-10-CM

## 2024-08-08 DIAGNOSIS — N63.11 MASS OF UPPER OUTER QUADRANT OF RIGHT BREAST: ICD-10-CM

## 2024-08-08 PROCEDURE — G2211 COMPLEX E/M VISIT ADD ON: HCPCS | Performed by: FAMILY MEDICINE

## 2024-08-08 PROCEDURE — 99214 OFFICE O/P EST MOD 30 MIN: CPT | Performed by: FAMILY MEDICINE

## 2024-08-08 NOTE — PROGRESS NOTES
"  Assessment & Plan     (Z09) Hospital discharge follow-up  (primary encounter diagnosis)  Comment: pt is doing well with medication and no side effect. Right flank pain resolved. Pt will fly to [place] to take care of her son who will have hip replacement. Pt will stay there for one month. Denies fever, cough, sob, blood in urine, dysuria, abd pain, n/v/d/c.   Plan: continue current medication.     (N28.0) Renal infarct (H24)  Comment: pt has right renal infarct. She started anticoagulation therapy with eliquis and aspirin. No side effect, no active bleeding. .   Plan: Adult Nephrology  Referral        Strongly advise to follow-up with nephrologist. She agrees but she will be out of town for one month. She agrees to see nephrologist after she comes back. Fall precaution and if she has active bleeding she will call.     (E04.2) Non-toxic multinodular goiter  Comment: ct incidental finding. Normal tsh.     Plan: \"Multinodular goiter with the largest nodule measuring 1.2 cm. This needs no follow-up \"    (N63.11) Mass of upper outer quadrant of right breast  Comment: ct incidental finding. She is smoker.   Plan: MA Diagnostic Digital Bilateral, US Breast         Right Limited 1-3 Quadrants        Pt state she has the right breast mass for years and was told no need to follow-up. Pt declined to see surgeon at this moment. She agrees to do the mammogram and ultrasound but need to wait for one month after she comes back to [place]    (I10) Primary hypertension  Comment: bp reasonable controlled. She check bp at home sometimes, 120-130/70-80. Started lisinopril 2.5 mg at hospital   Plan: continue current medication and close monitor bp at home. If bp is < 100/60 she will hold amlodipine and lisinopril.         MED REC REQUIRED  Post Medication Reconciliation Status: discharge medications reconciled and changed, per note/orders    See Patient Instructions    Baltazar   Corazon is a 83 year old, presenting for the " following health issues:  ER F/U (WW 7/27) and Ear Problem (Right ear check, thinks that's there is a build up again )        8/8/2024    10:21 AM   Additional Questions   Roomed by Gen MILLY CMA     Ear Problem    History of Present Illness       Reason for visit:  Summary visit  Symptom onset:  1-2 weeks ago  Symptom intensity:  Mild  Symptom progression:  Improving  Had these symptoms before:  No    She eats 4 or more servings of fruits and vegetables daily.She consumes 1 sweetened beverage(s) daily.She exercises with enough effort to increase her heart rate 30 to 60 minutes per day.  She exercises with enough effort to increase her heart rate 4 days per week.   She is taking medications regularly.           Hospital Follow-up Visit:    Hospital/Nursing Home/ Rehab Facility: [facility]  Date of Admission: 7/27/2024  Date of Discharge: 7/28/2024  Reason(s) for Admission: renal infarct   Was the patient in the ICU or did the patient experience delirium during hospitalization?  No  Do you have any other stressors you would like to discuss with your provider? No    Problems taking medications regularly:  None  Medication changes since discharge: None  Problems adhering to non-medication therapy:  None    Summary of hospitalization:  [facility] discharge summary reviewed  Diagnostic Tests/Treatments reviewed.  Follow up needed: none  Other Healthcare Providers Involved in Patient s Care:         Specialist appointment - nephrologist   Update since discharge: resolved         Plan of care communicated with patient           Hypertension Follow-up    Do you check your blood pressure regularly outside of the clinic? Yes   Are you following a low salt diet? Yes  Are your blood pressures ever more than 140 on the top number (systolic) OR more   than 90 on the bottom number (diastolic), for example 140/90? No         Review of Systems  Constitutional, HEENT, cardiovascular,  pulmonary, gi and gu systems are negative, except as otherwise noted.      Objective    /78 (BP Location: Right arm, Patient Position: Sitting, Cuff Size: Adult Regular)   Pulse 66   Temp 98.6  F (37  C) (Oral)   Resp 16   Wt 50.8 kg (112 lb)   LMP  (LMP Unknown)   SpO2 97%   BMI 21.87 kg/m    Body mass index is 21.87 kg/m .  Physical Exam   GENERAL: alert and no distress  NECK: no adenopathy, no asymmetry, masses, or scars  RESP: lungs clear to auscultation - no rales, rhonchi or wheezes  CV: regular rate and rhythm, normal S1 S2, no S3 or S4, no murmur, click or rub, no peripheral edema  ABDOMEN: soft, nontender, no hepatosplenomegaly, no masses and bowel sounds normal  MS: no gross musculoskeletal defects noted, no edema            Signed Electronically by: Blanca Gallagher MD

## 2024-08-14 ENCOUNTER — TELEPHONE (OUTPATIENT)
Dept: NEPHROLOGY | Facility: CLINIC | Age: 84
End: 2024-08-14
Payer: COMMERCIAL

## 2024-08-14 NOTE — TELEPHONE ENCOUNTER
Attempted to schedule New pt appt per referral, unable to reach pt at this time, lvm including writer's direct phone number for call back to schedule.  Tanisha Jovel,  Nephrology Clinic Navigator  [facility]  Direct: [phone].

## 2024-08-19 ENCOUNTER — TELEPHONE (OUTPATIENT)
Dept: FAMILY MEDICINE | Facility: CLINIC | Age: 84
End: 2024-08-19
Payer: COMMERCIAL

## 2024-08-19 NOTE — TELEPHONE ENCOUNTER
Order/Referral Request    Who is requesting: patient    Orders being requested: referral    Reason service is needed/diagnosis: post hospital kidney follow up    When are orders needed by: asap    Has this been discussed with Provider: Yes    Does patient have a preference on a Group/Provider/Facility? [facility] [place] Fax [phone]   [phone]    Does patient have an appointment scheduled?: No    Where to send orders: Fax [phone]    Okay to leave a detailed message?: Yes at Home number on file [phone], secondary [phone]

## 2025-01-02 ENCOUNTER — TELEPHONE (OUTPATIENT)
Dept: FAMILY MEDICINE | Facility: CLINIC | Age: 85
End: 2025-01-02
Payer: COMMERCIAL

## 2025-01-02 DIAGNOSIS — N28.0 RENAL INFARCTION (H): Primary | ICD-10-CM

## 2025-01-02 NOTE — TELEPHONE ENCOUNTER
General Call     Reason for Call:  incoming labs     What are your questions or concerns:  outside lab orders     Who is requesting: [facility]    Lab orders requested:  renal function panel;lipids    Ordering provider: emory hudson     Requesting to fax back results to: [phone]     Notes on orders: prior to visit on 1/14/25    Location of orders: [facility]    Copy of orders? (Location): [facility] office

## 2025-01-09 ENCOUNTER — OFFICE VISIT (OUTPATIENT)
Dept: FAMILY MEDICINE | Facility: CLINIC | Age: 85
End: 2025-01-09
Payer: COMMERCIAL

## 2025-01-09 VITALS
WEIGHT: 113 LBS | HEART RATE: 76 BPM | SYSTOLIC BLOOD PRESSURE: 130 MMHG | DIASTOLIC BLOOD PRESSURE: 72 MMHG | BODY MASS INDEX: 22.19 KG/M2 | TEMPERATURE: 98 F | OXYGEN SATURATION: 99 % | HEIGHT: 60 IN | RESPIRATION RATE: 20 BRPM

## 2025-01-09 DIAGNOSIS — I10 PRIMARY HYPERTENSION: Primary | ICD-10-CM

## 2025-01-09 DIAGNOSIS — H61.23 BILATERAL IMPACTED CERUMEN: ICD-10-CM

## 2025-01-09 RX ORDER — APIXABAN 5 MG/1
5 TABLET, FILM COATED ORAL 2 TIMES DAILY
COMMUNITY
Start: 2024-12-16

## 2025-01-09 NOTE — PROGRESS NOTES
Assessment & Plan     (I10) Primary hypertension  (primary encounter diagnosis)  Comment: bp reasonable controlled. Pt takes medication as instructed and no side effect. She dose not check bp at home. Pt state occasionally in the morning after she gets up she feels mild lightheaded. After rest her symptom resolve. Denies cp, sob, palpitation, headache and blurry vision.   Plan: continue current medication, advise to check bp at home regularly plus anytime she does not feel good. If bp is < 100/60 she will hold the medication.    (H61.23) Bilateral impacted cerumen  Comment: she felt right ear plugged for several days. No pain. Exam bilateral ear canal wax impacted  Plan: after the ear wash the ear canal clear and tm clear      The longitudinal plan of care for the diagnosis(es)/condition(s) as documented were addressed during this visit. Due to the added complexity in care, I will continue to support Corazon in the subsequent management and with ongoing continuity of care.            Subjective   Corazon is a 84 year old, presenting for the following health issues:  Ear Problem (Would like ears checked and possibly clean out, slight trouble hearing out of right ear and previous wax build up) and Dizziness (Episodes of dizziness mostly in the morning)        1/9/2025     9:59 AM   Additional Questions   Roomed by Gen MILLY CMA     Ear Problem    History of Present Illness       Reason for visit:  Ears washed out   She is taking medications regularly.           Hypertension Follow-up    Do you check your blood pressure regularly outside of the clinic? No   Are you following a low salt diet? Yes  Are your blood pressures ever more than 140 on the top number (systolic) OR more   than 90 on the bottom number (diastolic), for example 140/90? No        Review of Systems  Constitutional, HEENT, cardiovascular, pulmonary, gi and gu systems are negative, except as otherwise noted.      Objective    /72 (BP Location:  Left arm, Patient Position: Sitting, Cuff Size: Adult Regular)   Pulse 76   Temp 98  F (36.7  C) (Oral)   Resp 20   Ht 1.524 m (5')   Wt 51.3 kg (113 lb)   LMP  (LMP Unknown)   SpO2 99%   BMI 22.07 kg/m    Body mass index is 22.07 kg/m .  Physical Exam   GENERAL: alert and no distress  HENT: ear canals are impacted with wax., after ear wash ear canals are clear and TM's normal, nose and mouth without ulcers or lesions  NECK: no adenopathy, no asymmetry, masses, or scars  RESP: lungs clear to auscultation - no rales, rhonchi or wheezes  CV: regular rate and rhythm, normal S1 S2, no S3 or S4, no murmur, click or rub, no peripheral edema  ABDOMEN: soft, nontender, no hepatosplenomegaly, no masses and bowel sounds normal  MS: no gross musculoskeletal defects noted, no edema        Signed Electronically by: Blanca Gallagher MD

## 2025-04-15 ENCOUNTER — OFFICE VISIT (OUTPATIENT)
Dept: FAMILY MEDICINE | Facility: CLINIC | Age: 85
End: 2025-04-15
Payer: COMMERCIAL

## 2025-04-15 VITALS
WEIGHT: 112.6 LBS | OXYGEN SATURATION: 96 % | HEIGHT: 60 IN | BODY MASS INDEX: 22.1 KG/M2 | DIASTOLIC BLOOD PRESSURE: 66 MMHG | TEMPERATURE: 98.3 F | SYSTOLIC BLOOD PRESSURE: 100 MMHG | HEART RATE: 87 BPM | RESPIRATION RATE: 16 BRPM

## 2025-04-15 DIAGNOSIS — J01.00 ACUTE MAXILLARY SINUSITIS, RECURRENCE NOT SPECIFIED: Primary | ICD-10-CM

## 2025-04-15 DIAGNOSIS — J02.9 SORE THROAT: ICD-10-CM

## 2025-04-15 DIAGNOSIS — J06.9 UPPER RESPIRATORY TRACT INFECTION, UNSPECIFIED TYPE: ICD-10-CM

## 2025-04-15 LAB
DEPRECATED S PYO AG THROAT QL EIA: NEGATIVE
FLUAV RNA SPEC QL NAA+PROBE: NEGATIVE
FLUBV RNA RESP QL NAA+PROBE: NEGATIVE
RSV RNA SPEC NAA+PROBE: NEGATIVE
S PYO DNA THROAT QL NAA+PROBE: NOT DETECTED
SARS-COV-2 RNA RESP QL NAA+PROBE: NEGATIVE

## 2025-04-15 PROCEDURE — 99213 OFFICE O/P EST LOW 20 MIN: CPT | Performed by: NURSE PRACTITIONER

## 2025-04-15 PROCEDURE — 3074F SYST BP LT 130 MM HG: CPT | Performed by: NURSE PRACTITIONER

## 2025-04-15 PROCEDURE — 87637 SARSCOV2&INF A&B&RSV AMP PRB: CPT | Performed by: NURSE PRACTITIONER

## 2025-04-15 PROCEDURE — 3078F DIAST BP <80 MM HG: CPT | Performed by: NURSE PRACTITIONER

## 2025-04-15 PROCEDURE — 87651 STREP A DNA AMP PROBE: CPT | Performed by: NURSE PRACTITIONER

## 2025-04-15 PROCEDURE — 1125F AMNT PAIN NOTED PAIN PRSNT: CPT | Performed by: NURSE PRACTITIONER

## 2025-04-15 RX ORDER — AMOXICILLIN 875 MG/1
875 TABLET, COATED ORAL 2 TIMES DAILY
Qty: 20 TABLET | Refills: 0 | Status: SHIPPED | OUTPATIENT
Start: 2025-04-15 | End: 2025-04-25

## 2025-04-15 ASSESSMENT — ENCOUNTER SYMPTOMS
FATIGUE: 1
COUGH: 1
HEADACHES: 1
SORE THROAT: 1

## 2025-04-15 ASSESSMENT — PAIN SCALES - GENERAL: PAINLEVEL_OUTOF10: MODERATE PAIN (5)

## 2025-04-15 NOTE — PROGRESS NOTES
Baltazar Chandra is a 84 year old, presenting for the following health issues:  URI (2 weeks ago cough, congestion, sort throat, sinus congestion)      4/15/2025    11:46 AM   Additional Questions   Roomed by  LPN     URI  This is a new problem. The current episode started in the past 7 days. The problem occurs constantly. The problem has been gradually worsening. Associated symptoms include congestion, coughing, fatigue, headaches and a sore throat. The symptoms are aggravated by coughing. Treatments tried: Robitusin Day, Robitusin Night, Nasal [place], [employer], congestion Pill.   History of Present Illness       Reason for visit:  Sinus congestion and coughing  Symptom onset:  1-2 weeks ago  Symptoms include:  Sinus pain, sore throat, cough, sinus drainage. Left sided  Symptom intensity:  Moderate  Symptom progression:  Worsening  Had these symptoms before:  No  What makes it worse:  No  What makes it better:  Robtussin Day, Robitussin Night, Emergency, [employer] Sinus pills   She is taking medications regularly.        Acute Illness  Acute illness concerns: URI  Onset/Duration: 2+ weeks  Symptoms:  Fever: No  Chills/Sweats: No  Headache (location?): YES  Sinus Pressure: YES  Conjunctivitis:  No  Ear Pain: no  Rhinorrhea: YES  Congestion: YES  Sore Throat: YES  Cough: YES-productive of clear sputum  Wheeze: No  Decreased Appetite: No  Nausea: No  Vomiting: No  Diarrhea: No  Dysuria/Freq.: No  Dysuria or Hematuria: No  Fatigue/Achiness: YES  Sick/Strep Exposure: No  Therapies tried and outcome: cold medcine        Objective    /66 (BP Location: Left arm, Patient Position: Sitting, Cuff Size: Adult Regular)   Pulse 87   Temp 98.3  F (36.8  C) (Oral)   Resp 16   Ht 1.524 m (5')   Wt 51.1 kg (112 lb 9.6 oz)   LMP  (LMP Unknown)   SpO2 96%   BMI 21.99 kg/m    Body mass index is 21.99 kg/m .  Physical Exam   GENERAL: alert and no distress  HENT: normal cephalic/atraumatic, ear canals and TM's normal,  nasal mucosa edematous , rhinorrhea yellow, oropharynx clear, and oral mucous membranes moist, bilateral maxillary sinus tenderness  RESP: lungs clear to auscultation - no rales, rhonchi or wheezes  MS: no gross musculoskeletal defects noted, no edema  PSYCH: mentation appears normal, affect normal/bright    A/P:  1. Acute maxillary sinusitis, recurrence not specified (Primary)  - amoxicillin (AMOXIL) 875 MG tablet; Take 1 tablet (875 mg) by mouth 2 times daily for 10 days.  Dispense: 20 tablet; Refill: 0    2. Upper respiratory tract infection, unspecified type  - Streptococcus A Rapid Screen w/Reflex to PCR - Clinic Collect  - Influenza A/B, RSV and SARS-CoV2 PCR (COVID-19); Future  - Influenza A/B, RSV and SARS-CoV2 PCR (COVID-19) Nasopharyngeal    3. Sore throat  - Streptococcus A Rapid Screen w/Reflex to PCR - Clinic Collect          Signed Electronically by: Brooklyn Alvarado CNP

## 2025-04-16 ENCOUNTER — TELEPHONE (OUTPATIENT)
Dept: FAMILY MEDICINE | Facility: CLINIC | Age: 85
End: 2025-04-16
Payer: COMMERCIAL

## 2025-04-21 ENCOUNTER — OFFICE VISIT (OUTPATIENT)
Dept: FAMILY MEDICINE | Facility: CLINIC | Age: 85
End: 2025-04-21
Payer: COMMERCIAL

## 2025-04-21 VITALS
BODY MASS INDEX: 21.99 KG/M2 | SYSTOLIC BLOOD PRESSURE: 122 MMHG | RESPIRATION RATE: 20 BRPM | OXYGEN SATURATION: 96 % | WEIGHT: 112 LBS | DIASTOLIC BLOOD PRESSURE: 80 MMHG | HEIGHT: 60 IN | HEART RATE: 78 BPM | TEMPERATURE: 98.7 F

## 2025-04-21 DIAGNOSIS — M54.50 CHRONIC BILATERAL LOW BACK PAIN WITHOUT SCIATICA: ICD-10-CM

## 2025-04-21 DIAGNOSIS — R09.81 CONGESTION OF PARANASAL SINUS: Primary | ICD-10-CM

## 2025-04-21 DIAGNOSIS — G89.29 CHRONIC BILATERAL LOW BACK PAIN WITHOUT SCIATICA: ICD-10-CM

## 2025-04-21 PROCEDURE — 3074F SYST BP LT 130 MM HG: CPT | Performed by: FAMILY MEDICINE

## 2025-04-21 PROCEDURE — 3079F DIAST BP 80-89 MM HG: CPT | Performed by: FAMILY MEDICINE

## 2025-04-21 PROCEDURE — 99214 OFFICE O/P EST MOD 30 MIN: CPT | Performed by: FAMILY MEDICINE

## 2025-04-21 PROCEDURE — G2211 COMPLEX E/M VISIT ADD ON: HCPCS | Performed by: FAMILY MEDICINE

## 2025-04-21 ASSESSMENT — ENCOUNTER SYMPTOMS: BACK PAIN: 1

## 2025-04-21 NOTE — PROGRESS NOTES
Assessment & Plan     (R09.81) Congestion of paranasal sinus  (primary encounter diagnosis)  Comment: pt has chronic left sinus congestion and drainage for many years. Pt noticed of cold water, sit up after night sleep, etc triggers the drainage. Clear discharge. No fever, cough, sinus pain. She tried otc sinus medication and not help. Pt state she dose not have seasonal allergy. Exam not remarkable.   Plan: Adult ENT  Referral, CANCELED: Adult         ENT  Referral        Will have consult with ent. Likely will have sinus ct for evaluation and then treatment accordingly.     (M54.50,  G89.29) Chronic bilateral low back pain without sciatica  Comment: pt has low back pain bilateral for several weeks and is getting better. Pt state during the walking she felt the pain, but after walking she got home and sat down the pain went away. She walks 50 minutes 4 times a week. Denies shooting pain, numbness and tingling to legs. No injury and heavy lifting. No fever. She has good bladder and bm control, likely back strain, or ddd.   Plan: advise rest and walk may be 20 - 30 minutes. Heating pads, and otc arthritis cream prn. Consider pt if pain persists.     The longitudinal plan of care for the diagnosis(es)/condition(s) as documented were addressed during this visit. Due to the added complexity in care, I will continue to support Corazon in the subsequent management and with ongoing continuity of care.          Follow-up  No follow-ups on file.    Subjective   Corazon is a 84 year old, presenting for the following health issues:  Sinus Problem (Follow up sinus pressure / pain) and Back Pain (Mid to low back pain while walking )        4/21/2025     2:21 PM   Additional Questions   Roomed by Gen MILLY CMA     Sinus Problem     Back Pain     History of Present Illness       Reason for visit:  Sinus congestion and coughing  Symptom onset:  1-2 weeks ago  Symptoms include:  Sinus pain, sore throat, cough,  sinus drainage. Left sided  Symptom intensity:  Moderate  Symptom progression:  Worsening  Had these symptoms before:  No  What makes it worse:  No  What makes it better:  Robtussin Day, Robitussin Night, Emergency, [employer] Sinus pills   She is taking medications regularly.           Chronic/Recurring Back Pain Follow Up    Where is your back pain located? (Select all that apply) low back bilateral  How would you describe your back pain?  dull ache  Where does your back pain spread? nowhere  Since your last clinic visit for back pain, how has your pain changed? gradually improving  Does your back pain interfere with your job? Not applicable  Since your last visit, have you tried any new treatment? No       Review of Systems  Constitutional, HEENT, cardiovascular, pulmonary, gi and gu systems are negative, except as otherwise noted.      Objective    /80 (BP Location: Left arm, Patient Position: Sitting, Cuff Size: Adult Regular)   Pulse 78   Temp 98.7  F (37.1  C) (Oral)   Resp 20   Ht 1.524 m (5')   Wt 50.8 kg (112 lb)   LMP  (LMP Unknown)   SpO2 96%   BMI 21.87 kg/m    Body mass index is 21.87 kg/m .  Physical Exam   GENERAL: alert and no distress  HENT: ear canals and TM's normal, nose and mouth without ulcers or lesions. Sinus non tenderness  NECK: no adenopathy, no asymmetry, masses, or scars  RESP: lungs clear to auscultation - no rales, rhonchi or wheezes  CV: regular rate and rhythm, normal S1 S2, no S3 or S4, no murmur, click or rub, no peripheral edema  ABDOMEN: soft, nontender, no hepatosplenomegaly, no masses and bowel sounds normal  MS: no gross musculoskeletal defects noted, no edema  Cervical, thoracic and lumbar spine exam is normal without tenderness, masses or kyphoscoliosis. Full range of motion without pain is noted.          Signed Electronically by: Blanca Gallagher MD

## 2025-05-24 ENCOUNTER — HEALTH MAINTENANCE LETTER (OUTPATIENT)
Age: 85
End: 2025-05-24

## 2025-06-14 ENCOUNTER — MEDICAL CORRESPONDENCE (OUTPATIENT)
Dept: HEALTH INFORMATION MANAGEMENT | Facility: CLINIC | Age: 85
End: 2025-06-14
Payer: COMMERCIAL

## 2025-06-17 ENCOUNTER — TELEPHONE (OUTPATIENT)
Dept: FAMILY MEDICINE | Facility: CLINIC | Age: 85
End: 2025-06-17
Payer: COMMERCIAL

## 2025-06-17 NOTE — TELEPHONE ENCOUNTER
Attempted to reach the patient to further discuss, no answer. Left a voicemail for the patient informing her that blood work can be completed during her apt tomorrow, 6/18/25 with her provider. This does not need to be a sperate apt.    Akosua Bass MA on 6/17/2025 at 1:35 PM

## 2025-06-17 NOTE — TELEPHONE ENCOUNTER
Order/Referral Request    Who is requesting: patient    Orders being requested: fasting blood test -she states what she always does     Reason service is needed/diagnosis: she said most of the time she needs them any ways so she would like them before her appt on 6-18. If not she will wait until July    When are orders needed by: prior to her appt    Has this been discussed with Provider: No    Does patient have a preference on a Group/Provider/Facility? [facility]     Does patient have an appointment scheduled?: Yes: 6-18-25    Where to send orders: Place orders within Epic    Could we send this information to you in [facility] or would you prefer to receive a phone call?:   Patient would prefer a phone call   Okay to leave a detailed message?: Yes at Home number on file [phone] (home)

## 2025-06-18 ENCOUNTER — OFFICE VISIT (OUTPATIENT)
Dept: FAMILY MEDICINE | Facility: CLINIC | Age: 85
End: 2025-06-18
Payer: COMMERCIAL

## 2025-06-18 ENCOUNTER — TELEPHONE (OUTPATIENT)
Dept: FAMILY MEDICINE | Facility: CLINIC | Age: 85
End: 2025-06-18

## 2025-06-18 VITALS
RESPIRATION RATE: 20 BRPM | BODY MASS INDEX: 21.2 KG/M2 | OXYGEN SATURATION: 95 % | HEIGHT: 60 IN | SYSTOLIC BLOOD PRESSURE: 136 MMHG | HEART RATE: 67 BPM | DIASTOLIC BLOOD PRESSURE: 80 MMHG | WEIGHT: 108 LBS | TEMPERATURE: 99 F

## 2025-06-18 DIAGNOSIS — R09.81 NASAL CONGESTION: Primary | ICD-10-CM

## 2025-06-18 DIAGNOSIS — I10 PRIMARY HYPERTENSION: ICD-10-CM

## 2025-06-18 PROCEDURE — G2211 COMPLEX E/M VISIT ADD ON: HCPCS | Performed by: FAMILY MEDICINE

## 2025-06-18 PROCEDURE — 99214 OFFICE O/P EST MOD 30 MIN: CPT | Performed by: FAMILY MEDICINE

## 2025-06-18 PROCEDURE — 3075F SYST BP GE 130 - 139MM HG: CPT | Performed by: FAMILY MEDICINE

## 2025-06-18 PROCEDURE — 3079F DIAST BP 80-89 MM HG: CPT | Performed by: FAMILY MEDICINE

## 2025-06-18 NOTE — TELEPHONE ENCOUNTER
Patient Quality Outreach    Patient is due for the following:   Physical Annual Wellness Visit    Action(s) Taken:   Patient declined follow up at this time.    Type of outreach:    In person at pt med check appt today    Questions for provider review:    None         Trini Mckay CMA  Chart routed to None.

## 2025-06-18 NOTE — PROGRESS NOTES
Assessment & Plan     (R09.81) Nasal congestion  (primary encounter diagnosis)  Comment: pt has nose congestion for several month, with mucus discharge and post nasal drainage. Sometimes she feels sinus pressure. No fever, headache, cough, sob, wheezing. She had consult with ent and sinus ct. I have not seen the report yet, but per pt the ent provider told her that ct was normal. Exam is not remarkable. Non smoker, no environment change. She tried steamer and not much help, Likely seasonal allergy.   Plan: advise otc nose spray flonase and zyrtec prn.     (I10) Primary hypertension  Comment: bp reasonable controlled. She takes medication as instructed and no side effect.   Plan: continue current medication and home bp monitor. If bp is < 100/60 she will hold amlodipine.       The longitudinal plan of care for the diagnosis(es)/condition(s) as documented were addressed during this visit. Due to the added complexity in care, I will continue to support Corazon in the subsequent management and with ongoing continuity of care.            Subjective   Corazon is a 84 year old, presenting for the following health issues:  Sinus Problem (Follow up from sinus CT and ongoing drainage and sinus pressure/ swelling, effects her voice and makes it so her voice is raspy. ENT suggested nothing else after the CT scan came back normal)        6/18/2025    10:23 AM   Additional Questions   Roomed by Gen MILLY CMA     Sinus Problem     History of Present Illness       Reason for visit:  Follow up sinus issue   She is taking medications regularly.         Hypertension Follow-up    Do you check your blood pressure regularly outside of the clinic? No   Are you following a low salt diet? Yes  Are your blood pressures ever more than 140 on the top number (systolic) OR more   than 90 on the bottom number (diastolic), for example 140/90? N/A    BP Readings from Last 2 Encounters:   06/18/25 136/80   04/21/25 122/80          Review of  Systems  Constitutional, HEENT, cardiovascular, pulmonary, gi and gu systems are negative, except as otherwise noted.      Objective    /80 (BP Location: Right arm, Patient Position: Sitting, Cuff Size: Adult Regular)   Pulse 67   Temp 99  F (37.2  C) (Oral)   Resp 20   Ht 1.524 m (5')   Wt 49 kg (108 lb)   LMP  (LMP Unknown)   SpO2 95%   BMI 21.09 kg/m    Body mass index is 21.09 kg/m .  Physical Exam   GENERAL: alert and no distress  HENT: ear canals and TM's normal, nose and mouth without ulcers or lesions  NECK: no adenopathy, no asymmetry, masses, or scars  RESP: lungs clear to auscultation - no rales, rhonchi or wheezes  CV: regular rate and rhythm, normal S1 S2, no S3 or S4, no murmur, click or rub, no peripheral edema  ABDOMEN: soft, nontender, no hepatosplenomegaly, no masses and bowel sounds normal  MS: no gross musculoskeletal defects noted, no edema       Signed Electronically by: Blanca Gallagher MD

## 2025-07-18 ENCOUNTER — LAB (OUTPATIENT)
Dept: LAB | Facility: CLINIC | Age: 85
End: 2025-07-18
Payer: COMMERCIAL

## 2025-07-18 DIAGNOSIS — N28.0 RENAL INFARCTION: ICD-10-CM

## 2025-07-18 LAB
ALBUMIN SERPL BCG-MCNC: 4.1 G/DL (ref 3.5–5.2)
ANION GAP SERPL CALCULATED.3IONS-SCNC: 10 MMOL/L (ref 7–15)
BUN SERPL-MCNC: 8 MG/DL (ref 8–23)
CALCIUM SERPL-MCNC: 9.4 MG/DL (ref 8.8–10.4)
CHLORIDE SERPL-SCNC: 99 MMOL/L (ref 98–107)
CHOLEST SERPL-MCNC: 259 MG/DL
CREAT SERPL-MCNC: 0.76 MG/DL (ref 0.51–0.95)
EGFRCR SERPLBLD CKD-EPI 2021: 77 ML/MIN/1.73M2
FASTING STATUS PATIENT QL REPORTED: YES
GLUCOSE SERPL-MCNC: 111 MG/DL (ref 70–99)
HCO3 SERPL-SCNC: 28 MMOL/L (ref 22–29)
HDLC SERPL-MCNC: 146 MG/DL
LDLC SERPL CALC-MCNC: 102 MG/DL
NONHDLC SERPL-MCNC: 113 MG/DL
PHOSPHATE SERPL-MCNC: 3.6 MG/DL (ref 2.5–4.5)
POTASSIUM SERPL-SCNC: 3.7 MMOL/L (ref 3.4–5.3)
SODIUM SERPL-SCNC: 137 MMOL/L (ref 135–145)
TRIGL SERPL-MCNC: 53 MG/DL

## 2025-07-18 PROCEDURE — 80061 LIPID PANEL: CPT | Mod: GZ

## 2025-07-18 PROCEDURE — 80069 RENAL FUNCTION PANEL: CPT

## 2025-07-18 PROCEDURE — 36415 COLL VENOUS BLD VENIPUNCTURE: CPT

## 2025-07-18 PROCEDURE — 3049F LDL-C 100-129 MG/DL: CPT

## 2025-07-21 ENCOUNTER — MEDICAL CORRESPONDENCE (OUTPATIENT)
Dept: HEALTH INFORMATION MANAGEMENT | Facility: CLINIC | Age: 85
End: 2025-07-21
Payer: COMMERCIAL

## 2025-07-21 DIAGNOSIS — N28.0 THROMBOEMBOLISM OF RENAL ARTERIES: Primary | ICD-10-CM

## 2025-07-23 ENCOUNTER — LAB (OUTPATIENT)
Dept: LAB | Facility: CLINIC | Age: 85
End: 2025-07-23
Payer: COMMERCIAL

## 2025-07-23 DIAGNOSIS — N28.0 THROMBOEMBOLISM OF RENAL ARTERIES: ICD-10-CM

## 2025-07-23 LAB
ALBUMIN MFR UR ELPH: 12.9 MG/DL
ALBUMIN UR-MCNC: NEGATIVE MG/DL
APPEARANCE UR: CLEAR
BACTERIA #/AREA URNS HPF: ABNORMAL /HPF
BILIRUB UR QL STRIP: NEGATIVE
COLOR UR AUTO: YELLOW
CREAT UR-MCNC: 42.2 MG/DL
ERYTHROCYTE [DISTWIDTH] IN BLOOD BY AUTOMATED COUNT: 13.2 % (ref 10–15)
GLUCOSE UR STRIP-MCNC: NEGATIVE MG/DL
HCT VFR BLD AUTO: 44.9 % (ref 35–47)
HGB BLD-MCNC: 14.6 G/DL (ref 11.7–15.7)
HGB UR QL STRIP: NEGATIVE
KETONES UR STRIP-MCNC: NEGATIVE MG/DL
LEUKOCYTE ESTERASE UR QL STRIP: ABNORMAL
MCH RBC QN AUTO: 30.5 PG (ref 26.5–33)
MCHC RBC AUTO-ENTMCNC: 32.5 G/DL (ref 31.5–36.5)
MCV RBC AUTO: 94 FL (ref 78–100)
NITRATE UR QL: NEGATIVE
PH UR STRIP: 6 [PH] (ref 5–8)
PLATELET # BLD AUTO: 184 10E3/UL (ref 150–450)
PROT/CREAT 24H UR: 0.31 MG/MG CR (ref 0–0.2)
RBC # BLD AUTO: 4.79 10E6/UL (ref 3.8–5.2)
RBC #/AREA URNS AUTO: ABNORMAL /HPF
SP GR UR STRIP: 1.01 (ref 1–1.03)
SQUAMOUS #/AREA URNS AUTO: ABNORMAL /LPF
UROBILINOGEN UR STRIP-ACNC: 0.2 E.U./DL
WBC # BLD AUTO: 6.3 10E3/UL (ref 4–11)
WBC #/AREA URNS AUTO: ABNORMAL /HPF

## 2025-07-23 PROCEDURE — 85027 COMPLETE CBC AUTOMATED: CPT

## 2025-07-23 PROCEDURE — 36415 COLL VENOUS BLD VENIPUNCTURE: CPT

## 2025-07-23 PROCEDURE — 81001 URINALYSIS AUTO W/SCOPE: CPT

## 2025-07-23 PROCEDURE — 84156 ASSAY OF PROTEIN URINE: CPT

## 2025-07-24 ENCOUNTER — ALLIED HEALTH/NURSE VISIT (OUTPATIENT)
Dept: FAMILY MEDICINE | Facility: CLINIC | Age: 85
End: 2025-07-24
Payer: COMMERCIAL

## 2025-07-24 DIAGNOSIS — Z76.0 MEDICATION REFILL: Primary | ICD-10-CM

## 2025-07-24 DIAGNOSIS — M54.16 LUMBAR RADICULITIS: ICD-10-CM

## 2025-07-24 RX ORDER — TIZANIDINE 2 MG/1
2 TABLET ORAL 3 TIMES DAILY PRN
Qty: 90 TABLET | Refills: 1 | Status: SHIPPED | OUTPATIENT
Start: 2025-07-24

## 2025-07-24 NOTE — TELEPHONE ENCOUNTER
The patient stopped in clinic requesting refills be added to her tizanidine as she is out. The pharmacy instructed her to see her PCP about this and the patient thought she had to physically come into the clinic. The patient no longer sees the provider who prescribed this medication in the past and she is wondering if her PCP can take over prescribing it. [facility] for muscle spasms in her back. Routing to her PCP for review.    Lauren Duggan RN  [facility]

## 2025-07-24 NOTE — TELEPHONE ENCOUNTER
Spoke with the patient to relay the provider's message. She verbalized understanding.    Lauren Duggan RN  [facility]

## 2025-07-24 NOTE — TELEPHONE ENCOUNTER
Please inform pt that I ordered it to her pharmacy.     Blanca Gallagher MD on 7/24/2025 at 1:50 PM;

## 2025-07-24 NOTE — PROGRESS NOTES
The patient presented in clinic for a medication question regarding future refills. Please see the 7/24 refill encounter which was sent to the patient's PCP. The patient was informed that the clinic will reach out when an update is obtained from her PCP.    Lauren Duggan RN  [facility]

## 2025-08-11 ENCOUNTER — PATIENT OUTREACH (OUTPATIENT)
Dept: FAMILY MEDICINE | Facility: CLINIC | Age: 85
End: 2025-08-11
Payer: COMMERCIAL